# Patient Record
Sex: FEMALE | Race: WHITE | Employment: OTHER | URBAN - METROPOLITAN AREA
[De-identification: names, ages, dates, MRNs, and addresses within clinical notes are randomized per-mention and may not be internally consistent; named-entity substitution may affect disease eponyms.]

---

## 2017-01-26 RX ORDER — GABAPENTIN 300 MG/1
CAPSULE ORAL
Qty: 240 CAP | Refills: 3 | Status: ON HOLD | OUTPATIENT
Start: 2017-01-26 | End: 2018-03-03

## 2018-03-02 ENCOUNTER — APPOINTMENT (OUTPATIENT)
Dept: GENERAL RADIOLOGY | Age: 63
DRG: 218 | End: 2018-03-02
Attending: ORTHOPAEDIC SURGERY
Payer: COMMERCIAL

## 2018-03-02 ENCOUNTER — ANESTHESIA EVENT (OUTPATIENT)
Dept: SURGERY | Age: 63
DRG: 218 | End: 2018-03-02
Payer: COMMERCIAL

## 2018-03-02 ENCOUNTER — APPOINTMENT (OUTPATIENT)
Dept: GENERAL RADIOLOGY | Age: 63
DRG: 218 | End: 2018-03-02
Attending: EMERGENCY MEDICINE
Payer: COMMERCIAL

## 2018-03-02 ENCOUNTER — ANESTHESIA (OUTPATIENT)
Dept: SURGERY | Age: 63
DRG: 218 | End: 2018-03-02
Payer: COMMERCIAL

## 2018-03-02 ENCOUNTER — HOSPITAL ENCOUNTER (INPATIENT)
Age: 63
LOS: 12 days | Discharge: HOME | DRG: 218 | End: 2018-03-14
Attending: EMERGENCY MEDICINE | Admitting: FAMILY MEDICINE
Payer: COMMERCIAL

## 2018-03-02 DIAGNOSIS — S82.891C: Primary | ICD-10-CM

## 2018-03-02 PROBLEM — S82.891A ANKLE FRACTURE, RIGHT: Status: ACTIVE | Noted: 2018-03-02

## 2018-03-02 LAB
ABO + RH BLD: NORMAL
ALBUMIN SERPL-MCNC: 4.1 G/DL (ref 3.5–5)
ALBUMIN/GLOB SERPL: 1.1 {RATIO} (ref 1.1–2.2)
ALP SERPL-CCNC: 81 U/L (ref 45–117)
ALT SERPL-CCNC: 27 U/L (ref 12–78)
ANION GAP SERPL CALC-SCNC: 15 MMOL/L (ref 5–15)
APPEARANCE UR: ABNORMAL
APTT PPP: 23.6 SEC (ref 22.1–32)
AST SERPL-CCNC: 35 U/L (ref 15–37)
BACTERIA URNS QL MICRO: NEGATIVE /HPF
BASOPHILS # BLD: 0.1 K/UL (ref 0–0.1)
BASOPHILS NFR BLD: 1 % (ref 0–1)
BILIRUB SERPL-MCNC: 0.5 MG/DL (ref 0.2–1)
BILIRUB UR QL: NEGATIVE
BLOOD GROUP ANTIBODIES SERPL: NORMAL
BUN SERPL-MCNC: 42 MG/DL (ref 6–20)
BUN/CREAT SERPL: 13 (ref 12–20)
CALCIUM SERPL-MCNC: 9.1 MG/DL (ref 8.5–10.1)
CHLORIDE SERPL-SCNC: 93 MMOL/L (ref 97–108)
CO2 SERPL-SCNC: 26 MMOL/L (ref 21–32)
COLOR UR: ABNORMAL
CREAT SERPL-MCNC: 3.16 MG/DL (ref 0.55–1.02)
DIFFERENTIAL METHOD BLD: NORMAL
EOSINOPHIL # BLD: 0.1 K/UL (ref 0–0.4)
EOSINOPHIL NFR BLD: 1 % (ref 0–7)
EPITH CASTS URNS QL MICRO: ABNORMAL /LPF
ERYTHROCYTE [DISTWIDTH] IN BLOOD BY AUTOMATED COUNT: 13.5 % (ref 11.5–14.5)
GLOBULIN SER CALC-MCNC: 3.9 G/DL (ref 2–4)
GLUCOSE BLD STRIP.AUTO-MCNC: 130 MG/DL (ref 65–100)
GLUCOSE SERPL-MCNC: 119 MG/DL (ref 65–100)
GLUCOSE UR STRIP.AUTO-MCNC: NEGATIVE MG/DL
HCT VFR BLD AUTO: 36.4 % (ref 35–47)
HGB BLD-MCNC: 12.5 G/DL (ref 11.5–16)
HGB UR QL STRIP: NEGATIVE
IMM GRANULOCYTES # BLD: 0 K/UL (ref 0–0.04)
IMM GRANULOCYTES NFR BLD AUTO: 0 % (ref 0–0.5)
INR PPP: 1.1 (ref 0.9–1.1)
KETONES UR QL STRIP.AUTO: NEGATIVE MG/DL
LEUKOCYTE ESTERASE UR QL STRIP.AUTO: ABNORMAL
LYMPHOCYTES # BLD: 2.7 K/UL (ref 0.8–3.5)
LYMPHOCYTES NFR BLD: 25 % (ref 12–49)
MCH RBC QN AUTO: 31.7 PG (ref 26–34)
MCHC RBC AUTO-ENTMCNC: 34.3 G/DL (ref 30–36.5)
MCV RBC AUTO: 92.4 FL (ref 80–99)
MONOCYTES # BLD: 0.9 K/UL (ref 0–1)
MONOCYTES NFR BLD: 9 % (ref 5–13)
NEUTS SEG # BLD: 6.9 K/UL (ref 1.8–8)
NEUTS SEG NFR BLD: 65 % (ref 32–75)
NITRITE UR QL STRIP.AUTO: NEGATIVE
NRBC # BLD: 0 K/UL (ref 0–0.01)
NRBC BLD-RTO: 0 PER 100 WBC
PH UR STRIP: 6 [PH] (ref 5–8)
PLATELET # BLD AUTO: 155 K/UL (ref 150–400)
PMV BLD AUTO: 10.4 FL (ref 8.9–12.9)
POTASSIUM SERPL-SCNC: 3.1 MMOL/L (ref 3.5–5.1)
PROT SERPL-MCNC: 8 G/DL (ref 6.4–8.2)
PROT UR STRIP-MCNC: 30 MG/DL
PROTHROMBIN TIME: 10.9 SEC (ref 9–11.1)
RBC # BLD AUTO: 3.94 M/UL (ref 3.8–5.2)
RBC #/AREA URNS HPF: ABNORMAL /HPF (ref 0–5)
SERVICE CMNT-IMP: ABNORMAL
SODIUM SERPL-SCNC: 134 MMOL/L (ref 136–145)
SP GR UR REFRACTOMETRY: 1.02 (ref 1–1.03)
SPECIMEN EXP DATE BLD: NORMAL
THERAPEUTIC RANGE,PTTT: NORMAL SECS (ref 58–77)
UROBILINOGEN UR QL STRIP.AUTO: 0.2 EU/DL (ref 0.2–1)
WBC # BLD AUTO: 10.7 K/UL (ref 3.6–11)
WBC URNS QL MICRO: ABNORMAL /HPF (ref 0–4)

## 2018-03-02 PROCEDURE — 74011250636 HC RX REV CODE- 250/636

## 2018-03-02 PROCEDURE — 76060000033 HC ANESTHESIA 1 TO 1.5 HR: Performed by: ORTHOPAEDIC SURGERY

## 2018-03-02 PROCEDURE — 96365 THER/PROPH/DIAG IV INF INIT: CPT

## 2018-03-02 PROCEDURE — 77030031139 HC SUT VCRL2 J&J -A: Performed by: ORTHOPAEDIC SURGERY

## 2018-03-02 PROCEDURE — 77030034777 HC ROD EXT FIX CONN ANKL STRY -E: Performed by: ORTHOPAEDIC SURGERY

## 2018-03-02 PROCEDURE — 74011250637 HC RX REV CODE- 250/637: Performed by: ANESTHESIOLOGY

## 2018-03-02 PROCEDURE — 77030003747: Performed by: ORTHOPAEDIC SURGERY

## 2018-03-02 PROCEDURE — 0JDQ0ZZ EXTRACTION OF RIGHT FOOT SUBCUTANEOUS TISSUE AND FASCIA, OPEN APPROACH: ICD-10-PCS | Performed by: ORTHOPAEDIC SURGERY

## 2018-03-02 PROCEDURE — 36415 COLL VENOUS BLD VENIPUNCTURE: CPT | Performed by: EMERGENCY MEDICINE

## 2018-03-02 PROCEDURE — 77030034775 HC CLMP PIN EXT FIX ANG POST STRY -G: Performed by: ORTHOPAEDIC SURGERY

## 2018-03-02 PROCEDURE — 77030002916 HC SUT ETHLN J&J -A: Performed by: ORTHOPAEDIC SURGERY

## 2018-03-02 PROCEDURE — 73590 X-RAY EXAM OF LOWER LEG: CPT

## 2018-03-02 PROCEDURE — 74011000250 HC RX REV CODE- 250: Performed by: ORTHOPAEDIC SURGERY

## 2018-03-02 PROCEDURE — 77030019908 HC STETH ESOPH SIMS -A: Performed by: ANESTHESIOLOGY

## 2018-03-02 PROCEDURE — 77030028224 HC PDNG CST BSNM -A: Performed by: ORTHOPAEDIC SURGERY

## 2018-03-02 PROCEDURE — 82962 GLUCOSE BLOOD TEST: CPT

## 2018-03-02 PROCEDURE — 76010000161 HC OR TIME 1 TO 1.5 HR INTENSV-TIER 1: Performed by: ORTHOPAEDIC SURGERY

## 2018-03-02 PROCEDURE — 86900 BLOOD TYPING SEROLOGIC ABO: CPT | Performed by: EMERGENCY MEDICINE

## 2018-03-02 PROCEDURE — 77030014486 HC PIN EXT FIX STRY -C: Performed by: ORTHOPAEDIC SURGERY

## 2018-03-02 PROCEDURE — 85025 COMPLETE CBC W/AUTO DIFF WBC: CPT | Performed by: EMERGENCY MEDICINE

## 2018-03-02 PROCEDURE — 74011000272 HC RX REV CODE- 272: Performed by: ORTHOPAEDIC SURGERY

## 2018-03-02 PROCEDURE — 96375 TX/PRO/DX INJ NEW DRUG ADDON: CPT

## 2018-03-02 PROCEDURE — 99284 EMERGENCY DEPT VISIT MOD MDM: CPT

## 2018-03-02 PROCEDURE — 74011250636 HC RX REV CODE- 250/636: Performed by: ANESTHESIOLOGY

## 2018-03-02 PROCEDURE — 93005 ELECTROCARDIOGRAM TRACING: CPT

## 2018-03-02 PROCEDURE — 76210000006 HC OR PH I REC 0.5 TO 1 HR: Performed by: ORTHOPAEDIC SURGERY

## 2018-03-02 PROCEDURE — 0QSG35Z REPOSITION RIGHT TIBIA WITH EXTERNAL FIXATION DEVICE, PERCUTANEOUS APPROACH: ICD-10-PCS | Performed by: ORTHOPAEDIC SURGERY

## 2018-03-02 PROCEDURE — 74011250636 HC RX REV CODE- 250/636: Performed by: EMERGENCY MEDICINE

## 2018-03-02 PROCEDURE — 85730 THROMBOPLASTIN TIME PARTIAL: CPT | Performed by: EMERGENCY MEDICINE

## 2018-03-02 PROCEDURE — 77030026438 HC STYL ET INTUB CARD -A: Performed by: ANESTHESIOLOGY

## 2018-03-02 PROCEDURE — 77030035456: Performed by: ORTHOPAEDIC SURGERY

## 2018-03-02 PROCEDURE — 81001 URINALYSIS AUTO W/SCOPE: CPT | Performed by: EMERGENCY MEDICINE

## 2018-03-02 PROCEDURE — 77030035497 HC PIN EXT FIX TRNFX APEX STRY -C: Performed by: ORTHOPAEDIC SURGERY

## 2018-03-02 PROCEDURE — 77030034776 HC ROD EXT FIX SEMI CIRC MRI STRY -G: Performed by: ORTHOPAEDIC SURGERY

## 2018-03-02 PROCEDURE — 77030000032 HC CUF TRNQT ZIMM -B: Performed by: ORTHOPAEDIC SURGERY

## 2018-03-02 PROCEDURE — 77030008684 HC TU ET CUF COVD -B: Performed by: ANESTHESIOLOGY

## 2018-03-02 PROCEDURE — 73610 X-RAY EXAM OF ANKLE: CPT

## 2018-03-02 PROCEDURE — 0QSJ35Z REPOSITION RIGHT FIBULA WITH EXTERNAL FIXATION DEVICE, PERCUTANEOUS APPROACH: ICD-10-PCS | Performed by: ORTHOPAEDIC SURGERY

## 2018-03-02 PROCEDURE — 80053 COMPREHEN METABOLIC PANEL: CPT | Performed by: EMERGENCY MEDICINE

## 2018-03-02 PROCEDURE — 77030020833 HC CAP PROTCT PIN ASPN -A: Performed by: ORTHOPAEDIC SURGERY

## 2018-03-02 PROCEDURE — 74011000258 HC RX REV CODE- 258: Performed by: EMERGENCY MEDICINE

## 2018-03-02 PROCEDURE — 74011000250 HC RX REV CODE- 250

## 2018-03-02 PROCEDURE — 71045 X-RAY EXAM CHEST 1 VIEW: CPT

## 2018-03-02 PROCEDURE — 85610 PROTHROMBIN TIME: CPT | Performed by: EMERGENCY MEDICINE

## 2018-03-02 PROCEDURE — 77030011640 HC PAD GRND REM COVD -A: Performed by: ORTHOPAEDIC SURGERY

## 2018-03-02 PROCEDURE — 65270000029 HC RM PRIVATE

## 2018-03-02 PROCEDURE — 87086 URINE CULTURE/COLONY COUNT: CPT | Performed by: EMERGENCY MEDICINE

## 2018-03-02 PROCEDURE — 76001 XR FLUOROSCOPY OVER 60 MINUTES: CPT

## 2018-03-02 DEVICE — TRANSFIXING PIN
Type: IMPLANTABLE DEVICE | Site: ANKLE | Status: FUNCTIONAL
Brand: APEX

## 2018-03-02 DEVICE — SEMI-CIRCULAR CURVED ROD
Type: IMPLANTABLE DEVICE | Site: ANKLE | Status: FUNCTIONAL
Brand: HOFFMANN

## 2018-03-02 DEVICE — CONNECTING ROD
Type: IMPLANTABLE DEVICE | Site: ANKLE | Status: FUNCTIONAL
Brand: HOFFMANN

## 2018-03-02 RX ORDER — LETROZOLE 2.5 MG/1
2.5 TABLET, FILM COATED ORAL DAILY
COMMUNITY

## 2018-03-02 RX ORDER — MORPHINE SULFATE 4 MG/ML
4 INJECTION INTRAVENOUS ONCE
Status: COMPLETED | OUTPATIENT
Start: 2018-03-02 | End: 2018-03-02

## 2018-03-02 RX ORDER — SODIUM CHLORIDE 0.9 % (FLUSH) 0.9 %
5-10 SYRINGE (ML) INJECTION AS NEEDED
Status: DISCONTINUED | OUTPATIENT
Start: 2018-03-02 | End: 2018-03-02 | Stop reason: HOSPADM

## 2018-03-02 RX ORDER — SODIUM CHLORIDE 0.9 % (FLUSH) 0.9 %
5-10 SYRINGE (ML) INJECTION EVERY 8 HOURS
Status: DISCONTINUED | OUTPATIENT
Start: 2018-03-02 | End: 2018-03-02

## 2018-03-02 RX ORDER — HYDROCHLOROTHIAZIDE 25 MG/1
25 TABLET ORAL DAILY
Status: DISCONTINUED | OUTPATIENT
Start: 2018-03-03 | End: 2018-03-03

## 2018-03-02 RX ORDER — PROPOFOL 10 MG/ML
INJECTION, EMULSION INTRAVENOUS AS NEEDED
Status: DISCONTINUED | OUTPATIENT
Start: 2018-03-02 | End: 2018-03-02 | Stop reason: HOSPADM

## 2018-03-02 RX ORDER — FOLIC ACID 1 MG/1
1 TABLET ORAL DAILY
Status: DISCONTINUED | OUTPATIENT
Start: 2018-03-03 | End: 2018-03-14 | Stop reason: HOSPADM

## 2018-03-02 RX ORDER — LEVOTHYROXINE SODIUM 50 UG/1
50 TABLET ORAL
Status: DISCONTINUED | OUTPATIENT
Start: 2018-03-03 | End: 2018-03-14 | Stop reason: HOSPADM

## 2018-03-02 RX ORDER — CARVEDILOL 12.5 MG/1
12.5 TABLET ORAL 2 TIMES DAILY WITH MEALS
Status: DISCONTINUED | OUTPATIENT
Start: 2018-03-03 | End: 2018-03-14 | Stop reason: HOSPADM

## 2018-03-02 RX ORDER — OXYCODONE AND ACETAMINOPHEN 7.5; 325 MG/1; MG/1
1 TABLET ORAL
Status: DISCONTINUED | OUTPATIENT
Start: 2018-03-02 | End: 2018-03-12

## 2018-03-02 RX ORDER — HYDROMORPHONE HYDROCHLORIDE 1 MG/ML
.25-1 INJECTION, SOLUTION INTRAMUSCULAR; INTRAVENOUS; SUBCUTANEOUS
Status: DISCONTINUED | OUTPATIENT
Start: 2018-03-02 | End: 2018-03-02 | Stop reason: HOSPADM

## 2018-03-02 RX ORDER — LOSARTAN POTASSIUM 50 MG/1
50 TABLET ORAL DAILY
Status: DISCONTINUED | OUTPATIENT
Start: 2018-03-03 | End: 2018-03-03

## 2018-03-02 RX ORDER — LIDOCAINE HYDROCHLORIDE 20 MG/ML
INJECTION, SOLUTION EPIDURAL; INFILTRATION; INTRACAUDAL; PERINEURAL AS NEEDED
Status: DISCONTINUED | OUTPATIENT
Start: 2018-03-02 | End: 2018-03-02 | Stop reason: HOSPADM

## 2018-03-02 RX ORDER — PHENYLEPHRINE HCL IN 0.9% NACL 0.4MG/10ML
SYRINGE (ML) INTRAVENOUS
Status: DISCONTINUED | OUTPATIENT
Start: 2018-03-02 | End: 2018-03-02 | Stop reason: HOSPADM

## 2018-03-02 RX ORDER — PROCHLORPERAZINE EDISYLATE 5 MG/ML
5 INJECTION INTRAMUSCULAR; INTRAVENOUS
Status: DISCONTINUED | OUTPATIENT
Start: 2018-03-02 | End: 2018-03-14 | Stop reason: HOSPADM

## 2018-03-02 RX ORDER — CARVEDILOL 12.5 MG/1
12.5 TABLET ORAL 2 TIMES DAILY WITH MEALS
COMMUNITY

## 2018-03-02 RX ORDER — SODIUM CHLORIDE 0.9 % (FLUSH) 0.9 %
5-10 SYRINGE (ML) INJECTION EVERY 8 HOURS
Status: DISCONTINUED | OUTPATIENT
Start: 2018-03-02 | End: 2018-03-02 | Stop reason: HOSPADM

## 2018-03-02 RX ORDER — SODIUM CHLORIDE, SODIUM LACTATE, POTASSIUM CHLORIDE, CALCIUM CHLORIDE 600; 310; 30; 20 MG/100ML; MG/100ML; MG/100ML; MG/100ML
100 INJECTION, SOLUTION INTRAVENOUS CONTINUOUS
Status: DISCONTINUED | OUTPATIENT
Start: 2018-03-02 | End: 2018-03-02 | Stop reason: HOSPADM

## 2018-03-02 RX ORDER — AMLODIPINE BESYLATE 10 MG/1
10 TABLET ORAL DAILY
Status: ON HOLD | COMMUNITY
End: 2018-03-02

## 2018-03-02 RX ORDER — SODIUM CHLORIDE 0.9 % (FLUSH) 0.9 %
5-10 SYRINGE (ML) INJECTION EVERY 8 HOURS
Status: DISCONTINUED | OUTPATIENT
Start: 2018-03-03 | End: 2018-03-14 | Stop reason: HOSPADM

## 2018-03-02 RX ORDER — HEPARIN SODIUM 5000 [USP'U]/ML
5000 INJECTION, SOLUTION INTRAVENOUS; SUBCUTANEOUS EVERY 8 HOURS
Status: DISPENSED | OUTPATIENT
Start: 2018-03-03 | End: 2018-03-11

## 2018-03-02 RX ORDER — ACETAMINOPHEN 325 MG/1
650 TABLET ORAL
Status: DISCONTINUED | OUTPATIENT
Start: 2018-03-02 | End: 2018-03-14 | Stop reason: HOSPADM

## 2018-03-02 RX ORDER — SODIUM CHLORIDE 0.9 % (FLUSH) 0.9 %
5-10 SYRINGE (ML) INJECTION AS NEEDED
Status: DISCONTINUED | OUTPATIENT
Start: 2018-03-02 | End: 2018-03-02

## 2018-03-02 RX ORDER — SODIUM CHLORIDE 0.9 % (FLUSH) 0.9 %
5-10 SYRINGE (ML) INJECTION AS NEEDED
Status: DISCONTINUED | OUTPATIENT
Start: 2018-03-02 | End: 2018-03-14 | Stop reason: HOSPADM

## 2018-03-02 RX ORDER — SCOLOPAMINE TRANSDERMAL SYSTEM 1 MG/1
1 PATCH, EXTENDED RELEASE TRANSDERMAL
Status: COMPLETED | OUTPATIENT
Start: 2018-03-02 | End: 2018-03-05

## 2018-03-02 RX ORDER — SUCCINYLCHOLINE CHLORIDE 20 MG/ML
INJECTION INTRAMUSCULAR; INTRAVENOUS AS NEEDED
Status: DISCONTINUED | OUTPATIENT
Start: 2018-03-02 | End: 2018-03-02 | Stop reason: HOSPADM

## 2018-03-02 RX ORDER — FENTANYL CITRATE 50 UG/ML
INJECTION, SOLUTION INTRAMUSCULAR; INTRAVENOUS AS NEEDED
Status: DISCONTINUED | OUTPATIENT
Start: 2018-03-02 | End: 2018-03-02 | Stop reason: HOSPADM

## 2018-03-02 RX ORDER — CEFAZOLIN SODIUM/WATER 2 G/20 ML
2 SYRINGE (ML) INTRAVENOUS EVERY 8 HOURS
Status: COMPLETED | OUTPATIENT
Start: 2018-03-03 | End: 2018-03-05

## 2018-03-02 RX ORDER — FUROSEMIDE 20 MG/1
10 TABLET ORAL DAILY
Status: ON HOLD | COMMUNITY
End: 2018-03-02

## 2018-03-02 RX ORDER — LORAZEPAM 2 MG/ML
1 INJECTION INTRAMUSCULAR
Status: DISCONTINUED | OUTPATIENT
Start: 2018-03-02 | End: 2018-03-14 | Stop reason: HOSPADM

## 2018-03-02 RX ORDER — ONDANSETRON 2 MG/ML
INJECTION INTRAMUSCULAR; INTRAVENOUS AS NEEDED
Status: DISCONTINUED | OUTPATIENT
Start: 2018-03-02 | End: 2018-03-02 | Stop reason: HOSPADM

## 2018-03-02 RX ORDER — NALOXONE HYDROCHLORIDE 0.4 MG/ML
0.4 INJECTION, SOLUTION INTRAMUSCULAR; INTRAVENOUS; SUBCUTANEOUS AS NEEDED
Status: DISCONTINUED | OUTPATIENT
Start: 2018-03-02 | End: 2018-03-14 | Stop reason: HOSPADM

## 2018-03-02 RX ORDER — HYDROMORPHONE HYDROCHLORIDE 1 MG/ML
1 INJECTION, SOLUTION INTRAMUSCULAR; INTRAVENOUS; SUBCUTANEOUS
Status: DISCONTINUED | OUTPATIENT
Start: 2018-03-02 | End: 2018-03-12

## 2018-03-02 RX ORDER — ASPIRIN 325 MG/1
100 TABLET, FILM COATED ORAL DAILY
Status: DISCONTINUED | OUTPATIENT
Start: 2018-03-03 | End: 2018-03-14 | Stop reason: HOSPADM

## 2018-03-02 RX ORDER — LANOLIN ALCOHOL/MO/W.PET/CERES
100 CREAM (GRAM) TOPICAL DAILY
Status: ON HOLD | COMMUNITY
End: 2018-03-02

## 2018-03-02 RX ORDER — CEFAZOLIN SODIUM 1 G/3ML
INJECTION, POWDER, FOR SOLUTION INTRAMUSCULAR; INTRAVENOUS AS NEEDED
Status: DISCONTINUED | OUTPATIENT
Start: 2018-03-02 | End: 2018-03-02 | Stop reason: HOSPADM

## 2018-03-02 RX ORDER — ROCURONIUM BROMIDE 10 MG/ML
INJECTION, SOLUTION INTRAVENOUS AS NEEDED
Status: DISCONTINUED | OUTPATIENT
Start: 2018-03-02 | End: 2018-03-02 | Stop reason: HOSPADM

## 2018-03-02 RX ORDER — SIMVASTATIN 20 MG/1
20 TABLET, FILM COATED ORAL DAILY
Status: DISCONTINUED | OUTPATIENT
Start: 2018-03-03 | End: 2018-03-14 | Stop reason: HOSPADM

## 2018-03-02 RX ORDER — EPHEDRINE SULFATE 50 MG/ML
INJECTION, SOLUTION INTRAVENOUS AS NEEDED
Status: DISCONTINUED | OUTPATIENT
Start: 2018-03-02 | End: 2018-03-02 | Stop reason: HOSPADM

## 2018-03-02 RX ORDER — MIDAZOLAM HYDROCHLORIDE 1 MG/ML
INJECTION, SOLUTION INTRAMUSCULAR; INTRAVENOUS AS NEEDED
Status: DISCONTINUED | OUTPATIENT
Start: 2018-03-02 | End: 2018-03-02 | Stop reason: HOSPADM

## 2018-03-02 RX ORDER — LIDOCAINE HYDROCHLORIDE 10 MG/ML
0.1 INJECTION, SOLUTION EPIDURAL; INFILTRATION; INTRACAUDAL; PERINEURAL AS NEEDED
Status: DISCONTINUED | OUTPATIENT
Start: 2018-03-02 | End: 2018-03-02 | Stop reason: HOSPADM

## 2018-03-02 RX ORDER — HYDROCHLOROTHIAZIDE 25 MG/1
25 TABLET ORAL DAILY
COMMUNITY

## 2018-03-02 RX ORDER — DULOXETIN HYDROCHLORIDE 20 MG/1
20 CAPSULE, DELAYED RELEASE ORAL DAILY
Status: DISCONTINUED | OUTPATIENT
Start: 2018-03-03 | End: 2018-03-14 | Stop reason: HOSPADM

## 2018-03-02 RX ADMIN — SODIUM CHLORIDE, POTASSIUM CHLORIDE, SODIUM LACTATE AND CALCIUM CHLORIDE: 600; 310; 30; 20 INJECTION, SOLUTION INTRAVENOUS at 20:30

## 2018-03-02 RX ADMIN — SUCCINYLCHOLINE CHLORIDE 100 MG: 20 INJECTION INTRAMUSCULAR; INTRAVENOUS at 20:37

## 2018-03-02 RX ADMIN — ONDANSETRON 4 MG: 2 INJECTION INTRAMUSCULAR; INTRAVENOUS at 21:43

## 2018-03-02 RX ADMIN — Medication 40 MCG/MIN: at 21:03

## 2018-03-02 RX ADMIN — SODIUM CHLORIDE 1000 ML: 900 INJECTION, SOLUTION INTRAVENOUS at 19:02

## 2018-03-02 RX ADMIN — PIPERACILLIN SODIUM AND TAZOBACTAM SODIUM 3.38 G: 3; .375 INJECTION, POWDER, LYOPHILIZED, FOR SOLUTION INTRAVENOUS at 19:04

## 2018-03-02 RX ADMIN — LIDOCAINE HYDROCHLORIDE 80 MG: 20 INJECTION, SOLUTION EPIDURAL; INFILTRATION; INTRACAUDAL; PERINEURAL at 20:37

## 2018-03-02 RX ADMIN — CEFAZOLIN SODIUM 2 G: 1 INJECTION, POWDER, FOR SOLUTION INTRAMUSCULAR; INTRAVENOUS at 20:57

## 2018-03-02 RX ADMIN — EPHEDRINE SULFATE 10 MG: 50 INJECTION, SOLUTION INTRAVENOUS at 21:04

## 2018-03-02 RX ADMIN — FENTANYL CITRATE 150 MCG: 50 INJECTION, SOLUTION INTRAMUSCULAR; INTRAVENOUS at 20:37

## 2018-03-02 RX ADMIN — SODIUM CHLORIDE, POTASSIUM CHLORIDE, SODIUM LACTATE AND CALCIUM CHLORIDE: 600; 310; 30; 20 INJECTION, SOLUTION INTRAVENOUS at 21:20

## 2018-03-02 RX ADMIN — MORPHINE SULFATE 4 MG: 4 INJECTION INTRAVENOUS at 19:04

## 2018-03-02 RX ADMIN — MIDAZOLAM HYDROCHLORIDE 2 MG: 1 INJECTION, SOLUTION INTRAMUSCULAR; INTRAVENOUS at 20:29

## 2018-03-02 RX ADMIN — ROCURONIUM BROMIDE 5 MG: 10 INJECTION, SOLUTION INTRAVENOUS at 20:37

## 2018-03-02 RX ADMIN — PROPOFOL 170 MG: 10 INJECTION, EMULSION INTRAVENOUS at 20:37

## 2018-03-02 NOTE — ED PROVIDER NOTES
HPI Comments: 61 y.o. female with past medical history significant for HTN, vertigo, and memory loss, who presents via EMS with chief complaint of right ankle laceration. Pt reports she fell while walking in the dark in her house 3 days ago and injured her right ankle. She reports that since that time, she has had an open wound on the medial side of her right ankle that has been persistently bleeding. She reports difficulty weight bearing. She states the ankle is mildy painful. She denies any other injuries and makes no other complaints at this time. There are no other acute medical concerns at this time. PCP: Rene Delatorre MD    Note written by Warren Rasmussen, as dictated by Renee Millan MD 6:01 PM    The history is provided by the patient. No  was used. Past Medical History:   Diagnosis Date    Alcohol abuse     Anxiety     Depression     Fatigue     Hypertension     Memory loss     Vertigo     Visual disturbance        Past Surgical History:   Procedure Laterality Date    HX APPENDECTOMY      HX ORTHOPAEDIC      right knee repair    HX OTHER SURGICAL  04/2016    double mastectomy     HX OTHER SURGICAL  06/2016    reconstructive surgery on her breasts         Family History:   Problem Relation Age of Onset    Cancer Mother     Other Father        Social History     Social History    Marital status:      Spouse name: N/A    Number of children: N/A    Years of education: N/A     Occupational History    Not on file. Social History Main Topics    Smoking status: Never Smoker    Smokeless tobacco: Not on file    Alcohol use No      Comment: last drink was this am.  prior to that, last drink was friday.  Drug use: Not on file    Sexual activity: Not on file     Other Topics Concern    Not on file     Social History Narrative         ALLERGIES: Review of patient's allergies indicates no known allergies.     Review of Systems Constitutional: Negative. Negative for appetite change, fever and unexpected weight change. HENT: Negative. Negative for ear pain, hearing loss, nosebleeds, rhinorrhea, sore throat and trouble swallowing. Respiratory: Negative. Negative for cough, chest tightness and shortness of breath. Cardiovascular: Negative. Negative for chest pain and palpitations. Gastrointestinal: Negative. Negative for abdominal distention, abdominal pain, blood in stool and vomiting. Endocrine: Negative. Genitourinary: Negative for dysuria and hematuria. Musculoskeletal: Negative. Negative for back pain and myalgias. Skin: Positive for wound. Negative for rash. Allergic/Immunologic: Negative. Neurological: Negative. Negative for dizziness, syncope, weakness and numbness. Hematological: Negative. Psychiatric/Behavioral: Negative. All other systems reviewed and are negative. There were no vitals filed for this visit. Physical Exam   Constitutional: She is oriented to person, place, and time. She appears well-developed and well-nourished. No distress. HENT:   Head: Normocephalic and atraumatic. Right Ear: External ear normal.   Left Ear: External ear normal.   Nose: Nose normal.   Mouth/Throat: Oropharynx is clear and moist.   Eyes: Conjunctivae and EOM are normal. Pupils are equal, round, and reactive to light. Neck: Normal range of motion. Neck supple. No JVD present. No thyromegaly present. Cardiovascular: Normal rate, regular rhythm, normal heart sounds and intact distal pulses. No murmur heard. Pulmonary/Chest: Effort normal and breath sounds normal. No respiratory distress. She has no wheezes. She has no rales. Abdominal: Soft. Bowel sounds are normal. She exhibits no distension. There is no tenderness. Musculoskeletal: Normal range of motion. She exhibits no edema.    Open deformity of the medial aspect of her ankle with bone protrusion; NVID; minimal bleeding at this time; no foreign body or signs of infection. Neurological: She is alert and oriented to person, place, and time. No cranial nerve deficit. Skin: Skin is warm and dry. No rash noted. Psychiatric: She has a normal mood and affect. Her behavior is normal. Thought content normal.      Note written by Karren Brunner, as dictated by Mehran Tomlin MD 6:01 PM      MDM  Number of Diagnoses or Management Options        ED Course       Procedures    CONSULT NOTE:  6:28 PM Vinicius Ruiz MD spoke with Deidra Lopez NP, working in collaboration with Rishi Rangel MD, Consult for Orthopedics. Discussed available diagnostic tests and clinical findings. Will see pt.    7:35 PM  Pt will go to OR with ortho for washout and fixation.

## 2018-03-02 NOTE — IP AVS SNAPSHOT
[address] [facility] 
 
 
 [address] [facility] 
[phone] Patient: Ward Maxwell MRN: [id number] CAJ:[id number] About your hospitalization You were admitted on:  March 2, 2018 You last received care in the:  [facility] 4M POST SURG ORT 2 You were discharged on:  March 14, 2018 Why you were hospitalized Your primary diagnosis was: Ankle Fracture, Right Your diagnoses also included:  Gerry (Acute Kidney Injury) (Hcc), Hypokalemia Due To Loss Of Potassium, Htn (Hypertension), Benign, Depression With Anxiety, Dyslipidemia Follow-up Information Follow up With Details Comments Contact Info Dhruv Damon, [address] 
[phone] [address] [place] [address] [address] 
1st Floor [facility] [address] 
[phone] [facility] [address] [facility] [address] 
[phone] Discharge Orders None A check will indicates which time of day the medication should be taken. My Medications START taking these medications Instructions Each Dose to Equal  
 Morning Noon Evening Bedtime  
 amLODIPine 10 mg tablet Commonly known as:  Kalia Lords Your last dose was:  3/14/18 8:07 a.m. Your next dose is:  3/15/18 Morning. Take 1 Tab by mouth daily. 10 mg  
    
   
   
   
  
 folic acid 1 mg tablet Commonly known as:  [employer] Your last dose was:  3/14/18 8:08 a.m. Your next dose is:  3/15/18 Morning. Take 1 Tab by mouth daily. 1 mg  
    
   
   
   
  
 oxyCODONE-acetaminophen 5-325 mg per tablet Commonly known as:  PERCOCET Your last dose was:  3/14/18 9:00 a.m. Your next dose is:  Can take again at 1:00p.m. If needed Take 1 Tab by mouth every four (4) hours as needed. Max Daily Amount: 6 Tabs. 1 Tab  
    
   
   
   
  
 polyethylene glycol 17 gram packet Commonly known as:  Maverick Puenteo Your last dose was:  3/14/18 8:08 a.m. Your next dose is:  3/15/18 Morning. Take 1 Packet by mouth daily. 17 g  
    
   
   
   
  
 rivaroxaban 10 mg tablet Commonly known as:  Radha Casey Your last dose was: This medication has not been taken in the hospital.   
Your next dose is:  Restart per home schedule. Take 1 Tab by mouth daily (with dinner). 10 mg CONTINUE taking these medications Instructions Each Dose to Equal  
 Morning Noon Evening Bedtime  
 carvedilol 12.5 mg tablet Commonly known as:  Clerance Barley Your last dose was:  3/14/18 8:08 a.m. Your next dose is:  3/14/18 Evening dose Take 12.5 mg by mouth two (2) times daily (with meals). 12.5 mg DULoxetine 20 mg capsule Commonly known as:  CYMBALTA Your last dose was:  3/14/18 8:07 a.m. Your next dose is:  3/15/18 Morning. Take 20 mg by mouth daily. 20 mg  
    
   
   
   
  
 gabapentin 300 mg capsule Commonly known as:  NEURONTIN Your last dose was:  3/14/18 8:07 a.m. Your next dose is:  3/14/18 Two doses remaining for today. Take one in the afternoon and one in the evening. Take 600 mg by mouth three (3) times daily. 600 mg  
    
   
   
   
  
 hydroCHLOROthiazide 25 mg tablet Commonly known as:  HYDRODIURIL Your last dose was:  3/14/18 8:08 a.m. Take 25 mg by mouth daily. 25 mg  
    
   
   
   
  
 letrozole 2.5 mg tablet Commonly known as:  [facility] Your last dose was:  3/14/18 8:08 a.m. Your next dose is:  3/15/18 Morning. Take 2.5 mg by mouth daily. 2.5 mg  
    
   
   
   
  
 levothyroxine 50 mcg tablet Commonly known as:  SYNTHROID Your last dose was:  3/14/18 4:51 a.m. Your next dose is:  3/15/18 Before breakfast 
  
   
 Take 50 mcg by mouth Daily (before breakfast). 50 mcg  
    
   
   
   
  
 losartan 50 mg tablet Commonly known as:  COZAAR Your last dose was:  3/14/18 8:07 a.m. Your next dose is:  3/15/18 Morning. Take 50 mg by mouth daily. 50 mg  
    
   
   
   
  
 multivitamin, tx-iron-ca-min 9 mg iron-400 mcg Tab tablet Commonly known as:  THERA-M w/ IRON Your last dose was: This medication has not been taken in the hospital.   
Your next dose is: Take per home schedule. Take 1 Tab by mouth daily. 1 Tab  
    
   
   
   
  
 simvastatin 20 mg tablet Commonly known as:  ZOCOR Your last dose was:  3/13/18 5:46 p.m. Your next dose is:  3/14/18 Evening dose. Take 20 mg by mouth nightly. 20 mg  
    
   
   
   
  
 traZODone 50 mg tablet Commonly known as:  Tyler Leavitt Your last dose was:  3/13/18 10:17 Your next dose is:  3/14/18 Night, if needed for sleep. Take  mg by mouth nightly as needed for Sleep.  
  mg  
    
   
   
   
  
  
STOP taking these medications   
 aspirin delayed-release 325 mg tablet Where to Get Your Medications These medications were sent to [facility]/pharmacy #[id number], [address] ([address] [place])  [address] [place] [address], [place] [address] Phone:  [phone]  
  amLODIPine 10 mg tablet  
 rivaroxaban 10 mg tablet Information on where to get these meds will be given to you by the nurse or doctor. ! Ask your nurse or doctor about these medications  
  folic acid 1 mg tablet  
 oxyCODONE-acetaminophen 5-325 mg per tablet  
 polyethylene glycol 17 gram packet Discharge Instructions Patient Discharge Instructions Donovan Sheets / [id number] : 1955 Admitted 3/2/2018 Discharged: 3/14/2018 7:26 AM  
 
ACUTE DIAGNOSES: 
RIGHT ANKLE FRACTURE Ankle fracture, right ANKLE FRACTURE 
 
CHRONIC MEDICAL DIAGNOSES: 
Problem List as of 3/14/2018  Date Reviewed: 2016 Codes Class Noted - Resolved MARYJO (acute kidney injury) ([facility] [address] ICD-10-CM: N17.9 ICD-9-CM: 584.9  3/3/2018 - Present Hypokalemia due to loss of potassium ICD-10-CM: E87.6 ICD-9-CM: 276.8  3/3/2018 - Present HTN (hypertension), benign ICD-10-CM: I10 
ICD-9-CM: 401.1  3/3/2018 - Present Depression with anxiety ICD-10-CM: F41.8 ICD-9-CM: 300.4  3/3/2018 - Present Dyslipidemia ICD-10-CM: E78.5 ICD-9-CM: 272.4  3/3/2018 - Present * (Principal)Ankle fracture, right ICD-10-CM: N28.651Y ICD-9-CM: 824.8  3/2/2018 - Present Numbness and tingling of both legs below knees ICD-10-CM: R20.0, R20.2 ICD-9-CM: 782.0  11/30/2015 - Present Numbness in feet ICD-10-CM: R20.0 ICD-9-CM: 782.0  10/23/2015 - Present Burning sensation of feet ICD-10-CM: R20.8 ICD-9-CM: 782.0  10/23/2015 - Present SAH (subarachnoid hemorrhage) (HCC) ICD-10-CM: I60.9 ICD-9-CM: 430  10/23/2015 - Present DISCHARGE MEDICATIONS:  
 
 
 
· It is important that you take the medication exactly as they are prescribed. · Keep your medication in the bottles provided by the pharmacist and keep a list of the medication names, dosages, and times to be taken in your wallet. · Do not take other medications without consulting your doctor. DIET:  Regular Diet ACTIVITY: Activity as tolerated ADDITIONAL INFORMATION: If you experience any of the following symptoms then please call your primary care physician or return to the emergency room if you cannot get hold of your doctor: Fever, chills, nausea, vomiting, diarrhea, change in mentation, falling, bleeding, shortness of breath. FOLLOW UP CARE: 
Dr. Tal Booth MD  you are to call and set up an appointment to see them with in 1 week. Follow-up with specialists at directed by them Information obtained by : 
I understand that if any problems occur once I am at home I am to contact my physician. I understand and acknowledge receipt of the instructions indicated above. Physician's or R.N.'s Signature                                                                  Date/Time Patient or Representative Signature                                                          Date/Time Florentin Holcomb MD 
Foot and Ankle Surgery General Orthopaedics POST-OP Instructions BLOOD CLOTS: To prevent blood clot formation, you have been prescribed aspirin 81mg tablets to be taken by mouth once a day for 6 week. PAIN CONTROL: For pain control, you have been prescribed narcotic 
 
medication (Oxycodone or Norco). Take as prescribed and wean as able. VERY IMPORTANT  PLEASE READ. If you were given a nerve block by the anesthesia team to numb your leg, you must start taking pain medication BEFORE the block wears off EVEN IF YOU ARE NOT HAVING PAIN. If you do not start taking pain medication before the block wears off, you will be behind on pain control. The block will usually wear off in 12-24 hours after surgery, so you must have pain medication in your system before the block wears off. Take pain medication with food if possible to minimize stomach irritation. You may 
 
take tylenol (acetaminophen) with Oxycodone but not with medications that 
 
already have acetaminophen in them such as Norco. While taking narcotics, you 
 
may have constipation. A stool softener is recommended-- you may use an over- 
 
the-counter stool softener or use the one that has been prescribed for you. We 
 
recommend no ibuprofen (Advil, Motrin, etc) since there are some studies that show it may interfere with bone healing. Do not drink alcohol or drive while using 
 
narcotic pain medication. VERY IMPORTANT - PLEASE READ Please monitor the supply of your pain medicine closely and if it looks like you're going to run out of pain medicine over the weekend please call the office on [address] prior to the weekend to request a refill. The on call physician for nights and weekends CANNOT refill pain medicine. You will either have to wait until Monday morning when the office re-opens or you will have to go to an urgent care/ emergency room if you are in need of pain medicine. NAUSEA/VOMITING: Sometimes after surgery, patients have nausea or 
 
vomiting. A medicine has been prescribed for this. If you do not have nausea or 
 
vomiting, you do not need to have this prescription filled. HOME MEDICATIONS: Resume medications you were taking prior to surgery 
 
unless you have been counseled to discontinue them. POST-OPERATIVE INSTRUCTIONS: 
 
ACTIVITY: Please elevate your operative extremity above the level of your heart 
 
for the first 4 days as much as possible after surgery (a good way to remember 
 
this is (toes above nose). Moving around and walking (with crutches) helps 
 
decrease the risk of blood clots. While you are sleeping and when you are not 
 
being active, continue to keep your leg elevated as much as possible. It is 
 
common to have swelling in your feet after surgery for even a year afterwards, so 
 
the more you keep the leg elevated after surgery, the less swelling you will have 
 
later on. WEIGHTBEARING: You are to be NON- weight-bearing to your operative leg. GENERAL INSTRUCTIONS: 
 
1. Be alert for signs of infection including redness, streaking, odor, 
 
fever or chills. Be alert for excessive pain or bleeding and notify 
 
your surgeon immediately.  
 
2. Notify Provider of nausea, vomiting, or chills, numbness or weakness, 
 
 bleeding, redness, swelling, pain, or drainage from surgical incision(s), bowel or 
 
bladder dysfunction, severe pain not relieved by pain medications, temperature 
 
greater than 101.0 F (38.3 C) degrees 3. If you smoke (or have smoked within the last year), we strongly recommend 
 
that you do not smoke. DIET AND COMFORT: Return to your regular diet as tolerated. Begin with light 
 
or bland foods. Drink plenty of fluids. DRESSING CARE: 1. Leave your dressing/cast/splint in place until your post operative visit. Keep it 
 
clean and dry. Mild to moderate blood or drainage after surgery is expected. 2. Keep your operative extremity elevated. Avoid pressure under the heel by 
 
placing pillows under your calf, not your foot. 3. Use bone stimulator 3 hours per day. FOLLOWUP INSTRUCTIONS: 
 
1. Follow up in clinic with your surgeon within 2-3 weeks. If your appointment has 
 
not already been made at the time of discharge, you will need to call to make the 
 
appointment. 2. The general scheduling number is [phone] 3. Contact your physicians office during office hours. For medical emergencies 
 
call [phone]. [employer] Announcement We are excited to announce that we are making your provider's discharge notes available to you in Inn[employer]. You will see these notes when they are completed and signed by the physician that discharged you from your recent hospital stay. If you have any questions or concerns about any information you see in Inn[employer]ub, please call the Health Information Department where you were seen or reach out to your Primary Care Provider for more information about your plan of care. Introducing [facility]! Betito Davis introduces [employer] patient portal. Now you can access parts of your medical record, email your doctor's office, and request medication refills online.    
 
1. In your internet browser, go to [email or web address] 2. Click on the First Time User? Click Here link in the Sign In box. You will see the New Member Sign Up page. 3. Enter your [employer] Access Code exactly as it appears below. You will not need to use this code after youve completed the sign-up process. If you do not sign up before the expiration date, you must request a new code. · [employer] Access Code: [email or web address] Expires: 6/3/2018  8:03 PM 
 
4. Enter the last four digits of your Social Security Number (xxxx) and Date of Birth (mm/dd/yyyy) as indicated and click Submit. You will be taken to the next sign-up page. 5. Create a [employer] ID. This will be your [employer] login ID and cannot be changed, so think of one that is secure and easy to remember. 6. Create a [employer] password. You can change your password at any time. 7. Enter your Password Reset Question and Answer. This can be used at a later time if you forget your password. 8. Enter your e-mail address. You will receive e-mail notification when new information is available in [address] 9. Click Sign Up. You can now view and download portions of your medical record. 10. Click the Download Summary menu link to download a portable copy of your medical information. If you have questions, please visit the Frequently Asked Questions section of the [employer] website. Remember, [employer] is NOT to be used for urgent needs. For medical emergencies, dial 911. Now available from your iPhone and Android! Providers Seen During Your Hospitalization Provider Specialty Primary office phone Pau Huerta MD Emergency Medicine [phone] Florentin Bazan MD [facility] [phone] Your Primary Care Physician (PCP) Primary Care Physician Office Phone Office Fax Mary Beth Curiel [place] [phone] You are allergic to the following Allergen Reactions Sulfa (Sulfonamide Antibiotics) Swelling Other (comments) Flushing and hot feeling Facial swelling Recent Documentation Height Weight Breastfeeding? BMI OB Status Smoking Status 1.702 m 69.9 kg No 24.12 kg/m2 Postmenopausal Never Smoker Emergency Contacts Name Discharge Info Relation Home Work Mobile David Clement DISCHARGE CAREGIVER [3] Spouse [3] [phone] Patient Belongings The following personal items are in your possession at time of discharge: 
  Dental Appliances: None  Visual Aid: Glasses, At bedside      Home Medications: None   Jewelry: None  Clothing: None    Other Valuables: None Please provide this summary of care documentation to your next provider. Signatures-by signing, you are acknowledging that this After Visit Summary has been reviewed with you and you have received a copy. Patient Signature:  ____________________________________________________________ Date:  ____________________________________________________________  
  
[facility] [facility] Provider Signature:  ____________________________________________________________ Date:  ____________________________________________________________

## 2018-03-03 ENCOUNTER — APPOINTMENT (OUTPATIENT)
Dept: CT IMAGING | Age: 63
DRG: 218 | End: 2018-03-03
Attending: NURSE PRACTITIONER
Payer: COMMERCIAL

## 2018-03-03 PROBLEM — F41.8 DEPRESSION WITH ANXIETY: Status: ACTIVE | Noted: 2018-03-03

## 2018-03-03 PROBLEM — E78.5 DYSLIPIDEMIA: Status: ACTIVE | Noted: 2018-03-03

## 2018-03-03 PROBLEM — I10 HTN (HYPERTENSION), BENIGN: Status: ACTIVE | Noted: 2018-03-03

## 2018-03-03 PROBLEM — N17.9 AKI (ACUTE KIDNEY INJURY) (HCC): Status: ACTIVE | Noted: 2018-03-03

## 2018-03-03 PROBLEM — E87.6 HYPOKALEMIA DUE TO LOSS OF POTASSIUM: Status: ACTIVE | Noted: 2018-03-03

## 2018-03-03 LAB
BACTERIA SPEC CULT: NORMAL
BACTERIA SPEC CULT: NORMAL
SERVICE CMNT-IMP: NORMAL

## 2018-03-03 PROCEDURE — 73700 CT LOWER EXTREMITY W/O DYE: CPT

## 2018-03-03 PROCEDURE — 74011250637 HC RX REV CODE- 250/637: Performed by: ORTHOPAEDIC SURGERY

## 2018-03-03 PROCEDURE — 74011250636 HC RX REV CODE- 250/636: Performed by: INTERNAL MEDICINE

## 2018-03-03 PROCEDURE — 74011250636 HC RX REV CODE- 250/636: Performed by: ORTHOPAEDIC SURGERY

## 2018-03-03 PROCEDURE — 74011250637 HC RX REV CODE- 250/637: Performed by: FAMILY MEDICINE

## 2018-03-03 PROCEDURE — 97530 THERAPEUTIC ACTIVITIES: CPT

## 2018-03-03 PROCEDURE — 97162 PT EVAL MOD COMPLEX 30 MIN: CPT

## 2018-03-03 PROCEDURE — 65270000029 HC RM PRIVATE

## 2018-03-03 PROCEDURE — 74011250636 HC RX REV CODE- 250/636: Performed by: FAMILY MEDICINE

## 2018-03-03 RX ORDER — SIMVASTATIN 20 MG/1
20 TABLET, FILM COATED ORAL
COMMUNITY

## 2018-03-03 RX ORDER — LORAZEPAM 2 MG/ML
2 INJECTION INTRAMUSCULAR
Status: COMPLETED | OUTPATIENT
Start: 2018-03-03 | End: 2018-03-03

## 2018-03-03 RX ORDER — GABAPENTIN 300 MG/1
600 CAPSULE ORAL 3 TIMES DAILY
COMMUNITY

## 2018-03-03 RX ORDER — TRAZODONE HYDROCHLORIDE 50 MG/1
50-100 TABLET ORAL
COMMUNITY

## 2018-03-03 RX ORDER — PROCHLORPERAZINE MALEATE 5 MG
10 TABLET ORAL
Status: DISCONTINUED | OUTPATIENT
Start: 2018-03-03 | End: 2018-03-14 | Stop reason: HOSPADM

## 2018-03-03 RX ORDER — THERA TABS 400 MCG
1 TAB ORAL DAILY
Status: DISCONTINUED | OUTPATIENT
Start: 2018-03-03 | End: 2018-03-14 | Stop reason: HOSPADM

## 2018-03-03 RX ORDER — POTASSIUM CHLORIDE AND SODIUM CHLORIDE 900; 300 MG/100ML; MG/100ML
INJECTION, SOLUTION INTRAVENOUS CONTINUOUS
Status: DISCONTINUED | OUTPATIENT
Start: 2018-03-03 | End: 2018-03-05

## 2018-03-03 RX ORDER — ASPIRIN 325 MG
325 TABLET, DELAYED RELEASE (ENTERIC COATED) ORAL
COMMUNITY
End: 2018-03-14

## 2018-03-03 RX ORDER — DULOXETIN HYDROCHLORIDE 20 MG/1
20 CAPSULE, DELAYED RELEASE ORAL DAILY
COMMUNITY

## 2018-03-03 RX ADMIN — Medication 10 ML: at 22:31

## 2018-03-03 RX ADMIN — Medication 100 MG: at 09:25

## 2018-03-03 RX ADMIN — FOLIC ACID 1 MG: 1 TABLET ORAL at 09:25

## 2018-03-03 RX ADMIN — SIMVASTATIN 20 MG: 20 TABLET, FILM COATED ORAL at 17:42

## 2018-03-03 RX ADMIN — OXYCODONE HYDROCHLORIDE AND ACETAMINOPHEN 1 TABLET: 7.5; 325 TABLET ORAL at 18:39

## 2018-03-03 RX ADMIN — LORAZEPAM 1 MG: 2 INJECTION INTRAMUSCULAR; INTRAVENOUS at 17:33

## 2018-03-03 RX ADMIN — HEPARIN SODIUM 5000 UNITS: 5000 INJECTION, SOLUTION INTRAVENOUS; SUBCUTANEOUS at 22:29

## 2018-03-03 RX ADMIN — LORAZEPAM 2 MG: 2 INJECTION INTRAMUSCULAR; INTRAVENOUS at 19:54

## 2018-03-03 RX ADMIN — DULOXETINE HYDROCHLORIDE 20 MG: 20 CAPSULE, DELAYED RELEASE ORAL at 11:11

## 2018-03-03 RX ADMIN — HEPARIN SODIUM 5000 UNITS: 5000 INJECTION, SOLUTION INTRAVENOUS; SUBCUTANEOUS at 15:51

## 2018-03-03 RX ADMIN — LEVOTHYROXINE SODIUM 50 MCG: 50 TABLET ORAL at 06:47

## 2018-03-03 RX ADMIN — Medication 10 ML: at 14:07

## 2018-03-03 RX ADMIN — THERA TABS 1 TABLET: TAB at 20:35

## 2018-03-03 RX ADMIN — SODIUM CHLORIDE AND POTASSIUM CHLORIDE: 9; 2.98 INJECTION, SOLUTION INTRAVENOUS at 03:05

## 2018-03-03 RX ADMIN — Medication 2 G: at 03:06

## 2018-03-03 RX ADMIN — LORAZEPAM 2 MG: 2 INJECTION INTRAMUSCULAR; INTRAVENOUS at 20:36

## 2018-03-03 RX ADMIN — Medication 10 ML: at 00:14

## 2018-03-03 RX ADMIN — LORAZEPAM 2 MG: 2 INJECTION INTRAMUSCULAR; INTRAVENOUS at 22:30

## 2018-03-03 RX ADMIN — LORAZEPAM 1 MG: 2 INJECTION INTRAMUSCULAR; INTRAVENOUS at 11:12

## 2018-03-03 RX ADMIN — OXYCODONE HYDROCHLORIDE AND ACETAMINOPHEN 1 TABLET: 7.5; 325 TABLET ORAL at 09:31

## 2018-03-03 RX ADMIN — PROCHLORPERAZINE EDISYLATE 5 MG: 5 INJECTION INTRAMUSCULAR; INTRAVENOUS at 20:36

## 2018-03-03 RX ADMIN — SODIUM CHLORIDE AND POTASSIUM CHLORIDE: 9; 2.98 INJECTION, SOLUTION INTRAVENOUS at 14:12

## 2018-03-03 RX ADMIN — OXYCODONE HYDROCHLORIDE AND ACETAMINOPHEN 1 TABLET: 7.5; 325 TABLET ORAL at 14:06

## 2018-03-03 RX ADMIN — HEPARIN SODIUM 5000 UNITS: 5000 INJECTION, SOLUTION INTRAVENOUS; SUBCUTANEOUS at 09:25

## 2018-03-03 RX ADMIN — Medication 2 G: at 11:11

## 2018-03-03 RX ADMIN — Medication 2 G: at 20:35

## 2018-03-03 RX ADMIN — Medication 10 ML: at 06:47

## 2018-03-03 NOTE — CONSULTS
ORTHOPAEDIC FRACTURE CONSULT NOTE    Subjective:     Date of Consultation:  March 2, 2018      Melanie Cote is a 61 y.o. female who is being seen for right ankle pain. Pt with hx of HTN, alcohol abuse, neuropathy, depression, memory loss. Pt fell this past Tuesday at midnight, felt like she sprained her ankle, continue to walk on it with crutches. This morning, she noted that the inside of her ankle started to bleed and would not stop. Pt brought to ER via EMS. Radiographs reveal right bimalleolar ankle fx dislocation. Pt has an open area on the medial aspect with a bone shard sticking through approx. 3 mm. Pt lives at home with her . No chest pain, no respiratory disease. Pt with hx of breast cancer, s.p bilateral mastectomy. Pt with hx of alcohol abuse, per  was sober x 12 weeks, went on drinking binge x 6 days 2 weeks ago and since has been drinking off and on. Her last drink was this am. Pt has had hx of falls in the past, one resulting in subarachnoid hemorrhage. Patient denies chest pain, tightness, pain radiating down left arm, palpitations, dizziness, lightheadedness, fevers, chills. Patient denies shortness of breath, wheezing, diarrhea, constipation, abdominal pain. Patient denies urinary problems, dysuria, hesitancy, urgency. Denies skin breakdown, rashes, insect bites or open area. Pt last ate at noon (granola bar). Last drank at 2000    Patient Active Problem List    Diagnosis Date Noted    Numbness and tingling of both legs below knees 11/30/2015    Numbness in feet 10/23/2015    Burning sensation of feet 10/23/2015    SAH (subarachnoid hemorrhage) ([facility] [address] 10/23/2015     Family History   Problem Relation Age of Onset    Cancer Mother     Other Father       Social History   Substance Use Topics    Smoking status: Never Smoker    Smokeless tobacco: Not on file    Alcohol use No      Comment: last drink was this am.  prior to that, last drink was friday.       Past Medical History: Diagnosis Date    Alcohol abuse     Anxiety     Depression     Fatigue     Hypertension     Memory loss     Vertigo     Visual disturbance       Past Surgical History:   Procedure Laterality Date    HX APPENDECTOMY      HX ORTHOPAEDIC      right knee repair    HX OTHER SURGICAL  04/2016    double mastectomy     HX OTHER SURGICAL  06/2016    reconstructive surgery on her breasts      Prior to Admission medications    Medication Sig Start Date End Date Taking? Authorizing Provider   DULoxetine (CYMBALTA) 60 mg capsule TAKE ONE CAPSULE BY MOUTH EVERY DAY 1/3/17  Yes Sid Obrien NP   letrozole [facility]) 2.5 mg tablet TAKE 1 TABLET BY MOUTH EVERY DAY 7/18/16  Yes Historical Provider   traZODone (DESYREL) 50 mg tablet TAKE 1 TO 2 TABLETS BY MOUTH AT BEDTIME IF NEEDED 8/1/16  Yes Historical Provider   simvastatin (ZOCOR) 20 mg tablet TAKE 1 TABLET BY MOUTH IN THE EVENING 8/2/16  Yes Historical Provider   losartan (COZAAR) 50 mg tablet  10/11/15  Yes Historical Provider   levothyroxine (SYNTHROID) 50 mcg tablet  9/24/15  Yes Historical Provider   Hydrochlorothiazide (HYDRODIURIL) 12.5 mg tablet Take 12.5 mg by mouth daily. Dose unknown   Yes Carmen Grove MD   multivitamin, tx-iron-ca-min (THERA-M W/ IRON) 9 mg iron-400 mcg tab tablet Take 1 Tab by mouth daily. Yes Carmen Grove MD   gabapentin (NEURONTIN) 300 mg capsule TAKE 3 CAPSULES BY MOUTH EVERY MORNING ,THEN TAKE 2 CAPSULES AT LUNCH, AND TAKE 3 CAPSULES AT BETIME 1/26/17   Sid Obrien NP   pregabalin (LYRICA) 75 mg capsule Take 1 Cap by mouth daily. Max Daily Amount: 75 mg. 9/9/16   Sid Obrien NP   AZITHROMYCIN PO Take 875 mg by mouth two (2) times a day. Historical Provider   pregabalin (LYRICA) 75 mg capsule Take 1 Cap by mouth three (3) times daily. Max Daily Amount: 225 mg. 8/24/16   Sid Obrien NP   disulfiram (ANTABUSE) 250 mg tablet  9/27/15   Historical Provider   nebivolol (BYSTOLIC) 10 mg tablet Take 20 mg by mouth daily.  Dose unknown Carmen Grove MD     Current Facility-Administered Medications   Medication Dose Route Frequency    sodium chloride (NS) flush 5-10 mL  5-10 mL IntraVENous Q8H    sodium chloride (NS) flush 5-10 mL  5-10 mL IntraVENous PRN    piperacillin-tazobactam (ZOSYN) 3.375 g in 0.9% sodium chloride 100 mL IVPB  3.375 g IntraVENous NOW    sodium chloride 0.9 % bolus infusion 1,000 mL  1,000 mL IntraVENous CONTINUOUS     Current Outpatient Prescriptions   Medication Sig    DULoxetine (CYMBALTA) 60 mg capsule TAKE ONE CAPSULE BY MOUTH EVERY DAY    letrozole (FEMARA) 2.5 mg tablet TAKE 1 TABLET BY MOUTH EVERY DAY    traZODone (DESYREL) 50 mg tablet TAKE 1 TO 2 TABLETS BY MOUTH AT BEDTIME IF NEEDED    simvastatin (ZOCOR) 20 mg tablet TAKE 1 TABLET BY MOUTH IN THE EVENING    losartan (COZAAR) 50 mg tablet     levothyroxine (SYNTHROID) 50 mcg tablet     Hydrochlorothiazide (HYDRODIURIL) 12.5 mg tablet Take 12.5 mg by mouth daily. Dose unknown    multivitamin, tx-iron-ca-min (THERA-M W/ IRON) 9 mg iron-400 mcg tab tablet Take 1 Tab by mouth daily.  gabapentin (NEURONTIN) 300 mg capsule TAKE 3 CAPSULES BY MOUTH EVERY MORNING ,THEN TAKE 2 CAPSULES AT LUNCH, AND TAKE 3 CAPSULES AT BETIME    pregabalin (LYRICA) 75 mg capsule Take 1 Cap by mouth daily. Max Daily Amount: 75 mg.    AZITHROMYCIN PO Take 875 mg by mouth two (2) times a day.  pregabalin (LYRICA) 75 mg capsule Take 1 Cap by mouth three (3) times daily. Max Daily Amount: 225 mg.    disulfiram (ANTABUSE) 250 mg tablet     nebivolol (BYSTOLIC) 10 mg tablet Take 20 mg by mouth daily. Dose unknown      Allergies   Allergen Reactions    Sulfa (Sulfonamide Antibiotics) Other (comments)     Flushing and hot feeling         Review of Systems:  A comprehensive review of systems was negative except for that written in the HPI.     Mental Status: hx of memory loss    Objective:     Patient Vitals for the past 8 hrs:   BP Temp Pulse Resp SpO2 Weight   03/02/18 1828 138/76 98.5 °F (36.9 °C) 65 15 97 % 70.3 kg (155 lb)     Temp (24hrs), Av.5 °F (36.9 °C), Min:98.5 °F (36.9 °C), Max:98.5 °F (36.9 °C)      Gen: Well-developed,  in no acute distress   HEENT: Pink conjunctivae, PERRL, hearing intact to voice, moist mucous membranes   Musc: Right ankle with open area of medial malleolus, size of a quarter. 3mm-5mm bone shard penetrating through skin. Skin is ecchymotic/ generalized swelling and displaced laterally. Active bleeding from open area. Pulses audible from doppler. Able to wiggle toes without difficulty. Skin: No skin breakdown noted. Skin warm, pink, dry  Neuro: Cranial nerves are grossly intact, no focal motor weakness, follows commands appropriately   Psych: Good insight, oriented to person, place and time, alert    Imaging Review:  EXAM:  XR TIB/FIB RT, XR ANKLE RT MIN 3 V     INDICATION:  fx.  Right ankle deformity after a fall     COMPARISON: None.     FINDINGS: AP and lateral  views of the right tibia and fibula. 3 views of the  right ankle.      The patient is status post ORIF of the patella. There is mild medial compartment  osteophytosis in the knee. There is chronic deformity of the fibular neck. There  is a displaced fracture of the medial malleolus. The medial malleolus is 1.5 cm  from the distal tibia. There is a comminuted displaced fracture of the lateral  malleolus at the level of the tibiotalar joint.  The talus and distal lateral  malleolus are dislocated laterally by 2.8 cm and slightly retracted.     IMPRESSION  IMPRESSION: Bimalleolar ankle fracture dislocation.       Labs:   Recent Results (from the past 24 hour(s))   GLUCOSE, POC    Collection Time: 18  6:42 PM   Result Value Ref Range    Glucose (POC) 130 (H) 65 - 100 mg/dL    Performed by Deepa Jones          Impression:     Patient Active Problem List    Diagnosis Date Noted    Numbness and tingling of both legs below knees 2015    Numbness in feet 10/23/2015    Burning sensation of feet 10/23/2015    SAH (subarachnoid hemorrhage) ([facility] [address] 10/23/2015     Active Problems:    * No active hospital problems. *      Plan:   60 yo with right open bimalleolar ankle fracture/ dislocation  Due to severe neuropathy, attempted to reduce ankle, but unsuccessful. Covered with betadine soaked guaze. Applied sugartong splint and posterior splint in position of comfort. Pt to go up to OR for I and D/ poss ex fix/ poss ORIF by Dr. Randi Mckeon.   Discussed in detail with patient and . Will need to watch for DT's with hx of alcohol abuse. Pt to remain NPO. Case posted for 2015.      Rod Rand NP

## 2018-03-03 NOTE — OP NOTES
[address] REPORT    Jevon Humphrey  MR#: [id number]  : 1955  ACCOUNT #: [de-identified]   DATE OF SERVICE: 2018    SURGEON:  Monster Rabago MD    PREOPERATIVE DIAGNOSES:  1. Right ankle type 2 open bimalleolar fracture/dislocation. 2.  History of bilateral lower extremity peripheral neuropathy. 3.  History of alcohol dependence with history of DTs  4.  Bilateral lower extremity peripheral neuropathy  5. Acute renal failure. POSTOPERATIVE DIAGNOSES:    1. Right ankle type 2 open bimalleolar fracture dislocation. 2.  History of bilateral lower extremity peripheral neuropathy. 3.  History of alcohol dependence with history of DTs  4.  Bilateral lower extremity peripheral neuropathy  5. Acute renal failure. OPERATION PERFORMED:  1. Right ankle spanning external fixation of open bimalleolar fracture, CPT code 99110.  2.  Right ankle irrigation and debridement of skin and subcutaneous tissue secondary to an open fracture, CPT code 01413. ANESTHESIA:  General.    OPERATIVE INDICATIONS:  The patient is a 60-year-old female with a history of alcohol dependence and bilateral lower extremity peripheral neuropathy who sustained a fall in her home on 2018. She thought she sprained her ankle at that time. She continued to walk on her right lower extremity and over the next several days, and noticed a deformity and eventually a small wound over the medial ankle. She presented to [facility] Emergency Room 2018 for evaluation. Clinical exam was consistent with an open fracture wound over the medial ankle. She had a bimalleolar fracture dislocation on her x-rays. She was brought emergently to the operating room for irrigation, debridement, closed reduction with spanning ankle external fixation. Risks, benefits and alternatives of surgery were discussed in detail with the patient.   Specific risks include, but are not limited to pin site infection, wound infection, bleeding, neurovascular injury, hardware complications, DVT, PE and anesthetic complication. The patient signed the appropriate surgical consent. She understands that this is the first stage of a 2-stage operation. The second stage being a conversion to an open reduction internal fixation once her swelling subsides. DESCRIPTION OF OPERATION:  Her right ankle was marked in the preoperative holding area. She was brought to the operating room, laid supine where general anesthesia was administered. The right lower extremity was then prepped and draped in the usual sterile fashion. A routine preoperative timeout was performed with the entire operating room staff per hospital protocol. She did receive Ancef 2 grams for preoperative antibiotics. A closed reduction was performed and I was able to reduce her ankle nearly anatomically. She had a comminuted medial malleolus and distal fibula fracture. She had a 2 cm wound over the medial malleolus. This was irrigated thoroughly with gravity tubing utilizing 6 liters of bacitracin impregnated normal saline. Debridement of any devitalized skin and subcutaneous tissue was performed with a rongeur and a 15 blade. Once the wound was adequately irrigated and debrided, it was then closed with 3-0 nylon suture using a modified Donati stitch. I then proceeded with spanning right ankle external fixation. Two 5 x 120 mm apex pins were then placed in the proximal third of her tibia shaft under C-arm imaging. A 5 mm transfixing calcaneal pin was then placed from medial to lateral in the central to posterior aspect of her calcaneus, again under lateral C-arm imaging. I used a 5-hole pin clamp along the proximal apex pins in the tibia and connected the lyn-to-lyn couplers and utilized two 11 x 350 mm connecting bars from the couplers along the tibia to the couplers along the calcaneal transfixing pin.   I utilized a semicircular lyn for a kickstand to connect 2 additional lyn-to-lyn couplers. With traction on the calcaneal pin and slight internal rotation, her talus was reduced under the tibia and fracture alignment was stable. Final AP, mortise and lateral images were obtained. The screws were tightened one final time. The wounds were then cleaned and dressed with Xeroform, 4 x 4's, ABD, sterile webril and a sterile Ace bandage. The patient was awoken from anesthesia and transferred to hospital bed, taken to PACU in stable condition. COUNTS:  Sponge and instrument counts correct x 2. COMPLICATIONS:  None. ESTIMATED BLOOD LOSS:  Less than 10 mL. SPECIMENS REMOVED:  None    IMPLANTS:  Lacey Pierson External Fixator Set    POSTOPERATIVE PLAN:  Will be admission for IV antibiotics for 48 hours. She will be started on subcutaneous heparin for DVT prophylaxis on postoperative day #1. She will have Ativan as needed and will be monitored for delirium tremens postoperatively. She will be seen by the hospitalist for history of acute renal failure. A UA and culture were sent intraoperatively.       MD LILO Dorado /   D: 03/02/2018 22:45     T: 03/03/2018 09:50  JOB #: [id number]

## 2018-03-03 NOTE — PERIOP NOTES
TRANSFER - OUT REPORT:    Verbal report given to Naresh Costa on Aurelia Varela  being transferred to [address] [place] [address] for routine progression of care       Report consisted of patients Situation, Background, Assessment and   Recommendations(SBAR). Information from the following report(s) OR Summary and Intake/Output was reviewed with the receiving nurse. Lines:   Peripheral IV 03/02/18 Left Forearm (Active)   Site Assessment Clean, dry, & intact 3/2/2018 10:00 PM   Phlebitis Assessment 0 3/2/2018 10:00 PM   Infiltration Assessment 0 3/2/2018 10:00 PM   Dressing Status Clean, dry, & intact 3/2/2018 10:00 PM   Hub Color/Line Status Green; Infusing 3/2/2018 10:00 PM       Peripheral IV 03/02/18 Right Antecubital (Active)   Site Assessment Clean, dry, & intact 3/2/2018 10:00 PM   Phlebitis Assessment 0 3/2/2018 10:00 PM   Infiltration Assessment 0 3/2/2018 10:00 PM   Dressing Status Occlusive 3/2/2018 10:00 PM   Dressing Type Transparent 3/2/2018 10:00 PM   Hub Color/Line Status Pink;Capped 3/2/2018 10:00 PM        Opportunity for questions and clarification was provided.       Patient transported with:   Registered Nurse

## 2018-03-03 NOTE — PROGRESS NOTES
Orthopaedic Progress Note  Post Op day: 1 Day Post-Op    March 3, 2018 11:10 AM     Patient: Rhys Hernandez MRN: [id number]  SSN: [id number]    YOB: 1955  Age: 61 y.o. Sex: female      Admit date:  3/2/2018  Date of Surgery:  3/2/2018   Procedures:  Procedure(s):  1812  Admitting Physician:  Cheri Navarro MD   Surgeon:  Aldo Fuentes) and Role:     * Cheri Navarro MD - Primary    Consulting Physician(s): Treatment Team: Attending Provider: Cheri Navarro MD; Consulting Provider: Cheri Navarro MD; Physician: Yonathan Angel MD    SUBJECTIVE:     Rhys Hernandez is a 61 y.o. female is 1 Day Post-Op s/p Procedure(s):  ANKLE OPEN REDUCTION INTERNAL FIXATION with an appropriate level of post-operative pain. No complaints of nausea, vomiting, dizziness, lightheadedness, chest pain, or shortness of breath. Pt feeling anxious, uncomfortable with RLE elevated on pillow. OBJECTIVE:       Physical Exam:  General: Alert, cooperative, no distress. PT is very tearful, patient has many questions about plan. Respiratory: Respirations unlabored  Neurological:  Neurovascular exam within normal limits. Motor: + DF/PF. Musculoskeletal: Calves soft, supple, non-tender upon palpation. Dressing/Wound:  RLE with ex fix intact, wrapped with ace bandage, bloody drainage on pillow/ shadowing on ace bandage at heel. Wiggles toes, full sensation. Pulses +2. Skin warm/ intact. Reinforced dressing with additional ace.        Vital Signs:      Patient Vitals for the past 8 hrs:   BP Temp Pulse Resp SpO2   18 0747 107/62 99.3 °F (37.4 °C) 89 16 98 %   18 0420 91/61 98.6 °F (37 °C) 77 18 96 %                                          Temp (24hrs), Av.3 °F (36.8 °C), Min:97.9 °F (36.6 °C), Max:99.3 °F (37.4 °C)      Labs:        Recent Labs      18   1856   HCT  36.4   HGB  12.5   INR  1.1     Lab Results   Component Value Date/Time    Sodium 134 (L) 03/02/2018 06:56 PM    Potassium 3.1 (L) 03/02/2018 06:56 PM    Chloride 93 (L) 03/02/2018 06:56 PM    CO2 26 03/02/2018 06:56 PM    Glucose 119 (H) 03/02/2018 06:56 PM    BUN 42 (H) 03/02/2018 06:56 PM    Creatinine 3.16 (H) 03/02/2018 06:56 PM    Calcium 9.1 03/02/2018 06:56 PM       PT/OT:                Patient mobility                         ASSESSMENT / PLAN:   Principal Problem:    Ankle fracture, right (3/2/2018)    Active Problems:    MARYJO (acute kidney injury) ([facility] [address] (3/3/2018)      Hypokalemia due to loss of potassium (3/3/2018)      HTN (hypertension), benign (3/3/2018)      Depression with anxiety (3/3/2018)      Dyslipidemia (3/3/2018)          Right bimalleolar ankle fracture/ dislocation ; s/p right ankle I and D/ external fixation.    - IV abx x 48 hours.   -Check CT of right ankle.   - PT/ OT; transfers only. NWB. Dr. Antonino Chu to discuss with Dr. Lisa Richardson about timing of ORIF. Possibly need rehab prior to ORIF          Pain Control: Adequate with oral agents and PRN IV narcotics    DVT Prophylaxis: Heparin 5000 SQ Q8 hours   Therapy/ Weight bearing: NWB;  PT for transfers into chair. Wound/ incisional issues: Drainage noted on ace bandage/ reinforce for now. Medical concerns: Alcohol withdraw: CIWA protocol. Requiring dose of Ativan this am.   FAREED: hydration. To recheck labs. Family practice following   Disposition: To be determine. Case management consulted for possible placement.       Signed By:  Parvin Crawford NP    Orthopedic [place]  [facility]

## 2018-03-03 NOTE — ED NOTES
TRANSFER - OUT REPORT:    Verbal report given to Javier Michele (name) on Colt Orozco  being transferred to Pre-op (unit) for routine progression of care       Report consisted of patients Situation, Background, Assessment and   Recommendations(SBAR). Information from the following report(s) SBAR, ED Summary, [facility] and Recent Results was reviewed with the receiving nurse. Lines:       Opportunity for questions and clarification was provided.       Patient transported with:  OR Staff

## 2018-03-03 NOTE — PROGRESS NOTES
Physical Therapy:  Orders received and chart reviewed. Attempted to see patient, patient with increased anxiety with explanation of role of physical therapy. Patient requests to eat her lunch (\"due to waiting a long time in the first place\") and pain medication prior to mobilization. Nursing notified for pain medication. We will re-attempt later.   Thank you  Sophia Garner PT,DPT,NCS

## 2018-03-03 NOTE — H&P
[facility]  ACUTE CARE HISTORY AND PHYSICAL    Jessie KHALIL  MR#: [id number]  : 1955  ACCOUNT #: [de-identified]   DATE OF SERVICE: 2018    ORTHOPEDIC SURGERY HISTORY AND PHYSICAL     ADMITTING PHYSICIAN:  Yesenia Lopez. Raymond Babinski, MD     HISTORY OF PRESENT ILLNESS:  12-year-old female with strong history of alcohol dependence, severe bilateral lower extremity peripheral neuropathy, and hypertension who reported walking in her home roughly three days ago, slipped and fell in her bathroom. She sustained what she thought was a right ankle sprain at the time. She had a significant right ankle swelling and some deformity, but really did not have much pain associated with the injury. She continued to weightbear over the next few days. She noticed today that her deformity had gotten slightly worse and she had some drainage which she thought was related to a scab along the medial ankle. She noticed a bony prominence along the medial side of the ankle as well. She was brought to the [facility] where evaluation revealed an open medial malleolus fracture and a significantly deformed right ankle. X-rays were obtained demonstrating a right ankle bimalleolar fracture dislocation. The orthopedic team was consulted. The ankle was splinted and she was brought to the OR for an emergent irrigation, debridement and external fixation. PAST MEDICAL HISTORY:  1. History of alcohol dependence, had stopped drinking for 3 months from 2017-2018, but restarted last week. Has had reported DTs following history of breast surgery. 2.  Bilateral lower extremity peripheral neuropathy. 3.  Hypertension. 4.  Vertigo. 5.  History of MRSA. 6.  History of breast cancer. PAST SURGICAL HISTORY:   1) Right patella ORIF  2) History of breast surgery    FAMILY HISTORY:  Cancer.     SOCIAL HISTORY:  She is , when she drinks she prefers wine and trys to conceal her drinking from her . Has a strained relationship with  and children secondary to her alcohol history. Has been to numerous alcohol treatment centers and support groups. Has had a sponsor. She is a nonsmoker. HOME MEDICATIONS:  Listed per chart. ALLERGIES:  NONE. PHYSICAL EXAMINATION:  GENERAL:  She is alert and oriented x 3, no acute distress. HEENT:  Normocephalic, atraumatic. NECK:  Supple. No JVD. CARDIOVASCULAR:  Regular rate and pulse. PULMONARY:  Nonlabored bilaterally. ABDOMEN:  Soft, nontender. MUSCULOSKELETAL:  Right ankle demonstrates extensive deformity with swelling and ecchymosis of the ankle predominantly over the medial side. Minimal skin wrinkles. There is an open fracture along the medial malleolus to include a 2.5 cm oblique wound. No cellulitic change. NEUROLOGIC:  She has intact right lower extremity tibialis anterior, extensor hallucis longus, gastrocsoleus function. She has extensive loss to light touch consistent with her neuropathy. Palpable distal pulses. X-rays tibia and fibula and AP and lateral of the right ankle were ordered and were reviewed. These demonstrate a severe right ankle bimalleolar fracture dislocation with a distal fibular fracture at the level of the tibiotalar joint. Comminuted medial malleolus fracture. LABORATORY RESULTS:  White blood cell count 10.7, hemoglobin 12.5, hematocrit 36.4, platelets 521. Sodium 134, potassium 3.1, BUN 32, creatinine 3. 16.    ASSESSMENT:  78-year-old female with:  1. Type 2 open right ankle bimalleolar fracture/dislocation. 2.  History of alcohol dependence with history of DTs.  3.  Bilateral lower extremity peripheral neuropathy. 4.  Acute renal failure. 5.  Hypertension. PLAN:  I discussed these findings with the patient and her .  I explained the severity of this injury and that this can potentially be limb threatening if she develops a future infection such as osteomyelitis, malunion, nonunion, or other hardware complications. My recommendation is for an emergent right ankle irrigation and debridement with spanning external fixation. The risks, benefits and alternatives of surgery were discussed in detail with them. Specific risks include, but are not limited to pin site infection, wound infection, bleeding, neurovascular injury, hardware complications, DVT, PE, and anesthetic complications. She will be kept 48 hours for IV antibiotics. She will need eventual conversion to an open reduction internal fixation once her swelling subsides, likely within the next two to three weeks. In the interim she may need skilled nursing facility placement. The patient voiced understanding of the severity of the injury and all of her questions were answered.       MD LILO Mills / [place]  D: 03/02/2018 20:38     T: 03/02/2018 21:16  JOB #: [id number]

## 2018-03-03 NOTE — PROGRESS NOTES
Problem: Mobility Impaired (Adult and Pediatric)  Goal: *Acute Goals and Plan of Care (Insert Text)  Physical Therapy Goals  Initiated 3/3/2018  1. Patient will move from supine to sit and sit to supine  in bed with supervision within 7 day(s). 2.  Patient will transfer from bed to chair and chair to bed with minimal assistance using the least restrictive device within 7 day(s). 3.  Patient will perform sit to stand with minimal assistance within 7 day(s). 4.  Patient will ambulate with minimal assistance for 25 feet with the least restrictive device within 7 day(s). physical Therapy EVALUATION  Patient: Jayne Quinteros (75 y.o. female)  Date: 3/3/2018  Primary Diagnosis: RIGHT ANKLE FRACTURE  Ankle fracture, right  Procedure(s) (LRB):  ANKLE OPEN REDUCTION INTERNAL FIXATION (Right) 1 Day Post-Op   Precautions:   Fall, NWB (Right LE, transfers only)    ASSESSMENT :  Based on the objective data described below, the patient presents with increased pain, decreased strength, ROM, and balance following admission for Right bimalleolar ankle fracture/ dislocation ; s/p right ankle I and D/ external fixation. .  Patient is now NWB on the Right LE, transfers only. She was educated on NWB and she verbalized understanding. Patient initially sustained the fall and break 1 week prior, attempted mobility at home with RW with continued increased pain and deformity and then she presented to ER yesterday. Patient has the following co-morbidities impacting her mobility: ETOH abuse, anxiety, subarachnoid hemorrhage, and bilateral peripheral neuropathy. Patient very resistant to attempts at standing or RW usage with this session. Instructed patient in sliding board sequence and technique as a modification and she was able to transfer to drop arm bedside commode with MOD A x2. Patient requires constant cuing throughout for sequencing instruction. Patient required assistance for Right LE management due to external fixator. She potientially will need additional surgery and fixator removal and will have a prolonged recovery course. She would benefit from rehab at discharge for improved transfer sequence, safety and performance in addition to carry over of NWB status. .    Patient will benefit from skilled intervention to address the above impairments. Patients rehabilitation potential is considered to be Fair  Factors which may influence rehabilitation potential include:   []         None noted  []         Mental ability/status  []         Medical condition  []         Home/family situation and support systems  [x]         Safety awareness  []         Pain tolerance/management  []         Other:      PLAN :  Recommendations and Planned Interventions:  [x]           Bed Mobility Training             [x]    Neuromuscular Re-Education  [x]           Transfer Training                   []    Orthotic/Prosthetic Training  [x]           Gait Training                         []    Modalities  [x]           Therapeutic Exercises           []    Edema Management/Control  [x]           Therapeutic Activities            [x]    Patient and Family Training/Education  []           Other (comment):    Frequency/Duration: Patient will be followed by physical therapy  5 times a week to address goals. Discharge Recommendations: Rehab  Further Equipment Recommendations for Discharge: TBD at rehab,     SUBJECTIVE:   Patient stated Rosemary Purple can not stand, I will try in a few days.   This leg needs to rest.    OBJECTIVE DATA SUMMARY:   HISTORY:    Past Medical History:   Diagnosis Date    Alcohol abuse     Anxiety     Depression     Fatigue     Hypertension     Memory loss     Vertigo     Visual disturbance      Past Surgical History:   Procedure Laterality Date    HX ANKLE FRACTURE TX Right 12/2016    HX APPENDECTOMY      HX ORTHOPAEDIC      right knee repair    HX OTHER SURGICAL  04/2016    double mastectomy     HX OTHER SURGICAL  06/2016 reconstructive surgery on her breasts    HX SHOULDER REPLACEMENT Right 2016    HX WRIST FRACTURE TX Left 2016     Prior Level of Function/Home Situation: see above  Personal factors and/or comorbidities impacting plan of care:     Home Situation  Home Environment: Apartment (condo)  One/Two Story Residence: One story  Living Alone: No  Support Systems: Spouse/Significant Other/Partner  Patient Expects to be Discharged to[de-identified] Apartment  Current DME Used/Available at Home: None    EXAMINATION/PRESENTATION/DECISION MAKING:   Critical Behavior:  Neurologic State: Appropriate for age, Alert  Orientation Level: Oriented X4  Cognition: Appropriate decision making, Appropriate for age attention/concentration, Appropriate safety awareness, Follows commands     Hearing: Auditory  Auditory Impairment: None  Skin:  All exposed intact  Edema: none noted  Range Of Motion:  AROM: Grossly decreased, non-functional           PROM: Grossly decreased, non-functional           Strength:    Strength: Grossly decreased, non-functional                    Tone & Sensation:   Tone: Normal              Sensation: Intact               Coordination:  Coordination: Grossly decreased, non-functional  Vision:      Functional Mobility:  Bed Mobility:  Rolling: Stand-by assistance  Supine to Sit: Minimum assistance  Sit to Supine: Minimum assistance     Transfers:                       Sliding Board :  Moderate assistance     Balance:   Sitting: Intact  Standing: Impaired  Ambulation/Gait Training:      unable at this time    Therapeutic Exercises:   Supine: quad sets  Instructed in toe flex/ext on the Right    Functional Measure:  Barthel Index:    Bathin  Bladder: 10  Bowels: 10  Groomin  Dressin  Feeding: 10  Mobility: 5  Stairs: 0  Toilet Use: 5  Transfer (Bed to Chair and Back): 5  Total: 55       Barthel and G-code impairment scale:  Percentage of impairment CH  0% CI  1-19% CJ  20-39% CK  40-59% CL  60-79% CM  80-99% CN  100%   Barthel Score 0-100 100 99-80 79-60 59-40 20-39 1-19   0   Barthel Score 0-20 20 17-19 13-16 9-12 5-8 1-4 0      The Barthel ADL Index: Guidelines  1. The index should be used as a record of what a patient does, not as a record of what a patient could do. 2. The main aim is to establish degree of independence from any help, physical or verbal, however minor and for whatever reason. 3. The need for supervision renders the patient not independent. 4. A patient's performance should be established using the best available evidence. Asking the patient, friends/relatives and nurses are the usual sources, but direct observation and common sense are also important. However direct testing is not needed. 5. Usually the patient's performance over the preceding 24-48 hours is important, but occasionally longer periods will be relevant. 6. Middle categories imply that the patient supplies over 50 per cent of the effort. 7. Use of aids to be independent is allowed. Lisa Falk., Barthel, DKarloW. (0827). Functional evaluation: the Barthel Index. [facility] (14)2. TAMI QuezadaF, Hiram Aguirre., Asa Norwood., [place], [address] (1999). Measuring the change indisability after inpatient rehabilitation; comparison of the responsiveness of the Barthel Index and Functional [place] Measure. Journal of Neurology, Neurosurgery, and Psychiatry, 66(4), 642-147. Ramón Sharma, NKarloJ.A, NEYDA Winkler, & Esa Lopez MMARLEN. (2004.) Assessment of post-stroke quality of life in cost-effectiveness studies: The usefulness of the Barthel Index and the EuroQoL-5D. Quality of Life Research, 13, 333-20       G codes: In compliance with CMSs Claims Based Outcome Reporting, the following G-code set was chosen for this patient based on their primary functional limitation being treated:     The outcome measure chosen to determine the severity of the functional limitation was the Barthel Index with a score of 55/100 which was correlated with the impairment scale. ? Mobility - Walking and Moving Around:     - CURRENT STATUS: CK - 40%-59% impaired, limited or restricted    - GOAL STATUS: CJ - 20%-39% impaired, limited or restricted    - D/C STATUS:  ---------------To be determined---------------      Physical Therapy Evaluation Charge Determination   History Examination Presentation Decision-Making   HIGH Complexity :3+ comorbidities / personal factors will impact the outcome/ POC  HIGH Complexity : 4+ Standardized tests and measures addressing body structure, function, activity limitation and / or participation in recreation  MEDIUM Complexity : Evolving with changing characteristics  Other outcome measures Barthel Index  MEDIUM      Based on the above components, the patient evaluation is determined to be of the following complexity level: MEDIUM    Pain:                    Activity Tolerance:   Fair- vitals stable  Please refer to the flowsheet for vital signs taken during this treatment. After treatment:   []         Patient left in no apparent distress sitting up in chair  [x]         Patient left in no apparent distress in bed  [x]         Call bell left within reach  [x]         Nursing notified  []         Caregiver present  [x]         Bed alarm activated    COMMUNICATION/EDUCATION:   The patients plan of care was discussed with: Registered Nurse. [x]         Fall prevention education was provided and the patient/caregiver indicated understanding. [x]         Patient/family have participated as able in goal setting and plan of care. [x]         Patient/family agree to work toward stated goals and plan of care. []         Patient understands intent and goals of therapy, but is neutral about his/her participation. []         Patient is unable to participate in goal setting and plan of care.     Thank you for this referral.  Hailey Jackson, PT, DPT   Time Calculation: 21 mins

## 2018-03-03 NOTE — CONSULTS
IM Consult History and Physical:    NAME:    Jennie Yap   :     1955   MRN:  [id number]     PCP:  Serena Mccurdy MD     Referring Physician: Kayla Payan MD     Date:   3/3/2018      Reason for Consultaion: Post operative medical management    Chief Complaint: R ankle pain and swelling    History of presenting illness:     Ms. Huang Weber is a 61 y.o. female who is admitted to the orthopedic surgery service with a right ankle fracture. Ms. Huang Weber is being seen in medical consultation. She has a hx of HTN which has been relatively well controlled  Although she says she had been having some nausea and vomiting. She has a mild to moderate aching right ankle discomfort after she fell. She is sp debridement and immobilization. She was found to have MARYJO and a medical consult was requested. She has a hx hypothyroidism, depression with anxiety and hypothyroidism and takes her medications. Allergies   Allergen Reactions    Sulfa (Sulfonamide Antibiotics) Other (comments)     Flushing and hot feeling         Prior to Admission medications    Medication Sig Start Date End Date Taking? Authorizing Provider   carvedilol (COREG) 12.5 mg tablet Take  by mouth two (2) times daily (with meals). Yes Historical Provider   hydroCHLOROthiazide (HYDRODIURIL) 25 mg tablet Take 25 mg by mouth daily. Yes Historical Provider   letrozole [facility]) 2.5 mg tablet Take 2.5 mg by mouth daily.    Yes Historical Provider   gabapentin (NEURONTIN) 300 mg capsule TAKE 3 CAPSULES BY MOUTH EVERY MORNING ,THEN TAKE 2 CAPSULES AT LUNCH, AND TAKE 3 CAPSULES AT BETIME 17  Yes Guillermo Flor NP   DULoxetine (CYMBALTA) 60 mg capsule TAKE ONE CAPSULE BY MOUTH EVERY DAY 1/3/17  Yes Guillermo Flor NP   traZODone (DESYREL) 50 mg tablet TAKE 1 TO 2 TABLETS BY MOUTH AT BEDTIME IF NEEDED 16  Yes Historical Provider   simvastatin (ZOCOR) 20 mg tablet TAKE 1 TABLET BY MOUTH IN THE EVENING 8/2/16  Yes Historical Provider   losartan (COZAAR) 50 mg tablet Take 50 mg by mouth daily. 10/11/15  Yes Historical Provider   disulfiram (ANTABUSE) 250 mg tablet  9/27/15  Yes Historical Provider   levothyroxine (SYNTHROID) 50 mcg tablet Take 50 mcg by mouth Daily (before breakfast). 9/24/15  Yes Historical Provider   multivitamin, tx-iron-ca-min (THERA-M W/ IRON) 9 mg iron-400 mcg tab tablet Take 1 Tab by mouth daily. Yes Carmen Grove, MD       Past Medical History:   Diagnosis Date    Alcohol abuse     Anxiety     Depression     Fatigue     Hypertension     Memory loss     Vertigo     Visual disturbance         Past Surgical History:   Procedure Laterality Date    HX ANKLE FRACTURE TX Right 12/2016    HX APPENDECTOMY      HX ORTHOPAEDIC      right knee repair    HX OTHER SURGICAL  04/2016    double mastectomy     HX OTHER SURGICAL  06/2016    reconstructive surgery on her breasts    HX SHOULDER REPLACEMENT Right 11/2016    HX WRIST FRACTURE TX Left 12/2016       Social History   Substance Use Topics    Smoking status: Never Smoker    Smokeless tobacco: Never Used    Alcohol use 21.0 oz/week     35 Glasses of wine per week      Comment: last drink was 2/27        Family History   Problem Relation Age of Onset    Cancer Mother     Other Father         Review of Systems:    Constitutional ROS: no fever, chills, rigors or night sweats  Respiratory ROS: no cough, sputum, hemoptysis, dyspnea or pleuritic pain. Cardiovascular ROS: no chest pain, palpitation, orthopnea, PND or syncope  Endocrine ROS: no polydispsia, polyuria, heat or cold intolerance   Gastrointestinal ROS: no dysphagia, abdominal pain, nausea, vomiting, diarrhea or bleeding. Genito-Urinary ROS: no dysuria, frequency, hematuria, retention, or flank pain  Musculoskeletal ROS: right ankle pain.    Neurological ROS: no headache, confusion, diplopia, focal symptoms, peripheral paresthesia, tremor, or hallucinosis. Psychiatric ROS: ROS: no depression, anxiety, mood swings  Dermatological ROS: no rash, pruritis, or urticaria    On Examination:      Visit Vitals    /57 (BP 1 Location: Left arm, BP Patient Position: Head of bed elevated (Comment degrees))    Pulse 86    Temp 98.2 °F (36.8 °C)    Resp 19    Ht 5' 7\" (1.702 m)    Wt 70.3 kg (155 lb)    SpO2 98%    Breastfeeding No    BMI 24.28 kg/m2     SpO2 Readings from Last 6 Encounters:   03/03/18 98%   08/24/16 94%   02/08/16 99%   12/31/15 97%   12/07/15 97%   10/23/15 98%            O2 Flow Rate (L/min): 2 l/min    Physical Examination:    Gen:  Well but ill looking patient, not in much acute distress  Eyes: pink conjunctivae, PERRLA with no discharge. ENT:  no ottorrhea or rhinorrhea, moist mucous membranes  Neck:  Supple, no masses, thyroid non-tender with a central trachea. Pulm:  no accessory muscle use, clear breath sounds without crackles or wheezes  Card:  no JVD or murmurs, has regular and normal S1, S2 without thrills, bruits or pedal edema  Abd:  Soft, non-tender, non-distended, normoactive bowel sounds are present    Lymph:  No cervical or axillary adenopathy  Musc:  No cyanosis or clubbing. No atrophy or deformities. Splinted right foot  Skin:  No rashes, bruising or ulcers, skin turgor is good  Neuro: Awake and alert, CNs 2-12 intact with a non focal exam. Follows commands appropriately  Psych: Oriented x 3, no hallucinations or delusions. Other diagnostics: all radiological tests have been reviewed. Labs:    Recent Labs      03/02/18   1856   WBC  10.7   HGB  12.5   HCT  36.4   PLT  155     Recent Labs      03/02/18   1856   NA  134*   K  3.1*   CL  93*   CO2  26   GLU  119*   BUN  42*   CREA  3.16*   CA  9.1   ALB  4.1   SGOT  35   ALT  27     No components found for: [place]    Recent Labs      03/02/18   1856   INR  1.1     Xray right tibia, fibula - Bimalleolar ankle fracture dislocation.     Assessment/Recommendations: Ms. Dayna Salguero is a 61 y.o. female being reviewed for:     ANDERSON (acute kidney injury) ([facility] [address] (3/3/2018): likely due to volume depletion. Had vomiting, diuretic use. Hold Hydrodiuril and ARB. Start IVFs and follow renal function    Hypokalemia due to loss of potassium (3/3/2018): from diuretic use. DC hydrodiuril. Replete and follow K+    HTN (hypertension), benign (3/3/2018): Continue Coreg. Hold Losartan due to Anderson    Depression with anxiety (3/3/2018): resume Cymbalta    Dyslipidemia (3/3/2018): resume simvastatin     Ankle fracture, right (3/2/2018): ortho is attending. Post operative care as per primary team including pain control, DVT prophylaxis, disposition etc    Thank you for the privilege to participate in Ms. Clement's care. We will follow along with you.     Total time spent for care of the patient: [address] discussed with: Patient and Nursing Staff    Discussed:  Care Plan    Prophylaxis:  SCD's           ___________________________________________________    Attending Physician: Diomedes Kebede MD

## 2018-03-03 NOTE — ANESTHESIA POSTPROCEDURE EVALUATION
Post-Anesthesia Evaluation and Assessment    Patient: Rhys Hernandez MRN: [id number]  SSN: [id number]    YOB: 1955  Age: 61 y.o. Sex: female       Cardiovascular Function/Vital Signs  Visit Vitals    /74    Pulse 79    Temp 36.7 °C (98 °F)    Resp 11    Ht 5' 7\" (1.702 m)    Wt 70.3 kg (155 lb)    SpO2 100%    BMI 24.28 kg/m2       Patient is status post general anesthesia for Procedure(s):  ANKLE OPEN REDUCTION INTERNAL FIXATION. Nausea/Vomiting: None    Postoperative hydration reviewed and adequate. Pain:  Pain Scale 1: Numeric (0 - 10) (03/02/18 2230)  Pain Intensity 1: 0 (03/02/18 2230)   Managed    Neurological Status:   Neuro (WDL): Exceptions to WDL (03/02/18 2230)  Neuro  LUE Motor Response: Purposeful;Tingling;Numbness (03/02/18 2230)  LLE Motor Response: Purposeful;Numbness (03/02/18 2201)  RUE Motor Response: Purposeful;Tingling;Numbness (03/02/18 2230)  RLE Motor Response: Purposeful;Numbness (03/02/18 2201)   At baseline    Mental Status and Level of Consciousness: Arousable    Pulmonary Status:   O2 Device: Room air (03/02/18 2230)   Adequate oxygenation and airway patent    Complications related to anesthesia: None    Post-anesthesia assessment completed.  No concerns    Signed By: Nilson Clark MD     March 2, 2018

## 2018-03-03 NOTE — BRIEF OP NOTE
BRIEF OPERATIVE NOTE    Date of Procedure: 3/2/2018     Preoperative Diagnosis:  Type 2 open right ankle bimalleolar fracture/dislocation    Postoperative Diagnosis: Type 2 open right ankle bimalleolar fracture/dislocation    Procedure(s):  1) Right medial ankle wound irrigation and debridement  2) Right ankle spanning external fixation    Surgeon(s) and Role:     * Kay Mason MD - Primary         Assistant Staff: None  Surgical Staff:  Circ-1: Elisabet Rivera RN  Scrub Tech-1: Parmer Helm  Surg Asst-1: Tarun Mckee  Event Time In   Incision Start 2057   Incision Close 2150     Anesthesia: General   Estimated Blood Loss: < 10 cc  Specimens: None  ID Type Source Tests Collected by Time Destination   1 : URINE CULTURE AND UA Urine  CULTURE, URINE, ROUTINE URINALYSIS Kay Mason MD 3/2/2018 2111 Microbiology      Findings: Open bimalleolar fracture/dislocation  Complications: None  Implants:   Implant Name Type Inv.  Item Serial No.  Lot No. LRB No. Used Action   LUCIANO CONN [id number] -- MARIZA 3 - SNA  LUCIANO CONN [id number] -- MARIZA 3 NA [employer] [facility] NA Right 2 Implanted   PIN TRANSFIX 05/6MM [id number] -- APEX - SNA  PIN TRANSFIX 05/6MM [id number] -- APEX NA [employer] [facility] NA Right 1 Implanted   LUCIANO SEMI-CIRCULAR [id number] -- MARIZA 3 - SNA  LUCIANO SEMI-CIRCULAR [id number] -- MARIZA 3 NA [employer] [facility] NA Right 1 Implanted   [id number] [employer]PE[employer] PINS Pin  NA [employer]  Right 2 Implanted   LUCIANO TO LUCIANO COUPLING Other  NA [employer]  Right 6 Implanted   5 HOLE PIN CLAMP Clamp   NA [employer]  Right 1 Implanted

## 2018-03-03 NOTE — ANESTHESIA PREPROCEDURE EVALUATION
Anesthetic History     PONV          Review of Systems / Medical History  Patient summary reviewed and pertinent labs reviewed    Pulmonary  Within defined limits                 Neuro/Psych         Psychiatric history    Comments: Anxiety Cardiovascular    Hypertension              Exercise tolerance: >4 METS     GI/Hepatic/Renal         Renal disease: ARF      Comments: ? Patient does not know of any kidney problems in the past, but on her labwork her BUN and Creatinine are both elevated. Will recheck post op and have hospitalist follow up.  Endo/Other  Within defined limits           Other Findings   Comments: ? Memory loss  H/o alcohol abuse           Physical Exam    Airway  Mallampati: II  TM Distance: 4 - 6 cm  Neck ROM: normal range of motion   Mouth opening: Normal     Cardiovascular  Regular rate and rhythm,  S1 and S2 normal,  no murmur, click, rub, or gallop  Rhythm: regular  Rate: normal         Dental  No notable dental hx       Pulmonary  Breath sounds clear to auscultation               Abdominal  GI exam deferred       Other Findings            Anesthetic Plan    ASA: 3, emergent  Anesthesia type: general          Induction: Intravenous  Anesthetic plan and risks discussed with: Patient

## 2018-03-03 NOTE — PROGRESS NOTES
Primary Nurse Maxime Crisostomo RN and Shelli Chaudhari RN performed a dual skin assessment on this patient No impairment noted

## 2018-03-03 NOTE — PROGRESS NOTES
[facility]: MED RECONCILIATION    Comments/Recommendations:    Patient states she only takes gabapentin when she has foot pain/tingling. It was prescribed as 900 mg in the morning, 600 mg at lunch, and 900 mg at bedtime, however, when she takes it sheonly takes 900 mg once daily.  Patient was prescribed duloxetine 60 mg PO daily as a dose increase, but has not started it yet     Medications added:   · aspirin    Medications removed:  · disulfiram    Medications adjusted:  · Gabapentin to 900 mg PO daily  · Duloxetine to 20 mg    Information obtained from: Patient    Allergies: Sulfa (sulfonamide antibiotics)    Prior to Admission Medications:     Prior to Admission Medications   Prescriptions Last Dose Informant Patient Reported? Taking? DULoxetine (CYMBALTA) 20 mg capsule 3/2/2018 at AM Self Yes Yes   Sig: Take 20 mg by mouth daily. aspirin delayed-release 325 mg tablet 2/28/2018 Self Yes Yes   Sig: Take 325 mg by mouth every Monday, Wednesday, Friday. carvedilol (COREG) 12.5 mg tablet 3/2/2018 at AM Self Yes Yes   Sig: Take 12.5 mg by mouth two (2) times daily (with meals). gabapentin (NEURONTIN) 300 mg capsule  Self Yes Yes   Sig: Take 900 mg by mouth daily. hydroCHLOROthiazide (HYDRODIURIL) 25 mg tablet 3/2/2018 at AM Self Yes Yes   Sig: Take 25 mg by mouth daily. letrozole [facility]) 2.5 mg tablet 2/28/2018 Self Yes Yes   Sig: Take 2.5 mg by mouth daily. levothyroxine (SYNTHROID) 50 mcg tablet 3/2/2018 at AM Self Yes Yes   Sig: Take 50 mcg by mouth Daily (before breakfast). losartan (COZAAR) 50 mg tablet 3/2/2018 at AM Self Yes Yes   Sig: Take 50 mg by mouth daily. multivitamin, tx-iron-ca-min (THERA-M W/ IRON) 9 mg iron-400 mcg tab tablet 3/2/2018 at AM Self Yes Yes   Sig: Take 1 Tab by mouth daily. simvastatin (ZOCOR) 20 mg tablet 3/1/2018 Self Yes Yes   Sig: Take 20 mg by mouth nightly.    traZODone (DESYREL) 50 mg tablet 2/28/2018 Self Yes Yes   Sig: Take 50 mg by mouth nightly as needed for Sleep.        Thank you,  Latisha Courtney, PharmD, [facility]

## 2018-03-03 NOTE — PROGRESS NOTES
Physical Therapy:  Patient currently without physical therapy orders, weight bearing orders are the only orders in the chart. Please place an order for \"PT eval and treat\". Communicated with nursing.   Thank you  Kenzie Mensah PT,DPT,NCS

## 2018-03-04 LAB
ALBUMIN SERPL-MCNC: 2.8 G/DL (ref 3.5–5)
ALBUMIN/GLOB SERPL: 1 {RATIO} (ref 1.1–2.2)
ALP SERPL-CCNC: 55 U/L (ref 45–117)
ALT SERPL-CCNC: 13 U/L (ref 12–78)
ANION GAP SERPL CALC-SCNC: 9 MMOL/L (ref 5–15)
AST SERPL-CCNC: 23 U/L (ref 15–37)
BACTERIA SPEC CULT: ABNORMAL
BASOPHILS # BLD: 0 K/UL (ref 0–0.1)
BASOPHILS NFR BLD: 1 % (ref 0–1)
BILIRUB SERPL-MCNC: 0.2 MG/DL (ref 0.2–1)
BUN SERPL-MCNC: 16 MG/DL (ref 6–20)
BUN/CREAT SERPL: 18 (ref 12–20)
CALCIUM SERPL-MCNC: 8 MG/DL (ref 8.5–10.1)
CC UR VC: ABNORMAL
CHLORIDE SERPL-SCNC: 103 MMOL/L (ref 97–108)
CO2 SERPL-SCNC: 31 MMOL/L (ref 21–32)
CREAT SERPL-MCNC: 0.89 MG/DL (ref 0.55–1.02)
DIFFERENTIAL METHOD BLD: ABNORMAL
EOSINOPHIL # BLD: 0.1 K/UL (ref 0–0.4)
EOSINOPHIL NFR BLD: 2 % (ref 0–7)
ERYTHROCYTE [DISTWIDTH] IN BLOOD BY AUTOMATED COUNT: 13.8 % (ref 11.5–14.5)
GLOBULIN SER CALC-MCNC: 2.7 G/DL (ref 2–4)
GLUCOSE SERPL-MCNC: 128 MG/DL (ref 65–100)
HCT VFR BLD AUTO: 27.9 % (ref 35–47)
HGB BLD-MCNC: 9.4 G/DL (ref 11.5–16)
IMM GRANULOCYTES # BLD: 0 K/UL (ref 0–0.04)
IMM GRANULOCYTES NFR BLD AUTO: 0 % (ref 0–0.5)
LYMPHOCYTES # BLD: 1.4 K/UL (ref 0.8–3.5)
LYMPHOCYTES NFR BLD: 29 % (ref 12–49)
MCH RBC QN AUTO: 31.8 PG (ref 26–34)
MCHC RBC AUTO-ENTMCNC: 33.7 G/DL (ref 30–36.5)
MCV RBC AUTO: 94.3 FL (ref 80–99)
MONOCYTES # BLD: 0.4 K/UL (ref 0–1)
MONOCYTES NFR BLD: 9 % (ref 5–13)
NEUTS SEG # BLD: 2.8 K/UL (ref 1.8–8)
NEUTS SEG NFR BLD: 59 % (ref 32–75)
NRBC # BLD: 0 K/UL (ref 0–0.01)
NRBC BLD-RTO: 0 PER 100 WBC
PLATELET # BLD AUTO: 104 K/UL (ref 150–400)
PMV BLD AUTO: 9.9 FL (ref 8.9–12.9)
POTASSIUM SERPL-SCNC: 2.9 MMOL/L (ref 3.5–5.1)
PROT SERPL-MCNC: 5.5 G/DL (ref 6.4–8.2)
RBC # BLD AUTO: 2.96 M/UL (ref 3.8–5.2)
SERVICE CMNT-IMP: ABNORMAL
SODIUM SERPL-SCNC: 143 MMOL/L (ref 136–145)
WBC # BLD AUTO: 4.8 K/UL (ref 3.6–11)

## 2018-03-04 PROCEDURE — 85025 COMPLETE CBC W/AUTO DIFF WBC: CPT | Performed by: FAMILY MEDICINE

## 2018-03-04 PROCEDURE — 74011250636 HC RX REV CODE- 250/636: Performed by: INTERNAL MEDICINE

## 2018-03-04 PROCEDURE — 74011250636 HC RX REV CODE- 250/636: Performed by: ORTHOPAEDIC SURGERY

## 2018-03-04 PROCEDURE — 74011250637 HC RX REV CODE- 250/637: Performed by: ORTHOPAEDIC SURGERY

## 2018-03-04 PROCEDURE — 65270000029 HC RM PRIVATE

## 2018-03-04 PROCEDURE — 80053 COMPREHEN METABOLIC PANEL: CPT | Performed by: FAMILY MEDICINE

## 2018-03-04 PROCEDURE — 74011250637 HC RX REV CODE- 250/637: Performed by: FAMILY MEDICINE

## 2018-03-04 PROCEDURE — 97530 THERAPEUTIC ACTIVITIES: CPT

## 2018-03-04 PROCEDURE — 36415 COLL VENOUS BLD VENIPUNCTURE: CPT | Performed by: FAMILY MEDICINE

## 2018-03-04 RX ORDER — POTASSIUM CHLORIDE 750 MG/1
40 TABLET, FILM COATED, EXTENDED RELEASE ORAL 2 TIMES DAILY
Status: COMPLETED | OUTPATIENT
Start: 2018-03-04 | End: 2018-03-04

## 2018-03-04 RX ADMIN — HEPARIN SODIUM 5000 UNITS: 5000 INJECTION, SOLUTION INTRAVENOUS; SUBCUTANEOUS at 20:54

## 2018-03-04 RX ADMIN — LORAZEPAM 1 MG: 2 INJECTION INTRAMUSCULAR; INTRAVENOUS at 20:54

## 2018-03-04 RX ADMIN — SODIUM CHLORIDE AND POTASSIUM CHLORIDE: 9; 2.98 INJECTION, SOLUTION INTRAVENOUS at 17:11

## 2018-03-04 RX ADMIN — POTASSIUM CHLORIDE 40 MEQ: 750 TABLET, EXTENDED RELEASE ORAL at 17:11

## 2018-03-04 RX ADMIN — OXYCODONE HYDROCHLORIDE AND ACETAMINOPHEN 1 TABLET: 7.5; 325 TABLET ORAL at 22:59

## 2018-03-04 RX ADMIN — OXYCODONE HYDROCHLORIDE AND ACETAMINOPHEN 1 TABLET: 7.5; 325 TABLET ORAL at 12:33

## 2018-03-04 RX ADMIN — CARVEDILOL 12.5 MG: 12.5 TABLET, FILM COATED ORAL at 07:57

## 2018-03-04 RX ADMIN — HEPARIN SODIUM 5000 UNITS: 5000 INJECTION, SOLUTION INTRAVENOUS; SUBCUTANEOUS at 14:53

## 2018-03-04 RX ADMIN — DULOXETINE HYDROCHLORIDE 20 MG: 20 CAPSULE, DELAYED RELEASE ORAL at 08:56

## 2018-03-04 RX ADMIN — Medication 2 G: at 03:26

## 2018-03-04 RX ADMIN — SIMVASTATIN 20 MG: 20 TABLET, FILM COATED ORAL at 17:11

## 2018-03-04 RX ADMIN — PROCHLORPERAZINE EDISYLATE 5 MG: 5 INJECTION INTRAMUSCULAR; INTRAVENOUS at 23:00

## 2018-03-04 RX ADMIN — Medication 10 ML: at 23:00

## 2018-03-04 RX ADMIN — Medication 100 MG: at 08:36

## 2018-03-04 RX ADMIN — Medication 2 G: at 11:20

## 2018-03-04 RX ADMIN — CARVEDILOL 12.5 MG: 12.5 TABLET, FILM COATED ORAL at 17:11

## 2018-03-04 RX ADMIN — Medication 10 ML: at 14:53

## 2018-03-04 RX ADMIN — POTASSIUM CHLORIDE 40 MEQ: 750 TABLET, EXTENDED RELEASE ORAL at 11:20

## 2018-03-04 RX ADMIN — HEPARIN SODIUM 5000 UNITS: 5000 INJECTION, SOLUTION INTRAVENOUS; SUBCUTANEOUS at 06:01

## 2018-03-04 RX ADMIN — FOLIC ACID 1 MG: 1 TABLET ORAL at 08:37

## 2018-03-04 RX ADMIN — Medication 2 G: at 20:02

## 2018-03-04 RX ADMIN — SODIUM CHLORIDE AND POTASSIUM CHLORIDE: 9; 2.98 INJECTION, SOLUTION INTRAVENOUS at 03:42

## 2018-03-04 RX ADMIN — LEVOTHYROXINE SODIUM 50 MCG: 50 TABLET ORAL at 06:01

## 2018-03-04 RX ADMIN — OXYCODONE HYDROCHLORIDE AND ACETAMINOPHEN 1 TABLET: 7.5; 325 TABLET ORAL at 19:03

## 2018-03-04 RX ADMIN — Medication 10 ML: at 06:02

## 2018-03-04 RX ADMIN — LORAZEPAM 1 MG: 2 INJECTION INTRAMUSCULAR; INTRAVENOUS at 14:53

## 2018-03-04 RX ADMIN — Medication 10 ML: at 20:57

## 2018-03-04 NOTE — PROGRESS NOTES
Problem: Mobility Impaired (Adult and Pediatric)  Goal: *Acute Goals and Plan of Care (Insert Text)  Physical Therapy Goals  Initiated 3/3/2018  1. Patient will move from supine to sit and sit to supine  in bed with supervision within 7 day(s). 2.  Patient will transfer from bed to chair and chair to bed with minimal assistance using the least restrictive device within 7 day(s). 3.  Patient will perform sit to stand with minimal assistance within 7 day(s). 4.  Patient will ambulate with minimal assistance for 25 feet with the least restrictive device within 7 day(s). physical Therapy TREATMENT  Patient: Junito Murray (24 y.o. female)  Date: 3/4/2018  Diagnosis: RIGHT ANKLE FRACTURE  Ankle fracture, right Ankle fracture, right  Procedure(s) (LRB):  ANKLE OPEN REDUCTION INTERNAL FIXATION (Right) 2 Days Post-Op  Precautions: NWB (RLE transfers only)  Chart, physical therapy assessment, plan of care and goals were reviewed. ASSESSMENT:  Nursing contacted PT to assist patient back to bed from [facility]. Patient able to sit up in [facility] with LE propped x 1 hour. Per nursing (nurse +tech) attempted [facility] transfer however unsuccessful. PT asking patient if she would like to try again and she became emotional stating \"If I have to do what my doctor says then I will\". WC to commode transfer with slideboard max A x 1 and SBA x 1 for WC stabilize, and nursing assisting the R LE. Patient fearful, anxious, tearful 1/4 away across board and stopped. Returned back to [facility]. Due to emotional liability,fear, and with concerns for safety PT performed squat pivot max A to commode. Squat pivot from commode to bed max A x 1, SBA for R LE to keep un weighted off ground,. Patient placed supine in bed with leg propped on several pillows. Patient very emotional at this point. Length conversation post treatment with spouse regarding patient's long standing ETOH addiction and frequent falls and with failed drug rehab.   Patient will continue to benefit from use of bed pan with nursing due to fear with transfers and perform SB transfers with PT. Patient will need rehab    Progression toward goals:  []    Improving appropriately and progressing toward goals  [x]    Improving slowly and progressing toward goals  []    Not making progress toward goals and plan of care will be adjusted     PLAN:  Patient continues to benefit from skilled intervention to address the above impairments. Continue treatment per established plan of care. Discharge Recommendations:  Rehab  Further Equipment Recommendations for Discharge:  tbd     SUBJECTIVE:   Patient stated This is so humiliating and I am so tired of all this.     OBJECTIVE DATA SUMMARY:   Critical Behavior:  Neurologic State: Alert, Confused, Restless  Orientation Level: Oriented to person  Cognition: Impaired decision making     Functional Mobility Training:  Bed Mobility:     Supine to Sit: Contact guard assistance              Transfers:              Bed to Chair: Maximum assistance;Assist x1        Sliding Board : Assist x1        Level of Assistance: Maximum assistance  Balance:  Sitting: Intact; High guard  Ambulation/Gait Training:unable due to safety                                                       Stairs:did not occur            Pain:                    Activity Tolerance:   See above  Please refer to the flowsheet for vital signs taken during this treatment.   After treatment:   []    Patient left in no apparent distress sitting up in chair  [x]    Patient left in no apparent distress in bed  []    Call bell left within reach  [x]    Nursing notified  []    Caregiver present  [x]    Bed alarm activated    COMMUNICATION/COLLABORATION:   The patients plan of care was discussed with: Registered Nurse    Luis Partida DPT   Time Calculation: 18 mins

## 2018-03-04 NOTE — PROGRESS NOTES
Dr Camelia Portillo notified that patient is getting irritable,.confused and agitated despite administration of Ativan and Percocet. Patient placed on CIWA protocol.

## 2018-03-04 NOTE — PROGRESS NOTES
Family Practice Consult and Daily Progress Note: 3/3/2018  Sharron Merida MD    Assessment/Plan:   ANDERSON (acute kidney injury) [facility]) (3/3/2018) vrs some degree of CKD - pending office review of labs: likely due to volume depletion. Had vomiting, diuretic use. Hold Hydrodiuril and ARB. Start IVFs and follow renal function     Hypokalemia due to loss of potassium (3/3/2018): from diuretic use. DC hydrodiuril. Replete and follow K+     HTN (hypertension), benign (3/3/2018): Continue Coreg. Hold Losartan due to Anderson     Depression with anxiety (3/3/2018): resume Cymbalta     Dyslipidemia (3/3/2018): resume simvastatin      Ankle fracture, right (3/2/2018): ortho is attending.  Per ortho    Hx of ETOH abuse - although says she has been sober - consider CIWA protocol if any sign of ETOH withdrawal.         Problem List:  Problem List as of 3/3/2018  Date Reviewed: 8/24/2016          Codes Class Noted - Resolved    ANDERSON (acute kidney injury) ([facility] [address] ICD-10-CM: N17.9  ICD-9-CM: 584.9  3/3/2018 - Present        Hypokalemia due to loss of potassium ICD-10-CM: E87.6  ICD-9-CM: 276.8  3/3/2018 - Present        HTN (hypertension), benign ICD-10-CM: I10  ICD-9-CM: 401.1  3/3/2018 - Present        Depression with anxiety ICD-10-CM: F41.8  ICD-9-CM: 300.4  3/3/2018 - Present        Dyslipidemia ICD-10-CM: E78.5  ICD-9-CM: 272.4  3/3/2018 - Present        * (Principal)Ankle fracture, right ICD-10-CM: P20.175J  ICD-9-CM: 824.8  3/2/2018 - Present        Numbness and tingling of both legs below knees ICD-10-CM: R20.0, R20.2  ICD-9-CM: 782.0  11/30/2015 - Present        Numbness in feet ICD-10-CM: R20.0  ICD-9-CM: 782.0  10/23/2015 - Present        Burning sensation of feet ICD-10-CM: R20.8  ICD-9-CM: 782.0  10/23/2015 - Present        SAH (subarachnoid hemorrhage) (HCC) ICD-10-CM: I60.9  ICD-9-CM: 073  10/23/2015 - Present              Subjective:    62 yo WF, pt Dr Jessica Marshall in our group, admitted after fall and severe fx of right ankle requiring 2 stage surg repair, first repair done 3/2/18. Pt reports no syncope or dizziness prior to fall and no prodrome events. She did walk on the foot for 2 days post fall and had little pain due to her chronic LE neuropathy. We are asked to see her for her medical problems including elevated creat. She does report marked decrease in intake for 2 days since her fall. She has a hx of HTN which has been relatively well controlled outpt. She also has a hx hypothyroidism, LE neuropathy, depression with anxiety and hypothyroidism and reports she is compliant with her medications. In addition, she reports Alcoholism and reports she has been sober for [place] couple of months. \"  She is going to call her sponsor today as she is feeling additional stress from the fall and surg. Currently she is stable for follow up surg. Past Medical History:   Diagnosis Date    Alcohol abuse     Anxiety     Depression     Fatigue     Hypertension     Memory loss     Vertigo     Visual disturbance      Past Surgical History:   Procedure Laterality Date    HX ANKLE FRACTURE TX Right 12/2016    HX APPENDECTOMY      HX ORTHOPAEDIC      right knee repair    HX OTHER SURGICAL  04/2016    double mastectomy     HX OTHER SURGICAL  06/2016    reconstructive surgery on her breasts    HX SHOULDER REPLACEMENT Right 11/2016    HX WRIST FRACTURE TX Left 12/2016     Social History     Social History    Marital status:      Spouse name: N/A    Number of children: N/A    Years of education: N/A     Occupational History    Not on file.      Social History Main Topics    Smoking status: Never Smoker    Smokeless tobacco: Never Used    Alcohol use 21.0 oz/week     35 Glasses of wine per week      Comment: last drink was 2/27    Drug use: Not on file    Sexual activity: Not on file     Other Topics Concern    Not on file     Social History Narrative     Family History   Problem Relation Age of Onset    Cancer Mother     Other Father      Allergies   Allergen Reactions    Sulfa (Sulfonamide Antibiotics) Swelling and Other (comments)     Flushing and hot feeling   Facial swelling       Review of Systems:     Constitutional ROS: no fever, chills, rigors or night sweats; + for fatigue  Respiratory ROS: no cough, sputum, hemoptysis, dyspnea or pleuritic pain. Cardiovascular ROS: no chest pain, palpitation, orthopnea, PND or syncope  Endocrine ROS: no polydispsia, polyuria, heat or cold intolerance   Gastrointestinal ROS: no dysphagia, abdominal pain, nausea, vomiting, diarrhea or bleeding. Genito-Urinary ROS: no dysuria, frequency, hematuria, retention, or flank pain  Musculoskeletal ROS: right ankle pain. Neurological ROS: no headache, confusion, diplopia, focal symptoms, tremor, or hallucinosis. LE neuropathy unchanged  Psychiatric ROS: ROS: feeling mod depression vrs sadness due to injury/fall she reports, no current anxiety, mood swings  Dermatological ROS: no rash, pruritis, or urticaria      Objective:   Physical Exam:     Visit Vitals    /81 (BP 1 Location: Right arm, BP Patient Position: Head of bed elevated (Comment degrees))    Pulse (!) 104    Temp 99.4 °F (37.4 °C)    Resp 19    Ht 5' 7\" (1.702 m)    Wt 70.3 kg (155 lb)    SpO2 93%    Breastfeeding No    BMI 24.28 kg/m2    O2 Flow Rate (L/min): 2 l/min O2 Device: Room air    Temp (24hrs), Av.5 °F (36.9 °C), Min:97.9 °F (36.6 °C), Max:99.4 °F (37.4 °C)        701 - 1900  In:  [I.V.:0]  Out: 75 [Urine:75]    General:  Alert, cooperative, no distress, appears stated age. Head:  Normocephalic, without obvious abnormality, atraumatic. Eyes:  Conjunctivae/corneas clear. PERRL, EOMs intact. Nose: Nares normal. Septum midline. Mucosa normal. No drainage or sinus tenderness.    Throat: Lips, mucosa, and tongue moist..   Neck: Supple, symmetrical, trachea midline, no adenopathy, thyroid: no enlargement/tenderness/nodules, no carotid bruit and no JVD. Back:   Symmetric, no curvature. ROM normal. No CVA tenderness. Lungs:   Clear to auscultation bilaterally. Chest wall:  No tenderness or deformity. Heart:  Regular rate and rhythm, S1, S2 normal, no murmur, click, rub or gallop. Abdomen:   Soft, non-tender. Bowel sounds normal. No masses,  No organomegaly. Extremities: no cyanosis or edema. No calf tenderness or cords left. Right LE in splint and metal frame/external fixation; moves toes to command. Feet warm     Pulses: 2+ and symmetric all extremities. Skin: Skin color, texture, turgor normal. No rashes or lesions   Neurologic: CNII-XII intact. Alert and oriented X 3. Fine motor of hands and fingers normal.  No tremor.  equal.  No cogwheeling or rigidity. Gait not tested at this time. Sensation grossly normal to touch except LEs. Gross motor of extremities otherwise normal.       Data Review:   EXAM:  XR TIB/FIB RT, XR ANKLE RT MIN 3 V 3/2/18  INDICATION:  fx.  Right ankle deformity after a fall  COMPARISON: None. FINDINGS: AP and lateral  views of the right tibia and fibula. 3 views of the  right ankle. The patient is status post ORIF of the patella. There is mild medial compartment  osteophytosis in the knee. There is chronic deformity of the fibular neck. There  is a displaced fracture of the medial malleolus. The medial malleolus is 1.5 cm  from the distal tibia. There is a comminuted displaced fracture of the lateral  malleolus at the level of the tibiotalar joint. The talus and distal lateral  malleolus are dislocated laterally by 2.8 cm and slightly retracted.   IMPRESSION: Bimalleolar ankle fracture dislocation      Recent Days:  Recent Labs      03/02/18   1856   WBC  10.7   HGB  12.5   HCT  36.4   PLT  155     Recent Labs      03/02/18   1856   NA  134*   K  3.1*   CL  93*   CO2  26   GLU  119*   BUN  42*   CREA  3.16*   CA  9.1   ALB  4.1   TBILI  0.5   SGOT  35 ALT  27   INR  1.1     No results for input(s): PH, PCO2, PO2, HCO3, FIO2 in the last 72 hours. 24 Hour Results:  No results found for this or any previous visit (from the past 24 hour(s)). Medications reviewed  Current Facility-Administered Medications   Medication Dose Route Frequency    0.9% sodium chloride with KCl 40 mEq/L infusion   IntraVENous CONTINUOUS    prochlorperazine (COMPAZINE) tablet 10 mg  10 mg Oral Q6H PRN    therapeutic multivitamin (THERAGRAN) tablet 1 Tab  1 Tab Oral DAILY    scopolamine (TRANSDERM-SCOP) 1 mg over 3 days 1 Patch  1 Patch TransDERmal Q72H    carvedilol (COREG) tablet 12.5 mg  12.5 mg Oral BID WITH MEALS    DULoxetine (CYMBALTA) capsule 20 mg  20 mg Oral DAILY    levothyroxine (SYNTHROID) tablet 50 mcg  50 mcg Oral ACB    simvastatin (ZOCOR) tablet 20 mg  20 mg Oral DAILY    sodium chloride (NS) flush 5-10 mL  5-10 mL IntraVENous Q8H    sodium chloride (NS) flush 5-10 mL  5-10 mL IntraVENous PRN    ceFAZolin (ANCEF) 2 g/20 mL in sterile water IV syringe  2 g IntraVENous Q8H    heparin (porcine) injection 5,000 Units  5,000 Units SubCUTAneous Q8H    acetaminophen (TYLENOL) tablet 650 mg  650 mg Oral Q4H PRN    oxyCODONE-acetaminophen (PERCOCET 7.5) 7.5-325 mg per tablet 1 Tab  1 Tab Oral Q4H PRN    HYDROmorphone (PF) (DILAUDID) injection 1 mg  1 mg IntraVENous Q4H PRN    naloxone (NARCAN) injection 0.4 mg  0.4 mg IntraVENous PRN    folic acid (FOLVITE) tablet 1 mg  1 mg Oral DAILY    Thiamine Mononitrate (B-1) tablet 100 mg  100 mg Oral DAILY    LORazepam (ATIVAN) injection 1 mg  1 mg IntraVENous Q6H PRN    prochlorperazine (COMPAZINE) injection 5 mg  5 mg IntraVENous Q6H PRN       Care Plan discussed with: Patient/Family and Nurse    Total time spent with patient: 30 minutes.     Denver Ralphs, MD

## 2018-03-04 NOTE — PROGRESS NOTES
3/4/2018  4:04 PM  CM assessment not currently appropriate due to pt detoxing. CM will reassess at a more appropriate time.

## 2018-03-04 NOTE — PROGRESS NOTES
Problem: Mobility Impaired (Adult and Pediatric)  Goal: *Acute Goals and Plan of Care (Insert Text)  Physical Therapy Goals  Initiated 3/3/2018  1. Patient will move from supine to sit and sit to supine  in bed with supervision within 7 day(s). 2.  Patient will transfer from bed to chair and chair to bed with minimal assistance using the least restrictive device within 7 day(s). 3.  Patient will perform sit to stand with minimal assistance within 7 day(s). 4.  Patient will ambulate with minimal assistance for 25 feet with the least restrictive device within 7 day(s). physical Therapy TREATMENT  Patient: Diego Pyle (25 y.o. female)  Date: 3/4/2018  Diagnosis: RIGHT ANKLE FRACTURE  Ankle fracture, right Ankle fracture, right  Procedure(s) (LRB):  ANKLE OPEN REDUCTION INTERNAL FIXATION (Right) 2 Days Post-Op  Precautions: NWB (RLE transfers only)  Chart, physical therapy assessment, plan of care and goals were reviewed. ASSESSMENT:  Patient agreeing to PT and transfer to chair. Patient c/o itching at fixator sites. Spouse and sister present. Patient stating she has been using the bathroom. PT clarified and stated she used [facility] yesterday with PT however patient adamant she was up and using the bathroom before that. Spouse also disagreeing and patient becoming mildly upset. PT brought in [facility] to use for transfer. Patient performed supine to sit CGA. R trunk lean to place SB. Patient with mod A x 1; SBA x 1 SB transfer towards L NWB R LE. Patient with good quad and holding R LE up off the floor. Patient positioned in [facility] with R LE elevated. Patient very tearful at this point and stating \"I cant do this at home\". Patient requesting pain medication and nursing aware of patient position, request for pain med and progress with PT.  Nursing to contact PT for going back to bed. Asked patient to sit for 30-60 minutes today.   Patient will most likely need rehab at discharge    Progression toward goals:  [] Improving appropriately and progressing toward goals  [x]    Improving slowly and progressing toward goals  []    Not making progress toward goals and plan of care will be adjusted     PLAN:  Patient continues to benefit from skilled intervention to address the above impairments. Continue treatment per established plan of care. Discharge Recommendations:  Rehab  Further Equipment Recommendations for Discharge:  May need WC, SB     SUBJECTIVE:   Patient stated My foot is itching like crazy    OBJECTIVE DATA SUMMARY:   Critical Behavior:  Neurologic State: Alert, Confused, Restless  Orientation Level: Oriented to person  Cognition: Impaired decision making     Functional Mobility Training:  Bed Mobility:     Supine to Sit: Contact guard assistance              Transfers:                       Sliding Board : Assist x2        Level of Assistance: Moderate assistance  Balance:  Sitting: Intact  Ambulation/Gait Training: Transfers only                                                        Stairs:did not occur transfers only              Pain:                    Activity Tolerance:   See above  Please refer to the flowsheet for vital signs taken during this treatment.   After treatment:   [x]    Patient left in no apparent distress sitting up in wheelchair  []    Patient left in no apparent distress in bed  [x]    Call bell left within reach  []    Nursing notified  []    Caregiver present  [x]    chair alarm activated    COMMUNICATION/COLLABORATION:   The patients plan of care was discussed with: Registered Nurse    Bhakti Anand DPT   Time Calculation: 15 mins

## 2018-03-04 NOTE — PROGRESS NOTES
Patient sleep most of the night, needed to be redirected not get of bed 3x. Patient required 1.5L NC, patient desated to the 80s when sleeping. Patient accepted redirection and remained in. Around 9999 patient pulled IV line, will attempt new line.

## 2018-03-04 NOTE — PROGRESS NOTES
Orthopaedic Progress Note  Post Op Day: 2 Day Post-Op    2018 11:10 AM     Patient: Jayne Quinteros MRN: [id number]  SSN: [id number]    YOB: 1955  Age: 61 y.o. Sex: female      Admit date:  3/2/2018  Date of Surgery:  3/2/2018   Procedures:  Procedure(s):  Right ankle wound I&D with spanning external fixation  Admitting Physician:  Arnoldo Puckett MD   Surgeon:  Poonam Lemos) and Role:     * Arnoldo Puckett MD - Primary    Consulting Physician(s): Treatment Team: Attending Provider: Arnoldo Puckett MD; Consulting Provider: Arnoldo Puckett MD; Physician: Jus Turcios MD; Utilization Review: Vladislav Million    SUBJECTIVE:     Jayne Quinteros is a 61 y.o. female is 2 Day Post-Op s/p Procedure(s):    Pain controlled. No new issues overnight. No evidence of DTs.  at bedside. R ankle CT reviewed. Acute renal failure resolved. OBJECTIVE:       Physical Exam:  General: Alert, cooperative, no distress. Respiratory: Respirations unlabored  Neurological:  Neurovascular exam within normal limits. Motor: + DF/PF. Musculoskeletal: Calves soft, supple, non-tender upon palpation. Dressing/Wound:  RLE with ex fix intact, Normal ankle alignment; Intact TA, EHL, GS; full sensation. Pulses 2+ DP/PT Dressing changed. Pin sites and wound look good. Expected ecchymosis and mild swelling. No cellulitis. Pin sites painted with betadine. New xeroform placed around pin sites.     Vital Signs:        Patient Vitals for the past 8 hrs:   BP Temp Pulse Resp SpO2   18 0812 149/90 98.5 °F (36.9 °C) (!) 103 16 95 %   18 0353 145/83 98.6 °F (37 °C) (!) 109 18 96 %                                          Temp (24hrs), Av.8 °F (37.1 °C), Min:98.5 °F (36.9 °C), Max:99.4 °F (37.4 °C)      Labs:        Recent Labs      18   0334  18   1856   HCT  27.9*  36.4   HGB  9.4*  12.5   INR   --   1.1     Lab Results   Component Value Date/Time    Sodium 143 03/04/2018 03:34 AM    Potassium 2.9 (L) 03/04/2018 03:34 AM    Chloride 103 03/04/2018 03:34 AM    CO2 31 03/04/2018 03:34 AM    Glucose 128 (H) 03/04/2018 03:34 AM    BUN 16 03/04/2018 03:34 AM    Creatinine 0.89 03/04/2018 03:34 AM    Calcium 8.0 (L) 03/04/2018 03:34 AM       PT/OT:                Patient mobility  Bed Mobility Training  Rolling: Stand-by assistance  Supine to Sit: Minimum assistance  Sit to Supine: Minimum assistance  Transfer Training  Sliding Board : Moderate assistance                   ASSESSMENT / PLAN:   Principal Problem:    Ankle fracture, right (3/2/2018)    Active Problems:    MARYJO (acute kidney injury) ([address] (3/3/2018)      Hypokalemia due to loss of potassium (3/3/2018)      HTN (hypertension), benign (3/3/2018)      Depression with anxiety (3/3/2018)      Dyslipidemia (3/3/2018)         Open right bimalleolar ankle fracture/ dislocation; POD #2 s/p right ankle I&D/spanning external fixation.    - IV abx x 48 hours to be completed today   - PT/OT; transfers only. NWB RLE.              - Will need SNF placement at [facility]              - F/U with Dr. Hoang Siegel in next 5-7 days for definitive surgical treatment/ORIF        Pain Control: Adequate with oral agents and PRN IV narcotics    DVT Prophylaxis: Heparin 5000 SQ Q8 hours   Therapy/ Weight bearing: NWB RLE;  PT for transfers into chair. Continue elevation above heart level. Dressing Dressing changed. Pin site and medial ankle wound look good. Will need q48 hour xeroform dressing change at SNF. Medical concerns: Alcohol withdraw: CIWA protocol. Disposition: SNF placement [facility], hopefully tomorrow. Will need transport to Dr. Anabell Brennan office within next 5-7 days for skin/wound check. Can call [phone] for appt details. Definitive ORIF within next 10-14 days with Dr. Eleuterio Naylor.      Signed By:  MD Mainor RodriguezWaltonia

## 2018-03-04 NOTE — PROGRESS NOTES
Family Practice Consult and Daily Progress Note: 3/4/2018  Anastasiya Xavier MD    Assessment/Plan:   MARYJO (acute kidney injury) [facility]) (3/3/2018) vrs some degree of CKD -  likely due to volume depletion. Had vomiting, diuretic use. Holding Hydrodiuril and ARB. Start IVFs and follow renal function     Hypokalemia due to loss of potassium (3/3/2018): from diuretic use. DC hydrodiuril. Replete and follow K+     HTN (hypertension), benign (3/3/2018): Continue Coreg. Hold Losartan due to MARYJO     Depression with anxiety (3/3/2018): resume Cymbalta     Dyslipidemia (3/3/2018): resume simvastatin      Ankle fracture, right (3/2/2018): ortho is attending.  Per ortho    Hx of ETOH abuse - Had to start CIWA protocol yesterday evening- she reports her last drink was Monday        Problem List:  Problem List as of 3/4/2018  Date Reviewed: 8/24/2016          Codes Class Noted - Resolved    MARYJO (acute kidney injury) ([facility] [address] ICD-10-CM: N17.9  ICD-9-CM: 584.9  3/3/2018 - Present        Hypokalemia due to loss of potassium ICD-10-CM: E87.6  ICD-9-CM: 276.8  3/3/2018 - Present        HTN (hypertension), benign ICD-10-CM: I10  ICD-9-CM: 401.1  3/3/2018 - Present        Depression with anxiety ICD-10-CM: F41.8  ICD-9-CM: 300.4  3/3/2018 - Present        Dyslipidemia ICD-10-CM: E78.5  ICD-9-CM: 272.4  3/3/2018 - Present        * (Principal)Ankle fracture, right ICD-10-CM: N90.423O  ICD-9-CM: 824.8  3/2/2018 - Present        Numbness and tingling of both legs below knees ICD-10-CM: R20.0, R20.2  ICD-9-CM: 782.0  11/30/2015 - Present        Numbness in feet ICD-10-CM: R20.0  ICD-9-CM: 782.0  10/23/2015 - Present        Burning sensation of feet ICD-10-CM: R20.8  ICD-9-CM: 782.0  10/23/2015 - Present        SAH (subarachnoid hemorrhage) (HCC) ICD-10-CM: I60.9  ICD-9-CM: 171  10/23/2015 - Present              Subjective:    62 yo WF, pt Dr Penny Emerson in our group, admitted after fall and severe fx of right ankle requiring 2 stage surg repair, first repair done 3/2/18. Pt reports no syncope or dizziness prior to fall and no prodrome events. She did walk on the foot for 2 days post fall and had little pain due to her chronic LE neuropathy. We are asked to see her for her medical problems including elevated creat. She does report marked decrease in intake for 2 days since her fall. She has a hx of HTN which has been relatively well controlled outpt. She also has a hx hypothyroidism, LE neuropathy, depression with anxiety and hypothyroidism and reports she is compliant with her medications. In addition, she reports Alcoholism and reports she has been sober for Deidra Bridges couple of months. \"  She is going to call her sponsor today as she is feeling additional stress from the fall and surg. Currently she is stable for follow up surg. 3/4: Had more agitation and confusion last night and had to start CIWA protocol. She is better this am but still having problems with word retrieval. She reports to me that her last drink was Monday but told Dr Wandy Terry it was months ago. Potassium still low at 2.9. Will replete with po. Past Medical History:   Diagnosis Date    Alcohol abuse     Anxiety     Depression     Fatigue     Hypertension     Memory loss     Vertigo     Visual disturbance      Past Surgical History:   Procedure Laterality Date    HX ANKLE FRACTURE TX Right 12/2016    HX APPENDECTOMY      HX ORTHOPAEDIC      right knee repair    HX OTHER SURGICAL  04/2016    double mastectomy     HX OTHER SURGICAL  06/2016    reconstructive surgery on her breasts    HX SHOULDER REPLACEMENT Right 11/2016    HX WRIST FRACTURE TX Left 12/2016     Social History     Social History    Marital status:      Spouse name: N/A    Number of children: N/A    Years of education: N/A     Occupational History    Not on file.      Social History Main Topics    Smoking status: Never Smoker    Smokeless tobacco: Never Used    Alcohol use 21.0 oz/week     35 Glasses of wine per week      Comment: last drink was     Drug use: Not on file    Sexual activity: Not on file     Other Topics Concern    Not on file     Social History Narrative     Family History   Problem Relation Age of Onset    Cancer Mother     Other Father      Allergies   Allergen Reactions    Sulfa (Sulfonamide Antibiotics) Swelling and Other (comments)     Flushing and hot feeling   Facial swelling       Review of Systems:     Constitutional ROS: no fever, chills, rigors or night sweats; + for fatigue  Respiratory ROS: no cough, sputum, hemoptysis, dyspnea or pleuritic pain. Cardiovascular ROS: no chest pain, palpitation, orthopnea, PND or syncope  Endocrine ROS: no polydispsia, polyuria, heat or cold intolerance   Gastrointestinal ROS: no dysphagia, abdominal pain, nausea, vomiting, diarrhea or bleeding. Genito-Urinary ROS: no dysuria, frequency, hematuria, retention, or flank pain  Musculoskeletal ROS: right ankle pain. Neurological ROS: no headache, confusion, diplopia, focal symptoms, tremor, or hallucinosis. LE neuropathy unchanged  Psychiatric ROS: ROS: feeling mod depression vrs sadness due to injury/fall she reports, no current anxiety, mood swings  Dermatological ROS: no rash, pruritis, or urticaria      Objective:   Physical Exam:     Visit Vitals    /90 (BP 1 Location: Right arm, BP Patient Position: At rest)    Pulse (!) 103    Temp 98.5 °F (36.9 °C)    Resp 16    Ht 5' 7\" (1.702 m)    Wt 155 lb (70.3 kg)    SpO2 95%    Breastfeeding No    BMI 24.28 kg/m2    O2 Flow Rate (L/min): 1.5 l/min O2 Device: Nasal cannula    Temp (24hrs), Av.8 °F (37.1 °C), Min:98.5 °F (36.9 °C), Max:99.4 °F (37.4 °C)         1901 -  0700  In: 4741.7 [I.V.:4741.7]  Out: 75 [Urine:75]    General:  Alert, cooperative, no distress, appears stated age. Head:  Normocephalic, without obvious abnormality, atraumatic. Eyes:  Conjunctivae/corneas clear. PERRL, EOMs intact. Nose: Nares normal. Septum midline. Mucosa normal. No drainage or sinus tenderness. Throat: Lips, mucosa, and tongue moist..   Neck: Supple, symmetrical, trachea midline, no adenopathy, thyroid: no enlargement/tenderness/nodules, no carotid bruit and no JVD. Back:   Symmetric, no curvature. ROM normal. No CVA tenderness. Lungs:   Clear to auscultation bilaterally. Chest wall:  No tenderness or deformity. Heart:  Regular rate and rhythm, S1, S2 normal, no murmur, click, rub or gallop. Abdomen:   Soft, non-tender. Bowel sounds normal. No masses,  No organomegaly. Extremities: no cyanosis or edema. No calf tenderness or cords left. Right LE in splint and metal frame/external fixation; moves toes to command. Feet warm     Pulses: 2+ and symmetric all extremities. Skin: Skin color, texture, turgor normal. No rashes or lesions   Neurologic: CNII-XII intact. Alert and oriented X 3. Fine motor of hands and fingers normal.  No tremor.  equal.  No cogwheeling or rigidity. Gait not tested at this time. Sensation grossly normal to touch except LEs. Gross motor of extremities otherwise normal.       Data Review:   EXAM:  XR TIB/FIB RT, XR ANKLE RT MIN 3 V 3/2/18  INDICATION:  fx.  Right ankle deformity after a fall  COMPARISON: None. FINDINGS: AP and lateral  views of the right tibia and fibula. 3 views of the  right ankle. The patient is status post ORIF of the patella. There is mild medial compartment  osteophytosis in the knee. There is chronic deformity of the fibular neck. There  is a displaced fracture of the medial malleolus. The medial malleolus is 1.5 cm  from the distal tibia. There is a comminuted displaced fracture of the lateral  malleolus at the level of the tibiotalar joint. The talus and distal lateral  malleolus are dislocated laterally by 2.8 cm and slightly retracted.   IMPRESSION: Bimalleolar ankle fracture dislocation      Recent Days:  Recent Labs 03/04/18 0334 03/02/18   1856   WBC  4.8  10.7   HGB  9.4*  12.5   HCT  27.9*  36.4   PLT  104*  155     Recent Labs      03/04/18 0334 03/02/18 1856   NA  143  134*   K  2.9*  3.1*   CL  103  93*   CO2  31  26   GLU  128*  119*   BUN  16  42*   CREA  0.89  3.16*   CA  8.0*  9.1   ALB  2.8*  4.1   TBILI  0.2  0.5   SGOT  23  35   ALT  13  27   INR   --   1.1     No results for input(s): PH, PCO2, PO2, HCO3, FIO2 in the last 72 hours. 24 Hour Results:  Recent Results (from the past 24 hour(s))   CBC WITH AUTOMATED DIFF    Collection Time: 03/04/18  3:34 AM   Result Value Ref Range    WBC 4.8 3.6 - 11.0 K/uL    RBC 2.96 (L) 3.80 - 5.20 M/uL    HGB 9.4 (L) 11.5 - 16.0 g/dL    HCT 27.9 (L) 35.0 - 47.0 %    MCV 94.3 80.0 - 99.0 FL    MCH 31.8 26.0 - 34.0 PG    MCHC 33.7 30.0 - 36.5 g/dL    RDW 13.8 11.5 - 14.5 %    PLATELET 239 (L) 311 - 400 K/uL    MPV 9.9 8.9 - 12.9 FL    NRBC 0.0 0  WBC    ABSOLUTE NRBC 0.00 0.00 - 0.01 K/uL    NEUTROPHILS 59 32 - 75 %    LYMPHOCYTES 29 12 - 49 %    MONOCYTES 9 5 - 13 %    EOSINOPHILS 2 0 - 7 %    BASOPHILS 1 0 - 1 %    IMMATURE GRANULOCYTES 0 0.0 - 0.5 %    ABS. NEUTROPHILS 2.8 1.8 - 8.0 K/UL    ABS. LYMPHOCYTES 1.4 0.8 - 3.5 K/UL    ABS. MONOCYTES 0.4 0.0 - 1.0 K/UL    ABS. EOSINOPHILS 0.1 0.0 - 0.4 K/UL    ABS. BASOPHILS 0.0 0.0 - 0.1 K/UL    ABS. IMM.  GRANS. 0.0 0.00 - 0.04 K/UL    DF AUTOMATED     METABOLIC PANEL, COMPREHENSIVE    Collection Time: 03/04/18  3:34 AM   Result Value Ref Range    Sodium 143 136 - 145 mmol/L    Potassium 2.9 (L) 3.5 - 5.1 mmol/L    Chloride 103 97 - 108 mmol/L    CO2 31 21 - 32 mmol/L    Anion gap 9 5 - 15 mmol/L    Glucose 128 (H) 65 - 100 mg/dL    BUN 16 6 - 20 MG/DL    Creatinine 0.89 0.55 - 1.02 MG/DL    BUN/Creatinine ratio 18 12 - 20      GFR est AA >60 >60 ml/min/1.73m2    GFR est non-AA >60 >60 ml/min/1.73m2    Calcium 8.0 (L) 8.5 - 10.1 MG/DL    Bilirubin, total 0.2 0.2 - 1.0 MG/DL    ALT (SGPT) 13 12 - 78 U/L    AST (SGOT) 23 15 - 37 U/L    Alk. phosphatase 55 45 - 117 U/L    Protein, total 5.5 (L) 6.4 - 8.2 g/dL    Albumin 2.8 (L) 3.5 - 5.0 g/dL    Globulin 2.7 2.0 - 4.0 g/dL    A-G Ratio 1.0 (L) 1.1 - 2.2         Medications reviewed  Current Facility-Administered Medications   Medication Dose Route Frequency    0.9% sodium chloride with KCl 40 mEq/L infusion   IntraVENous CONTINUOUS    prochlorperazine (COMPAZINE) tablet 10 mg  10 mg Oral Q6H PRN    therapeutic multivitamin (THERAGRAN) tablet 1 Tab  1 Tab Oral DAILY    scopolamine (TRANSDERM-SCOP) 1 mg over 3 days 1 Patch  1 Patch TransDERmal Q72H    carvedilol (COREG) tablet 12.5 mg  12.5 mg Oral BID WITH MEALS    DULoxetine (CYMBALTA) capsule 20 mg  20 mg Oral DAILY    levothyroxine (SYNTHROID) tablet 50 mcg  50 mcg Oral ACB    simvastatin (ZOCOR) tablet 20 mg  20 mg Oral DAILY    sodium chloride (NS) flush 5-10 mL  5-10 mL IntraVENous Q8H    sodium chloride (NS) flush 5-10 mL  5-10 mL IntraVENous PRN    ceFAZolin (ANCEF) 2 g/20 mL in sterile water IV syringe  2 g IntraVENous Q8H    heparin (porcine) injection 5,000 Units  5,000 Units SubCUTAneous Q8H    acetaminophen (TYLENOL) tablet 650 mg  650 mg Oral Q4H PRN    oxyCODONE-acetaminophen (PERCOCET 7.5) 7.5-325 mg per tablet 1 Tab  1 Tab Oral Q4H PRN    HYDROmorphone (PF) (DILAUDID) injection 1 mg  1 mg IntraVENous Q4H PRN    naloxone (NARCAN) injection 0.4 mg  0.4 mg IntraVENous PRN    folic acid (FOLVITE) tablet 1 mg  1 mg Oral DAILY    Thiamine Mononitrate (B-1) tablet 100 mg  100 mg Oral DAILY    LORazepam (ATIVAN) injection 1 mg  1 mg IntraVENous Q6H PRN    prochlorperazine (COMPAZINE) injection 5 mg  5 mg IntraVENous Q6H PRN       Care Plan discussed with: Patient/Family and Nurse    Total time spent with patient: 30 minutes.     Varghese Jaimes MD

## 2018-03-05 LAB
ALBUMIN SERPL-MCNC: 3.1 G/DL (ref 3.5–5)
ALBUMIN/GLOB SERPL: 0.9 {RATIO} (ref 1.1–2.2)
ALP SERPL-CCNC: 60 U/L (ref 45–117)
ALT SERPL-CCNC: 11 U/L (ref 12–78)
ANION GAP SERPL CALC-SCNC: 6 MMOL/L (ref 5–15)
AST SERPL-CCNC: 24 U/L (ref 15–37)
ATRIAL RATE: 81 BPM
BASOPHILS # BLD: 0 K/UL (ref 0–0.1)
BASOPHILS NFR BLD: 1 % (ref 0–1)
BILIRUB SERPL-MCNC: 0.4 MG/DL (ref 0.2–1)
BUN SERPL-MCNC: 9 MG/DL (ref 6–20)
BUN/CREAT SERPL: 13 (ref 12–20)
CALCIUM SERPL-MCNC: 8.9 MG/DL (ref 8.5–10.1)
CALCULATED P AXIS, ECG09: 43 DEGREES
CALCULATED R AXIS, ECG10: 6 DEGREES
CALCULATED T AXIS, ECG11: 28 DEGREES
CHLORIDE SERPL-SCNC: 101 MMOL/L (ref 97–108)
CO2 SERPL-SCNC: 32 MMOL/L (ref 21–32)
CREAT SERPL-MCNC: 0.68 MG/DL (ref 0.55–1.02)
DIAGNOSIS, 93000: NORMAL
DIFFERENTIAL METHOD BLD: ABNORMAL
EOSINOPHIL # BLD: 0.1 K/UL (ref 0–0.4)
EOSINOPHIL NFR BLD: 2 % (ref 0–7)
ERYTHROCYTE [DISTWIDTH] IN BLOOD BY AUTOMATED COUNT: 13.8 % (ref 11.5–14.5)
GLOBULIN SER CALC-MCNC: 3.5 G/DL (ref 2–4)
GLUCOSE SERPL-MCNC: 105 MG/DL (ref 65–100)
HCT VFR BLD AUTO: 32.1 % (ref 35–47)
HGB BLD-MCNC: 10.7 G/DL (ref 11.5–16)
IMM GRANULOCYTES # BLD: 0 K/UL (ref 0–0.04)
IMM GRANULOCYTES NFR BLD AUTO: 0 % (ref 0–0.5)
LYMPHOCYTES # BLD: 2.3 K/UL (ref 0.8–3.5)
LYMPHOCYTES NFR BLD: 37 % (ref 12–49)
MCH RBC QN AUTO: 31.2 PG (ref 26–34)
MCHC RBC AUTO-ENTMCNC: 33.3 G/DL (ref 30–36.5)
MCV RBC AUTO: 93.6 FL (ref 80–99)
MONOCYTES # BLD: 0.6 K/UL (ref 0–1)
MONOCYTES NFR BLD: 10 % (ref 5–13)
NEUTS SEG # BLD: 3.1 K/UL (ref 1.8–8)
NEUTS SEG NFR BLD: 50 % (ref 32–75)
NRBC # BLD: 0 K/UL (ref 0–0.01)
NRBC BLD-RTO: 0 PER 100 WBC
P-R INTERVAL, ECG05: 178 MS
PLATELET # BLD AUTO: 157 K/UL (ref 150–400)
PMV BLD AUTO: 10.1 FL (ref 8.9–12.9)
POTASSIUM SERPL-SCNC: 4.4 MMOL/L (ref 3.5–5.1)
PROT SERPL-MCNC: 6.6 G/DL (ref 6.4–8.2)
Q-T INTERVAL, ECG07: 392 MS
QRS DURATION, ECG06: 94 MS
QTC CALCULATION (BEZET), ECG08: 455 MS
RBC # BLD AUTO: 3.43 M/UL (ref 3.8–5.2)
SODIUM SERPL-SCNC: 139 MMOL/L (ref 136–145)
VENTRICULAR RATE, ECG03: 81 BPM
WBC # BLD AUTO: 6.2 K/UL (ref 3.6–11)

## 2018-03-05 PROCEDURE — 80053 COMPREHEN METABOLIC PANEL: CPT | Performed by: FAMILY MEDICINE

## 2018-03-05 PROCEDURE — 74011250636 HC RX REV CODE- 250/636: Performed by: ORTHOPAEDIC SURGERY

## 2018-03-05 PROCEDURE — 74011250636 HC RX REV CODE- 250/636: Performed by: FAMILY MEDICINE

## 2018-03-05 PROCEDURE — 74011250637 HC RX REV CODE- 250/637: Performed by: FAMILY MEDICINE

## 2018-03-05 PROCEDURE — 74011250637 HC RX REV CODE- 250/637: Performed by: ORTHOPAEDIC SURGERY

## 2018-03-05 PROCEDURE — 77030038269 HC DRN EXT URIN PURWCK BARD -A

## 2018-03-05 PROCEDURE — 74011250637 HC RX REV CODE- 250/637: Performed by: PHYSICIAN ASSISTANT

## 2018-03-05 PROCEDURE — 36415 COLL VENOUS BLD VENIPUNCTURE: CPT | Performed by: FAMILY MEDICINE

## 2018-03-05 PROCEDURE — 65270000029 HC RM PRIVATE

## 2018-03-05 PROCEDURE — 74011250636 HC RX REV CODE- 250/636: Performed by: INTERNAL MEDICINE

## 2018-03-05 PROCEDURE — 74011250636 HC RX REV CODE- 250/636: Performed by: PHYSICIAN ASSISTANT

## 2018-03-05 PROCEDURE — 85025 COMPLETE CBC W/AUTO DIFF WBC: CPT | Performed by: FAMILY MEDICINE

## 2018-03-05 RX ORDER — CLONIDINE HYDROCHLORIDE 0.1 MG/1
0.2 TABLET ORAL
Status: COMPLETED | OUTPATIENT
Start: 2018-03-05 | End: 2018-03-05

## 2018-03-05 RX ORDER — TRAZODONE HYDROCHLORIDE 50 MG/1
50 TABLET ORAL
Status: DISCONTINUED | OUTPATIENT
Start: 2018-03-05 | End: 2018-03-14 | Stop reason: HOSPADM

## 2018-03-05 RX ORDER — HYDROCHLOROTHIAZIDE 25 MG/1
25 TABLET ORAL DAILY
Status: DISCONTINUED | OUTPATIENT
Start: 2018-03-05 | End: 2018-03-14 | Stop reason: HOSPADM

## 2018-03-05 RX ORDER — DIPHENHYDRAMINE HYDROCHLORIDE 50 MG/ML
25 INJECTION, SOLUTION INTRAMUSCULAR; INTRAVENOUS
Status: DISCONTINUED | OUTPATIENT
Start: 2018-03-05 | End: 2018-03-14 | Stop reason: HOSPADM

## 2018-03-05 RX ORDER — CEPHALEXIN 250 MG/1
500 CAPSULE ORAL EVERY 8 HOURS
Status: DISCONTINUED | OUTPATIENT
Start: 2018-03-06 | End: 2018-03-13

## 2018-03-05 RX ORDER — AMLODIPINE BESYLATE 5 MG/1
10 TABLET ORAL DAILY
Status: DISCONTINUED | OUTPATIENT
Start: 2018-03-05 | End: 2018-03-14 | Stop reason: HOSPADM

## 2018-03-05 RX ORDER — LOSARTAN POTASSIUM 50 MG/1
50 TABLET ORAL DAILY
Status: DISCONTINUED | OUTPATIENT
Start: 2018-03-05 | End: 2018-03-14 | Stop reason: HOSPADM

## 2018-03-05 RX ORDER — DIPHENHYDRAMINE HCL 25 MG
25 CAPSULE ORAL
Status: DISCONTINUED | OUTPATIENT
Start: 2018-03-05 | End: 2018-03-14 | Stop reason: HOSPADM

## 2018-03-05 RX ORDER — GABAPENTIN 300 MG/1
600 CAPSULE ORAL 3 TIMES DAILY
Status: DISCONTINUED | OUTPATIENT
Start: 2018-03-05 | End: 2018-03-14 | Stop reason: HOSPADM

## 2018-03-05 RX ORDER — HYDRALAZINE HYDROCHLORIDE 20 MG/ML
20 INJECTION INTRAMUSCULAR; INTRAVENOUS
Status: DISCONTINUED | OUTPATIENT
Start: 2018-03-05 | End: 2018-03-14 | Stop reason: HOSPADM

## 2018-03-05 RX ADMIN — HEPARIN SODIUM 5000 UNITS: 5000 INJECTION, SOLUTION INTRAVENOUS; SUBCUTANEOUS at 21:43

## 2018-03-05 RX ADMIN — LORAZEPAM 1 MG: 2 INJECTION INTRAMUSCULAR; INTRAVENOUS at 03:18

## 2018-03-05 RX ADMIN — OXYCODONE HYDROCHLORIDE AND ACETAMINOPHEN 1 TABLET: 7.5; 325 TABLET ORAL at 22:06

## 2018-03-05 RX ADMIN — DIPHENHYDRAMINE HYDROCHLORIDE 25 MG: 50 INJECTION, SOLUTION INTRAMUSCULAR; INTRAVENOUS at 11:41

## 2018-03-05 RX ADMIN — LORAZEPAM 1 MG: 2 INJECTION INTRAMUSCULAR; INTRAVENOUS at 17:45

## 2018-03-05 RX ADMIN — HYDROCHLOROTHIAZIDE 25 MG: 25 TABLET ORAL at 12:04

## 2018-03-05 RX ADMIN — Medication 10 ML: at 05:34

## 2018-03-05 RX ADMIN — Medication 100 MG: at 09:25

## 2018-03-05 RX ADMIN — Medication 2 G: at 11:42

## 2018-03-05 RX ADMIN — DULOXETINE HYDROCHLORIDE 20 MG: 20 CAPSULE, DELAYED RELEASE ORAL at 11:40

## 2018-03-05 RX ADMIN — GABAPENTIN 600 MG: 300 CAPSULE ORAL at 15:56

## 2018-03-05 RX ADMIN — AMLODIPINE BESYLATE 10 MG: 5 TABLET ORAL at 15:57

## 2018-03-05 RX ADMIN — HEPARIN SODIUM 5000 UNITS: 5000 INJECTION, SOLUTION INTRAVENOUS; SUBCUTANEOUS at 15:58

## 2018-03-05 RX ADMIN — TRAZODONE HYDROCHLORIDE 50 MG: 50 TABLET ORAL at 22:06

## 2018-03-05 RX ADMIN — SIMVASTATIN 20 MG: 20 TABLET, FILM COATED ORAL at 17:58

## 2018-03-05 RX ADMIN — HEPARIN SODIUM 5000 UNITS: 5000 INJECTION, SOLUTION INTRAVENOUS; SUBCUTANEOUS at 05:34

## 2018-03-05 RX ADMIN — LORAZEPAM 1 MG: 2 INJECTION INTRAMUSCULAR; INTRAVENOUS at 23:12

## 2018-03-05 RX ADMIN — SODIUM CHLORIDE AND POTASSIUM CHLORIDE: 9; 2.98 INJECTION, SOLUTION INTRAVENOUS at 06:47

## 2018-03-05 RX ADMIN — GABAPENTIN 600 MG: 300 CAPSULE ORAL at 21:43

## 2018-03-05 RX ADMIN — CARVEDILOL 12.5 MG: 12.5 TABLET, FILM COATED ORAL at 09:25

## 2018-03-05 RX ADMIN — Medication 2 G: at 22:07

## 2018-03-05 RX ADMIN — LORAZEPAM 1 MG: 2 INJECTION INTRAMUSCULAR; INTRAVENOUS at 09:47

## 2018-03-05 RX ADMIN — THERA TABS 1 TABLET: TAB at 09:25

## 2018-03-05 RX ADMIN — CARVEDILOL 12.5 MG: 12.5 TABLET, FILM COATED ORAL at 17:58

## 2018-03-05 RX ADMIN — CLONIDINE HYDROCHLORIDE 0.2 MG: 0.1 TABLET ORAL at 15:56

## 2018-03-05 RX ADMIN — FOLIC ACID 1 MG: 1 TABLET ORAL at 09:26

## 2018-03-05 RX ADMIN — Medication 10 ML: at 18:00

## 2018-03-05 RX ADMIN — Medication 2 G: at 03:18

## 2018-03-05 RX ADMIN — OXYCODONE HYDROCHLORIDE AND ACETAMINOPHEN 1 TABLET: 7.5; 325 TABLET ORAL at 05:34

## 2018-03-05 RX ADMIN — LOSARTAN POTASSIUM 50 MG: 50 TABLET ORAL at 12:04

## 2018-03-05 RX ADMIN — LEVOTHYROXINE SODIUM 50 MCG: 50 TABLET ORAL at 05:34

## 2018-03-05 RX ADMIN — Medication 10 ML: at 21:43

## 2018-03-05 RX ADMIN — OXYCODONE HYDROCHLORIDE AND ACETAMINOPHEN 1 TABLET: 7.5; 325 TABLET ORAL at 17:58

## 2018-03-05 NOTE — PROGRESS NOTES
Met with pt and her . Obtained additional SNF choices. Referrals sent to [address] [place] [address] and [facility]. Pt has been accepted by [facility].   Bao Lara LCSW

## 2018-03-05 NOTE — PROGRESS NOTES
Family Practice Consult and Daily Progress Note: 3/5/2018  Kyara Torres MD    Assessment/Plan:   MARYJO (acute kidney injury) [facility]) (3/3/2018) vrs some degree of CKD -  likely due to volume depletion. Had vomiting, diuretic use. Holding Hydrodiuril and ARB. Start IVFs and follow renal function     Hypokalemia due to loss of potassium (3/3/2018): from diuretic use. DC hydrodiuril. Replete and follow K+     HTN (hypertension), benign (3/3/2018): Continue Coreg. Hold Losartan due to MARYJO     Depression with anxiety (3/3/2018): resume Cymbalta     Dyslipidemia (3/3/2018): resume simvastatin      Ankle fracture, right (3/2/2018): ortho is attending.  Per ortho    Hx of ETOH abuse - Had to start CIWA protocol yesterday evening- she reports her last drink was Monday        Problem List:  Problem List as of 3/5/2018  Date Reviewed: 8/24/2016          Codes Class Noted - Resolved    MARYJO (acute kidney injury) ([facility] [address] ICD-10-CM: N17.9  ICD-9-CM: 584.9  3/3/2018 - Present        Hypokalemia due to loss of potassium ICD-10-CM: E87.6  ICD-9-CM: 276.8  3/3/2018 - Present        HTN (hypertension), benign ICD-10-CM: I10  ICD-9-CM: 401.1  3/3/2018 - Present        Depression with anxiety ICD-10-CM: F41.8  ICD-9-CM: 300.4  3/3/2018 - Present        Dyslipidemia ICD-10-CM: E78.5  ICD-9-CM: 272.4  3/3/2018 - Present        * (Principal)Ankle fracture, right ICD-10-CM: L89.812Z  ICD-9-CM: 824.8  3/2/2018 - Present        Numbness and tingling of both legs below knees ICD-10-CM: R20.0, R20.2  ICD-9-CM: 782.0  11/30/2015 - Present        Numbness in feet ICD-10-CM: R20.0  ICD-9-CM: 782.0  10/23/2015 - Present        Burning sensation of feet ICD-10-CM: R20.8  ICD-9-CM: 782.0  10/23/2015 - Present        SAH (subarachnoid hemorrhage) (HCC) ICD-10-CM: I60.9  ICD-9-CM: 811  10/23/2015 - Present              Subjective:    60 yo WF, pt Dr Ravi Thomas in our group, admitted after fall and severe fx of right ankle requiring 2 stage surg repair, first repair done 3/2/18. Pt reports no syncope or dizziness prior to fall and no prodrome events. She did walk on the foot for 2 days post fall and had little pain due to her chronic LE neuropathy. We are asked to see her for her medical problems including elevated creat. She does report marked decrease in intake for 2 days since her fall. She has a hx of HTN which has been relatively well controlled outpt. She also has a hx hypothyroidism, LE neuropathy, depression with anxiety and hypothyroidism and reports she is compliant with her medications. In addition, she reports Alcoholism and reports she has been sober for Raguel Dieter couple of months. \"  She is going to call her sponsor today as she is feeling additional stress from the fall and surg. Currently she is stable for follow up surg. 3/4: Had more agitation and confusion last night and had to start CIWA protocol. She is better this am but still having problems with word retrieval. She reports to me that her last drink was Monday but told Dr Evert Gagnon it was months ago. Potassium still low at 2.9. Will replete with po.     3/5:  Agitation and confusion have resolved. K+ back up after repletion. Discussed with ortho. Past Medical History:   Diagnosis Date    Alcohol abuse     Anxiety     Depression     Fatigue     Hypertension     Memory loss     Vertigo     Visual disturbance      Past Surgical History:   Procedure Laterality Date    HX ANKLE FRACTURE TX Right 12/2016    HX APPENDECTOMY      HX ORTHOPAEDIC      right knee repair    HX OTHER SURGICAL  04/2016    double mastectomy     HX OTHER SURGICAL  06/2016    reconstructive surgery on her breasts    HX SHOULDER REPLACEMENT Right 11/2016    HX WRIST FRACTURE TX Left 12/2016     Social History     Social History    Marital status:      Spouse name: N/A    Number of children: N/A    Years of education: N/A     Occupational History    Not on file. Social History Main Topics    Smoking status: Never Smoker    Smokeless tobacco: Never Used    Alcohol use 21.0 oz/week     35 Glasses of wine per week      Comment: last drink was     Drug use: Not on file    Sexual activity: Not on file     Other Topics Concern    Not on file     Social History Narrative     Family History   Problem Relation Age of Onset    Cancer Mother     Other Father      Allergies   Allergen Reactions    Sulfa (Sulfonamide Antibiotics) Swelling and Other (comments)     Flushing and hot feeling   Facial swelling       Review of Systems:     Constitutional ROS: no fever, chills, rigors or night sweats; + for fatigue  Respiratory ROS: no cough, sputum, hemoptysis, dyspnea or pleuritic pain. Cardiovascular ROS: no chest pain, palpitation, orthopnea, PND or syncope  Endocrine ROS: no polydispsia, polyuria, heat or cold intolerance   Gastrointestinal ROS: no dysphagia, abdominal pain, nausea, vomiting, diarrhea or bleeding. Genito-Urinary ROS: no dysuria, frequency, hematuria, retention, or flank pain  Musculoskeletal ROS: right ankle pain. Neurological ROS: no headache, confusion, diplopia, focal symptoms, tremor, or hallucinosis. LE neuropathy unchanged  Psychiatric ROS: ROS: feeling mod depression vrs sadness due to injury/fall she reports, no current anxiety, mood swings  Dermatological ROS: no rash, pruritis, or urticaria    Objective:   Physical Exam:     Visit Vitals    /82 (BP 1 Location: Right arm, BP Patient Position: Head of bed elevated (Comment degrees); At rest)    Pulse 86    Temp 98.1 °F (36.7 °C)    Resp 17    Ht 5' 7\" (1.702 m)    Wt 70.3 kg (155 lb)    SpO2 93%    Breastfeeding No    BMI 24.28 kg/m2    O2 Flow Rate (L/min): 1.5 l/min O2 Device: Room air    Temp (24hrs), Av.4 °F (36.9 °C), Min:98.1 °F (36.7 °C), Max:99.2 °F (37.3 °C)    1901 -  0700  In: 3263 [I.V.:1390]  Out: 800 [Urine:800]    07 - 1900  In: 2841.7 [I.V.:2841.7]  Out: 550 [Urine:550]    General:  Alert, cooperative, no distress, appears stated age. Head:  Normocephalic, without obvious abnormality, atraumatic. Eyes:  Conjunctivae/corneas clear. PERRL, EOMs intact. Nose: Nares normal. Septum midline. Mucosa normal. No drainage or sinus tenderness. Throat: Lips, mucosa, and tongue moist..   Neck: Supple, symmetrical, trachea midline, no adenopathy, thyroid: no enlargement/tenderness/nodules, no carotid bruit and no JVD. Back:   Symmetric, no curvature. ROM normal. No CVA tenderness. Lungs:   Clear to auscultation bilaterally. Chest wall:  No tenderness or deformity. Heart:  Regular rate and rhythm, S1, S2 normal, no murmur, click, rub or gallop. Abdomen:   Soft, non-tender. Bowel sounds normal. No masses,  No organomegaly. Extremities: no cyanosis or edema. No calf tenderness or cords left. Right LE in splint and metal frame/external fixation; moves toes to command. Feet warm     Pulses: 2+ and symmetric all extremities. Skin: Skin color, texture, turgor normal. No rashes or lesions   Neurologic: CNII-XII intact. Alert and oriented X 3. Fine motor of hands and fingers normal.  No tremor.  equal.  No cogwheeling or rigidity. Gait not tested at this time. Sensation grossly normal to touch except LEs. Gross motor of extremities otherwise normal.       Data Review:   EXAM:  XR TIB/FIB RT, XR ANKLE RT MIN 3 V 3/2/18  INDICATION:  fx.  Right ankle deformity after a fall  COMPARISON: None. FINDINGS: AP and lateral  views of the right tibia and fibula. 3 views of the  right ankle. The patient is status post ORIF of the patella. There is mild medial compartment  osteophytosis in the knee. There is chronic deformity of the fibular neck. There  is a displaced fracture of the medial malleolus. The medial malleolus is 1.5 cm  from the distal tibia.  There is a comminuted displaced fracture of the lateral  malleolus at the level of the tibiotalar joint. The talus and distal lateral  malleolus are dislocated laterally by 2.8 cm and slightly retracted. IMPRESSION: Bimalleolar ankle fracture dislocation      Recent Days:  Recent Labs      03/05/18 0028 03/04/18 0334 03/02/18 1856   WBC  6.2  4.8  10.7   HGB  10.7*  9.4*  12.5   HCT  32.1*  27.9*  36.4   PLT  157  104*  155     Recent Labs      03/05/18 0028 03/04/18 0334 03/02/18   1856   NA  139  143  134*   K  4.4  2.9*  3.1*   CL  101  103  93*   CO2  32  31  26   GLU  105*  128*  119*   BUN  9  16  42*   CREA  0.68  0.89  3.16*   CA  8.9  8.0*  9.1   ALB  3.1*  2.8*  4.1   TBILI  0.4  0.2  0.5   SGOT  24  23  35   ALT  11*  13  27   INR   --    --   1.1     No results for input(s): PH, PCO2, PO2, HCO3, FIO2 in the last 72 hours. 24 Hour Results:  Recent Results (from the past 24 hour(s))   CBC WITH AUTOMATED DIFF    Collection Time: 03/05/18 12:28 AM   Result Value Ref Range    WBC 6.2 3.6 - 11.0 K/uL    RBC 3.43 (L) 3.80 - 5.20 M/uL    HGB 10.7 (L) 11.5 - 16.0 g/dL    HCT 32.1 (L) 35.0 - 47.0 %    MCV 93.6 80.0 - 99.0 FL    MCH 31.2 26.0 - 34.0 PG    MCHC 33.3 30.0 - 36.5 g/dL    RDW 13.8 11.5 - 14.5 %    PLATELET 022 973 - 573 K/uL    MPV 10.1 8.9 - 12.9 FL    NRBC 0.0 0  WBC    ABSOLUTE NRBC 0.00 0.00 - 0.01 K/uL    NEUTROPHILS 50 32 - 75 %    LYMPHOCYTES 37 12 - 49 %    MONOCYTES 10 5 - 13 %    EOSINOPHILS 2 0 - 7 %    BASOPHILS 1 0 - 1 %    IMMATURE GRANULOCYTES 0 0.0 - 0.5 %    ABS. NEUTROPHILS 3.1 1.8 - 8.0 K/UL    ABS. LYMPHOCYTES 2.3 0.8 - 3.5 K/UL    ABS. MONOCYTES 0.6 0.0 - 1.0 K/UL    ABS. EOSINOPHILS 0.1 0.0 - 0.4 K/UL    ABS. BASOPHILS 0.0 0.0 - 0.1 K/UL    ABS. IMM.  GRANS. 0.0 0.00 - 0.04 K/UL    DF AUTOMATED     METABOLIC PANEL, COMPREHENSIVE    Collection Time: 03/05/18 12:28 AM   Result Value Ref Range    Sodium 139 136 - 145 mmol/L    Potassium 4.4 3.5 - 5.1 mmol/L    Chloride 101 97 - 108 mmol/L    CO2 32 21 - 32 mmol/L    Anion gap 6 5 - 15 mmol/L Glucose 105 (H) 65 - 100 mg/dL    BUN 9 6 - 20 MG/DL    Creatinine 0.68 0.55 - 1.02 MG/DL    BUN/Creatinine ratio 13 12 - 20      GFR est AA >60 >60 ml/min/1.73m2    GFR est non-AA >60 >60 ml/min/1.73m2    Calcium 8.9 8.5 - 10.1 MG/DL    Bilirubin, total 0.4 0.2 - 1.0 MG/DL    ALT (SGPT) 11 (L) 12 - 78 U/L    AST (SGOT) 24 15 - 37 U/L    Alk.  phosphatase 60 45 - 117 U/L    Protein, total 6.6 6.4 - 8.2 g/dL    Albumin 3.1 (L) 3.5 - 5.0 g/dL    Globulin 3.5 2.0 - 4.0 g/dL    A-G Ratio 0.9 (L) 1.1 - 2.2         Medications reviewed  Current Facility-Administered Medications   Medication Dose Route Frequency    0.9% sodium chloride with KCl 40 mEq/L infusion   IntraVENous CONTINUOUS    prochlorperazine (COMPAZINE) tablet 10 mg  10 mg Oral Q6H PRN    therapeutic multivitamin (THERAGRAN) tablet 1 Tab  1 Tab Oral DAILY    scopolamine (TRANSDERM-SCOP) 1 mg over 3 days 1 Patch  1 Patch TransDERmal Q72H    carvedilol (COREG) tablet 12.5 mg  12.5 mg Oral BID WITH MEALS    DULoxetine (CYMBALTA) capsule 20 mg  20 mg Oral DAILY    levothyroxine (SYNTHROID) tablet 50 mcg  50 mcg Oral ACB    simvastatin (ZOCOR) tablet 20 mg  20 mg Oral DAILY    sodium chloride (NS) flush 5-10 mL  5-10 mL IntraVENous Q8H    sodium chloride (NS) flush 5-10 mL  5-10 mL IntraVENous PRN    ceFAZolin (ANCEF) 2 g/20 mL in sterile water IV syringe  2 g IntraVENous Q8H    heparin (porcine) injection 5,000 Units  5,000 Units SubCUTAneous Q8H    acetaminophen (TYLENOL) tablet 650 mg  650 mg Oral Q4H PRN    oxyCODONE-acetaminophen (PERCOCET 7.5) 7.5-325 mg per tablet 1 Tab  1 Tab Oral Q4H PRN    HYDROmorphone (PF) (DILAUDID) injection 1 mg  1 mg IntraVENous Q4H PRN    naloxone (NARCAN) injection 0.4 mg  0.4 mg IntraVENous PRN    folic acid (FOLVITE) tablet 1 mg  1 mg Oral DAILY    Thiamine Mononitrate (B-1) tablet 100 mg  100 mg Oral DAILY    LORazepam (ATIVAN) injection 1 mg  1 mg IntraVENous Q6H PRN    prochlorperazine (COMPAZINE) injection 5 mg  5 mg IntraVENous Q6H PRN       Care Plan discussed with: Patient/Family and Nurse    Total time spent with patient: 30 minutes.     Clarissa Morris MD

## 2018-03-05 NOTE — PROGRESS NOTES
Attempted to see patient but currently with increased blood pressure: 193/103, then 200/108. Notified nursing. Patient upset and stating, \"I just need to be on my regular medication. \"  Patient talking to  on phone demanding that he bring her home medication. Nursing aware. Will defer for now and check on her later.

## 2018-03-05 NOTE — PROGRESS NOTES
Bedside and Verbal shift change report given to Mitesh Dickinson (oncoming nurse) by Ramakrishna Gonzalez (offgoing nurse). Report included the following information SBAR, Kardex and Recent Results.

## 2018-03-05 NOTE — CDMP QUERY
=> Alcohol withdrawal as evidenced by confusion & agitation treated with IV Ativan 2mg and placed on CIWA protocol   => Other explanation of clinical findings  => Unable to determine (no explanation for clinical findings)    The medical record reflects the following clinical findings, treatment, and risk factors. Risk Factors:  60 yo F admitted with open rt bimalleolar fx   Clinical Indicators:  h/o ETOH dependence,  3/3 NN states \" patient is getting irritable,.confused and agitated despite administration of Ativan and Percocet. Patient placed on CIWA protocol\"  CIWA score 9  Treatment: IV Ativan 2mg and placed on CIWA protocol     Please clarify and document your clinical opinion in the progress notes and discharge summary including the definitive and/or presumptive diagnosis, (suspected or probable), related to the above clinical findings. Please include clinical findings supporting your diagnosis.       Thank you for your time   [facility] RN/BSN, [address]  Desk:   [phone]   Other:  [phone]

## 2018-03-05 NOTE — PROGRESS NOTES
3/5/2018  12:54 PM  CM received notification from [id number] [facility], unable to accept pt for rehab as they are out of network.   Audrey Avery

## 2018-03-05 NOTE — PROGRESS NOTES
Called MD to notify no improvement post administration of PTA BP meds. Received orders for NOW clonidine and daily norvasc see MAR.

## 2018-03-05 NOTE — PROGRESS NOTES
Spiritual Care Partner Volunteer visited patient in post surg on 3/5/2018. Documented by:  Rev. Rufino Bonner.  Risa Morin MA, [facility]    Lead  Profession Development & Advancement

## 2018-03-05 NOTE — PROGRESS NOTES
Subjective:    Lance Grajeda is a 61 y.o. female who is POD3 s/p I&D and ex fix RIGHT open ankle fx dislocation    Major Events:. Creatinine and K+ improved. Pain controlled, denies cp/sob    Objective:    Vital signs in last 24 hours:    Temp:  [97.9 °F (36.6 °C)-99.4 °F (37.4 °C)]   Pulse (Heart Rate):  []   BP: ()/(57-97)   Resp Rate:  [11-19]   O2 Sat (%):  [86 %-100 %]   Weight:  [70.3 kg (155 lb)]     Temp (24hrs), Av.4 °F (36.9 °C), Min:98.1 °F (36.7 °C), Max:99.2 °F (37.3 °C)      Labs:    Lab Results   Component Value Date/Time    WBC 6.2 2018 12:28 AM    HGB 10.7 (L) 2018 12:28 AM    HCT 32.1 (L) 2018 12:28 AM    PLATELET 485  12:28 AM       Lab Results   Component Value Date/Time    Sodium 139 2018 12:28 AM    Potassium 4.4 2018 12:28 AM    Chloride 101 2018 12:28 AM    CO2 32 2018 12:28 AM    BUN 9 2018 12:28 AM       Physical Exam:    General: alert, cooperative, in NAD  Nonlabored resp    RIGHT Lower extremity    Dressing: changed, medial ankle wound healing well, no purulence, mod ankle swelling      Motor: + toe df/pf    Sensory: abnormal sensation, not protective    Vascular: Brisk capillary refill in toes    Assessment/Plan:    · Pain management: as written    · DVT Prophylaxis: cont as written    · PT/OT: NWB RLE    · Dispo: to SNF today if accepted  Plan for definitive fixation of ankle with removal of ex-fix and primary fusion on Thursday 3/8/2017. Pt to see me in clinic on Wednesday 3/7 at [facility]      Genevieve Carter.  Daily Patel MD

## 2018-03-05 NOTE — PROGRESS NOTES
3/5/2018  12:26 PM  EMR reviewed CM met w/ pt for d/c planning, charted demographics verified, lives w/  Peder Boas (C) [phone] in 1 story home w/ 1 entry step. PTA independent w/ ADL's and drives. PCP is Husam Duong MD; Rx fill at [facility]. PT has not had HH; current DME: RW, crutches. Care Management Interventions  PCP Verified by CM: Yes Husam Duong MD; last visit 2 mo ago)  Palliative Care Criteria Met (RRAT>21 & CHF Dx)?: No  Mode of Transport at Discharge:  (TBD)  Transition of Care Consult (CM Consult): SNF  Partner SNF: No  Reason Why Partner SNF Not Chosen: Friend/family recommendation  Physical Therapy Consult: Yes  Speech Therapy Consult: No  Current Support Network: Lives with Spouse (Pt lives w/  Peder Boas (C) [phone])  Confirm Follow Up Transport: Family  Discharge Location  Discharge Placement: Skilled nursing facility  Current plan is d/c to SNF rehab, CM discussed rehab choices w/ Pt., recommended 3 choices, gave list to review. Pt would like referral to [facility], will review additional choices w/  today. Referral to [facility] sent in [facility].   CM will follow and assist.  Kayode Crandall

## 2018-03-05 NOTE — PROGRESS NOTES
brought bottles - I updated med rec    [facility]: MED RECONCILIATION    Comments/Recommendations: Spoke to PA regarding meds that were updated    Medications added:     · None    Medications removed:    · None    Medications adjusted:    · Gabapentin changed to 600 mg po TID  · Trazodone changed to  mg po qhs prn sleep    Information obtained from: [address], , patient    Significant PMH/Disease States:   Past Medical History:   Diagnosis Date    Alcohol abuse     Anxiety     Depression     Fatigue     Hypertension     Memory loss     Vertigo     Visual disturbance      Chief Complaint for this Admission:   Chief Complaint   Patient presents with    Fall    Ankle Injury     Allergies: Sulfa (sulfonamide antibiotics)    Prior to Admission Medications:     Medication Documentation Review Audit       Reviewed by Sarina Metzger (Pharmacist) on 03/05/18 at 1119         Medication Sig Documenting Provider Last Dose Status Taking?      aspirin delayed-release 325 mg tablet Take 325 mg by mouth every Monday, Wednesday, Friday. Historical Provider 2/28/2018 Active Yes    carvedilol (COREG) 12.5 mg tablet Take 12.5 mg by mouth two (2) times daily (with meals). Historical Provider 3/2/2018 AM Active Yes    DULoxetine (CYMBALTA) 20 mg capsule Take 20 mg by mouth daily. Historical Provider 3/2/2018 AM Active Yes    gabapentin (NEURONTIN) 300 mg capsule Take 600 mg by mouth three (3) times daily. Historical Provider  Active Yes    hydroCHLOROthiazide (HYDRODIURIL) 25 mg tablet Take 25 mg by mouth daily. Historical Provider 3/2/2018 AM Active Yes    letrozole (FEMARA) 2.5 mg tablet Take 2.5 mg by mouth daily. Historical Provider 2/28/2018 Active Yes    levothyroxine (SYNTHROID) 50 mcg tablet Take 50 mcg by mouth Daily (before breakfast).  Historical Provider 3/2/2018 AM Active Yes             Med Note Alanis Floyd   Fri Oct 23, 2015  3:40 PM): Received from: External Pharmacy      losartan (COZAAR) 50 mg tablet Take 50 mg by mouth daily. Historical Provider 3/2/2018 AM Active Yes             Med Nuha Lassiterlen Fabry   Fri Oct 23, 2015  3:40 PM): Received from: External Pharmacy      multivitamin, tx-iron-ca-min (THERA-M W/ IRON) 9 mg iron-400 mcg tab tablet Take 1 Tab by mouth daily. Historical Provider 3/2/2018 AM Active Yes    simvastatin (ZOCOR) 20 mg tablet Take 20 mg by mouth nightly. Historical Provider 3/1/2018 Active Yes    traZODone (DESYREL) 50 mg tablet Take  mg by mouth nightly as needed for Sleep.  Historical Provider 2/28/2018 Active Yes                  [address] [place] [address]: [phone]

## 2018-03-05 NOTE — PROGRESS NOTES
High Blood Pressure:    Patient with high blood pressure systolic >026 see vitals. Ativan administered since patient with severe anxiety. Vitals rechecked after administration and BP still elevated. MD notified. Orders received to resume PTA losartan and HCTZ. See orders.

## 2018-03-06 LAB
ALBUMIN SERPL-MCNC: 3.2 G/DL (ref 3.5–5)
ALBUMIN/GLOB SERPL: 0.9 {RATIO} (ref 1.1–2.2)
ALP SERPL-CCNC: 62 U/L (ref 45–117)
ALT SERPL-CCNC: 10 U/L (ref 12–78)
ANION GAP SERPL CALC-SCNC: 6 MMOL/L (ref 5–15)
AST SERPL-CCNC: 25 U/L (ref 15–37)
BASOPHILS # BLD: 0 K/UL (ref 0–0.1)
BASOPHILS NFR BLD: 1 % (ref 0–1)
BILIRUB SERPL-MCNC: 0.5 MG/DL (ref 0.2–1)
BUN SERPL-MCNC: 12 MG/DL (ref 6–20)
BUN/CREAT SERPL: 15 (ref 12–20)
CALCIUM SERPL-MCNC: 9.2 MG/DL (ref 8.5–10.1)
CHLORIDE SERPL-SCNC: 97 MMOL/L (ref 97–108)
CO2 SERPL-SCNC: 32 MMOL/L (ref 21–32)
CREAT SERPL-MCNC: 0.81 MG/DL (ref 0.55–1.02)
DIFFERENTIAL METHOD BLD: ABNORMAL
EOSINOPHIL # BLD: 0.2 K/UL (ref 0–0.4)
EOSINOPHIL NFR BLD: 3 % (ref 0–7)
ERYTHROCYTE [DISTWIDTH] IN BLOOD BY AUTOMATED COUNT: 14 % (ref 11.5–14.5)
GLOBULIN SER CALC-MCNC: 3.7 G/DL (ref 2–4)
GLUCOSE SERPL-MCNC: 106 MG/DL (ref 65–100)
HCT VFR BLD AUTO: 33.7 % (ref 35–47)
HGB BLD-MCNC: 11.4 G/DL (ref 11.5–16)
IMM GRANULOCYTES # BLD: 0 K/UL (ref 0–0.04)
IMM GRANULOCYTES NFR BLD AUTO: 0 % (ref 0–0.5)
LYMPHOCYTES # BLD: 3 K/UL (ref 0.8–3.5)
LYMPHOCYTES NFR BLD: 46 % (ref 12–49)
MCH RBC QN AUTO: 31.8 PG (ref 26–34)
MCHC RBC AUTO-ENTMCNC: 33.8 G/DL (ref 30–36.5)
MCV RBC AUTO: 94.1 FL (ref 80–99)
MONOCYTES # BLD: 0.6 K/UL (ref 0–1)
MONOCYTES NFR BLD: 10 % (ref 5–13)
NEUTS SEG # BLD: 2.7 K/UL (ref 1.8–8)
NEUTS SEG NFR BLD: 40 % (ref 32–75)
NRBC # BLD: 0 K/UL (ref 0–0.01)
NRBC BLD-RTO: 0 PER 100 WBC
PLATELET # BLD AUTO: 158 K/UL (ref 150–400)
PMV BLD AUTO: 10.1 FL (ref 8.9–12.9)
POTASSIUM SERPL-SCNC: 4.1 MMOL/L (ref 3.5–5.1)
PROT SERPL-MCNC: 6.9 G/DL (ref 6.4–8.2)
RBC # BLD AUTO: 3.58 M/UL (ref 3.8–5.2)
SODIUM SERPL-SCNC: 135 MMOL/L (ref 136–145)
WBC # BLD AUTO: 6.6 K/UL (ref 3.6–11)

## 2018-03-06 PROCEDURE — 36415 COLL VENOUS BLD VENIPUNCTURE: CPT | Performed by: FAMILY MEDICINE

## 2018-03-06 PROCEDURE — 80053 COMPREHEN METABOLIC PANEL: CPT | Performed by: FAMILY MEDICINE

## 2018-03-06 PROCEDURE — 85025 COMPLETE CBC W/AUTO DIFF WBC: CPT | Performed by: FAMILY MEDICINE

## 2018-03-06 PROCEDURE — 74011250637 HC RX REV CODE- 250/637: Performed by: PHYSICIAN ASSISTANT

## 2018-03-06 PROCEDURE — 74011250637 HC RX REV CODE- 250/637: Performed by: FAMILY MEDICINE

## 2018-03-06 PROCEDURE — 74011250636 HC RX REV CODE- 250/636: Performed by: ORTHOPAEDIC SURGERY

## 2018-03-06 PROCEDURE — 74011250636 HC RX REV CODE- 250/636: Performed by: FAMILY MEDICINE

## 2018-03-06 PROCEDURE — 65270000029 HC RM PRIVATE

## 2018-03-06 PROCEDURE — 77030038269 HC DRN EXT URIN PURWCK BARD -A

## 2018-03-06 PROCEDURE — 97530 THERAPEUTIC ACTIVITIES: CPT

## 2018-03-06 PROCEDURE — 74011250637 HC RX REV CODE- 250/637: Performed by: ORTHOPAEDIC SURGERY

## 2018-03-06 RX ORDER — LETROZOLE 2.5 MG/1
2.5 TABLET, FILM COATED ORAL DAILY
Status: DISCONTINUED | OUTPATIENT
Start: 2018-03-06 | End: 2018-03-14 | Stop reason: HOSPADM

## 2018-03-06 RX ADMIN — OXYCODONE HYDROCHLORIDE AND ACETAMINOPHEN 1 TABLET: 7.5; 325 TABLET ORAL at 16:31

## 2018-03-06 RX ADMIN — HEPARIN SODIUM 5000 UNITS: 5000 INJECTION, SOLUTION INTRAVENOUS; SUBCUTANEOUS at 21:04

## 2018-03-06 RX ADMIN — CEPHALEXIN 500 MG: 250 CAPSULE ORAL at 21:04

## 2018-03-06 RX ADMIN — CEPHALEXIN 500 MG: 250 CAPSULE ORAL at 05:28

## 2018-03-06 RX ADMIN — HYDROCHLOROTHIAZIDE 25 MG: 25 TABLET ORAL at 08:55

## 2018-03-06 RX ADMIN — DULOXETINE HYDROCHLORIDE 20 MG: 20 CAPSULE, DELAYED RELEASE ORAL at 09:14

## 2018-03-06 RX ADMIN — THERA TABS 1 TABLET: TAB at 08:54

## 2018-03-06 RX ADMIN — TRAZODONE HYDROCHLORIDE 50 MG: 50 TABLET ORAL at 21:04

## 2018-03-06 RX ADMIN — OXYCODONE HYDROCHLORIDE AND ACETAMINOPHEN 1 TABLET: 7.5; 325 TABLET ORAL at 21:04

## 2018-03-06 RX ADMIN — GABAPENTIN 600 MG: 300 CAPSULE ORAL at 08:53

## 2018-03-06 RX ADMIN — GABAPENTIN 600 MG: 300 CAPSULE ORAL at 16:30

## 2018-03-06 RX ADMIN — SIMVASTATIN 20 MG: 20 TABLET, FILM COATED ORAL at 17:29

## 2018-03-06 RX ADMIN — HEPARIN SODIUM 5000 UNITS: 5000 INJECTION, SOLUTION INTRAVENOUS; SUBCUTANEOUS at 16:31

## 2018-03-06 RX ADMIN — Medication 100 MG: at 08:53

## 2018-03-06 RX ADMIN — LETROZOLE 2.5 MG: 2.5 TABLET, FILM COATED ORAL at 12:23

## 2018-03-06 RX ADMIN — CARVEDILOL 12.5 MG: 12.5 TABLET, FILM COATED ORAL at 08:55

## 2018-03-06 RX ADMIN — CEPHALEXIN 500 MG: 250 CAPSULE ORAL at 16:30

## 2018-03-06 RX ADMIN — LOSARTAN POTASSIUM 50 MG: 50 TABLET ORAL at 08:55

## 2018-03-06 RX ADMIN — GABAPENTIN 600 MG: 300 CAPSULE ORAL at 21:04

## 2018-03-06 RX ADMIN — LORAZEPAM 1 MG: 2 INJECTION INTRAMUSCULAR; INTRAVENOUS at 12:23

## 2018-03-06 RX ADMIN — CARVEDILOL 12.5 MG: 12.5 TABLET, FILM COATED ORAL at 16:30

## 2018-03-06 RX ADMIN — OXYCODONE HYDROCHLORIDE AND ACETAMINOPHEN 1 TABLET: 7.5; 325 TABLET ORAL at 03:42

## 2018-03-06 RX ADMIN — HEPARIN SODIUM 5000 UNITS: 5000 INJECTION, SOLUTION INTRAVENOUS; SUBCUTANEOUS at 05:28

## 2018-03-06 RX ADMIN — Medication 10 ML: at 05:29

## 2018-03-06 RX ADMIN — OXYCODONE HYDROCHLORIDE AND ACETAMINOPHEN 1 TABLET: 7.5; 325 TABLET ORAL at 08:52

## 2018-03-06 RX ADMIN — Medication 10 ML: at 16:32

## 2018-03-06 RX ADMIN — LORAZEPAM 1 MG: 2 INJECTION INTRAMUSCULAR; INTRAVENOUS at 19:24

## 2018-03-06 RX ADMIN — AMLODIPINE BESYLATE 10 MG: 5 TABLET ORAL at 08:54

## 2018-03-06 RX ADMIN — LORAZEPAM 1 MG: 2 INJECTION INTRAMUSCULAR; INTRAVENOUS at 05:28

## 2018-03-06 RX ADMIN — LEVOTHYROXINE SODIUM 50 MCG: 50 TABLET ORAL at 05:28

## 2018-03-06 RX ADMIN — Medication 10 ML: at 21:04

## 2018-03-06 RX ADMIN — FOLIC ACID 1 MG: 1 TABLET ORAL at 08:55

## 2018-03-06 NOTE — PROGRESS NOTES
Orthopaedic Progress Note  Post Op day: 4 Days Post-Op    2018 2:40 PM     Patient: Best Garcia MRN: [id number]  SSN: [id number]    YOB: 1955  Age: 61 y.o. Sex: female      Admit date:  3/2/2018  Date of Surgery:  3/2/2018   Procedures:  Procedure(s):  ANKLE OPEN REDUCTION INTERNAL FIXATION  Admitting Physician:  Chriss Sahni MD   Surgeon:  Jessica Stanley) and Role:     * Chriss Sahni MD - Primary    Consulting Physician(s): Treatment Team: Attending Provider: Chriss Sahni MD; Consulting Provider: Chriss Sahni MD; Physician: Florentin Bazan MD; Utilization Review: Reynold Carver; Surgeon: Nathalie Broderick MD; Care Manager: Mavis Dillon    SUBJECTIVE:     Best Garcia is a 61 y.o. female is 4 Days Post-Op s/p Procedure(s):  ANKLE OPEN REDUCTION, EXTERNAL FIXATION with an appropriate level of post-operative pain. No complaints of nausea, vomiting, dizziness, lightheadedness, chest pain, or shortness of breath. Pt worked with PT, did well with WB through LLE, maintaining NWB through RLE. Pt was very reluctant to try to get OOB, long discussion with patient regarding attempts. Pt complains of increase in anxiety. OBJECTIVE:       Physical Exam:  General: Alert, cooperative, no distress. Respiratory: Respirations unlabored  Neurological:  Neurovascular exam within normal limits. Motor: + DF/PF. Musculoskeletal: Calves soft, supple, non-tender upon palpation. Dressing/Wound:  External fixation to RLE, minor serosanguinous drainage noted. PP +2. Swelling mild.        Vital Signs:      Patient Vitals for the past 8 hrs:   BP Temp Pulse Resp SpO2   18 1218 103/56 98.4 °F (36.9 °C) (!) 103 16 97 %   18 1210 126/68 - (!) 108 - 98 %   18 1149 128/74 - (!) 115 - 98 %   18 1120 (!) 158/92 - 90 - 95 %   18 0808 179/90 97.4 °F (36.3 °C) 85 18 96 %                                          Temp (24hrs), Av °F (36.7 °C), Min:97.4 °F (36.3 °C), Max:98.5 °F (36.9 °C)      Labs:        Recent Labs      03/06/18   0432   HCT  33.7*   HGB  11.4*     Lab Results   Component Value Date/Time    Sodium 135 (L) 03/06/2018 04:32 AM    Potassium 4.1 03/06/2018 04:32 AM    Chloride 97 03/06/2018 04:32 AM    CO2 32 03/06/2018 04:32 AM    Glucose 106 (H) 03/06/2018 04:32 AM    BUN 12 03/06/2018 04:32 AM    Creatinine 0.81 03/06/2018 04:32 AM    Calcium 9.2 03/06/2018 04:32 AM       PT/OT:        Gait  Base of Support: Shift to left  Speed/Carri: Pace decreased (<100 feet/min), Slow  Step Length: Left shortened  Gait Abnormalities: Antalgic, Decreased step clearance (tends to manuever RW too far ahead)  Ambulation - Level of Assistance: Minimal assistance, Additional time, Assist x1  Distance (ft): 4 Feet (ft)  Assistive Device: Brace/Splint, Gait belt, Walker, rolling (external fixator to right ankle)  Stairs - Level of Assistance:  (NT)       Patient mobility  Bed Mobility Training  Rolling:  (NT)  Supine to Sit: Stand-by assistance  Sit to Supine:  (NT to chair)  Scooting: Supervision  Transfer Training  Sit to Stand: Minimum assistance, Additional time, Assist x1 (SBA of second person)  Stand to Sit: Minimum assistance, Additional time, Assist x1 (SBA of second person)  Bed to Chair: Maximum assistance, Assist x1  Sliding Board : Assist x1  Level of Assistance: Maximum assistance      Gait Training  Assistive Device: Brace/Splint, Gait belt, Walker, rolling (external fixator to right ankle)  Ambulation - Level of Assistance: Minimal assistance, Additional time, Assist x1  Distance (ft): 4 Feet (ft)  Stairs - Level of Assistance:  (NT)   Weight Bearing Status  Right Side Weight Bearing: Non-weight bearing  Left Side Weight Bearing: As tolerated        ASSESSMENT / PLAN:   Principal Problem:    Ankle fracture, right (3/2/2018)    Active Problems:    MARYJO (acute kidney injury) ([facility] [address] (3/3/2018)      Hypokalemia due to loss of potassium (3/3/2018)      HTN (hypertension), benign (3/3/2018)      Depression with anxiety (3/3/2018)      Dyslipidemia (3/3/2018)                    Pain Control: Adequate with oral agents and PRN IV narcotics   DVT Prophylaxis: Heparin 5000 units Q8 hours , SCDs, KYLIE Hose    Therapy/ Weight bearing: Strict NWB RLE   Wound/ incisional issues: Minor drainage noted on heel dressing/ pin site   Medical concerns: HTN, alcohol withdrawal (symptoms improving). Disposition: Pt's insurance does not cover SNF or rehab. Awaiting further options from case management: possible [facility]? Concern about safety if patient goes home prior to surgery that Dr. Geovanny Laura is planning for next Wednesday. Pt will need to see Dr. Geovanny Laura in the office on Monday of next week (3/12/18)  Awaiting options from case management.       Signed By:  Caleb Ceron NP    [facility] [place]  [facility]

## 2018-03-06 NOTE — PROGRESS NOTES
Family Practice Consult and Daily Progress Note: 3/6/2018  Jose De Jesus Merida MD    Assessment/Plan:   MARYJO (acute kidney injury) [facility]) (3/3/2018) vrs some degree of CKD -  likely due to volume depletion. Had vomiting, diuretic use. Holding Hydrodiuril and ARB. Start IVFs and follow renal function     Hypokalemia due to loss of potassium (3/3/2018): from diuretic use. DC hydrodiuril. Replete and follow K+     HTN (hypertension), benign (3/3/2018): Continue Coreg. Resumed Losarten and HCTZ. Added Amlodipine.      Depression with anxiety (3/3/2018): resume Cymbalta     Dyslipidemia (3/3/2018): resume simvastatin      Ankle fracture, right (3/2/2018): ortho is attending. Per ortho    Hx of ETOH abuse - Had to start CIWA protocol yesterday evening- she reports her last drink was Monday.     Alcohol withdrawal as evidenced by confusion & agitation treated with IV Ativan 2mg and placed on CIWA protocol - resolved        Problem List:  Problem List as of 3/6/2018  Date Reviewed: 8/24/2016          Codes Class Noted - Resolved    MARYJO (acute kidney injury) ([facility] [address] ICD-10-CM: N17.9  ICD-9-CM: 584.9  3/3/2018 - Present        Hypokalemia due to loss of potassium ICD-10-CM: E87.6  ICD-9-CM: 276.8  3/3/2018 - Present        HTN (hypertension), benign ICD-10-CM: I10  ICD-9-CM: 401.1  3/3/2018 - Present        Depression with anxiety ICD-10-CM: F41.8  ICD-9-CM: 300.4  3/3/2018 - Present        Dyslipidemia ICD-10-CM: E78.5  ICD-9-CM: 272.4  3/3/2018 - Present        * (Principal)Ankle fracture, right ICD-10-CM: J15.997W  ICD-9-CM: 824.8  3/2/2018 - Present        Numbness and tingling of both legs below knees ICD-10-CM: R20.0, R20.2  ICD-9-CM: 782.0  11/30/2015 - Present        Numbness in feet ICD-10-CM: R20.0  ICD-9-CM: 782.0  10/23/2015 - Present        Burning sensation of feet ICD-10-CM: R20.8  ICD-9-CM: 782.0  10/23/2015 - Present        SAH (subarachnoid hemorrhage) (HCC) ICD-10-CM: I60.9  ICD-9-CM: 743 10/23/2015 - Present              Subjective:    60 yo WF, pt Dr Joceline Huynh in our group, admitted after fall and severe fx of right ankle requiring 2 stage surg repair, first repair done 3/2/18. Pt reports no syncope or dizziness prior to fall and no prodrome events. She did walk on the foot for 2 days post fall and had little pain due to her chronic LE neuropathy. We are asked to see her for her medical problems including elevated creat. She does report marked decrease in intake for 2 days since her fall. She has a hx of HTN which has been relatively well controlled outpt. She also has a hx hypothyroidism, LE neuropathy, depression with anxiety and hypothyroidism and reports she is compliant with her medications. In addition, she reports Alcoholism and reports she has been sober for Cornelio Ouch couple of months. \"  She is going to call her sponsor today as she is feeling additional stress from the fall and surg. Currently she is stable for follow up surg. 3/4: Had more agitation and confusion last night and had to start CIWA protocol. She is better this am but still having problems with word retrieval. She reports to me that her last drink was Monday but told Dr Aaron Edgar it was months ago. Potassium still low at 2.9. Will replete with po.     3/5:  Agitation and confusion have resolved. K+ back up after repletion. Discussed with ortho. 3/6:  Feeling better this AM.  BP spiked last night and Amlodipine added and resumed her usual BP meds - BP now better.   No further signs of ETOH withdrawal.        Past Medical History:   Diagnosis Date    Alcohol abuse     Anxiety     Depression     Fatigue     Hypertension     Memory loss     Vertigo     Visual disturbance      Past Surgical History:   Procedure Laterality Date    HX ANKLE FRACTURE TX Right 12/2016    HX APPENDECTOMY      HX ORTHOPAEDIC      right knee repair    HX OTHER SURGICAL  04/2016    double mastectomy     HX OTHER SURGICAL  06/2016 reconstructive surgery on her breasts    HX SHOULDER REPLACEMENT Right 11/2016    HX WRIST FRACTURE TX Left 12/2016     Social History     Social History    Marital status:      Spouse name: N/A    Number of children: N/A    Years of education: N/A     Occupational History    Not on file. Social History Main Topics    Smoking status: Never Smoker    Smokeless tobacco: Never Used    Alcohol use 21.0 oz/week     35 Glasses of wine per week      Comment: last drink was 2/27    Drug use: Not on file    Sexual activity: Not on file     Other Topics Concern    Not on file     Social History Narrative     Family History   Problem Relation Age of Onset    Cancer Mother     Other Father      Allergies   Allergen Reactions    Sulfa (Sulfonamide Antibiotics) Swelling and Other (comments)     Flushing and hot feeling   Facial swelling       Review of Systems:     Constitutional ROS: no fever, chills, rigors or night sweats; + for fatigue  Respiratory ROS: no cough, sputum, hemoptysis, dyspnea or pleuritic pain. Cardiovascular ROS: no chest pain, palpitation, orthopnea, PND or syncope  Endocrine ROS: no polydispsia, polyuria, heat or cold intolerance   Gastrointestinal ROS: no dysphagia, abdominal pain, nausea, vomiting, diarrhea or bleeding. Genito-Urinary ROS: no dysuria, frequency, hematuria, retention, or flank pain  Musculoskeletal ROS: right ankle pain. Neurological ROS: no headache, confusion, diplopia, focal symptoms, tremor, or hallucinosis.  LE neuropathy unchanged  Psychiatric ROS: ROS: feeling mod depression vrs sadness due to injury/fall she reports, no current anxiety, mood swings  Dermatological ROS: no rash, pruritis, or urticaria    Objective:   Physical Exam:     Visit Vitals    /84    Pulse 77    Temp 97.5 °F (36.4 °C)    Resp 19    Ht 5' 7\" (1.702 m)    Wt 70.3 kg (155 lb)    SpO2 96%    Breastfeeding No    BMI 24.28 kg/m2    O2 Flow Rate (L/min): 1.5 l/min O2 Device: Room air    Temp (24hrs), Av.1 °F (36.7 °C), Min:97.5 °F (36.4 °C), Max:98.5 °F (36.9 °C)    1901 -  0700  In: -   Out: 750 [Urine:750]   701 - 1900  In: 1177 [I.V.:1390]  Out: 1900 [Urine:1900]    General:  Alert, cooperative, no distress, appears stated age. Head:  Normocephalic, without obvious abnormality, atraumatic. Eyes:  Conjunctivae/corneas clear. PERRL, EOMs intact. Nose: Nares normal. Septum midline. Mucosa normal. No drainage or sinus tenderness. Throat: Lips, mucosa, and tongue moist..   Neck: Supple, symmetrical, trachea midline, no adenopathy, thyroid: no enlargement/tenderness/nodules, no carotid bruit and no JVD. Back:   Symmetric, no curvature. ROM normal. No CVA tenderness. Lungs:   Clear to auscultation bilaterally. Chest wall:  No tenderness or deformity. Heart:  Regular rate and rhythm, S1, S2 normal, no murmur, click, rub or gallop. Abdomen:   Soft, non-tender. Bowel sounds normal. No masses,  No organomegaly. Extremities: no cyanosis or edema. No calf tenderness or cords left. Right LE in splint and metal frame/external fixation; moves toes to command. Feet warm     Pulses: 2+ and symmetric all extremities. Skin: Skin color, texture, turgor normal. No rashes or lesions   Neurologic: CNII-XII intact. Alert and oriented X 3. Fine motor of hands and fingers normal.  No tremor.  equal.  No cogwheeling or rigidity. Gait not tested at this time. Sensation grossly normal to touch except LEs. Gross motor of extremities otherwise normal.       Data Review:   EXAM:  XR TIB/FIB RT, XR ANKLE RT MIN 3 V 3/2/18  INDICATION:  fx.  Right ankle deformity after a fall  COMPARISON: None. FINDINGS: AP and lateral  views of the right tibia and fibula. 3 views of the  right ankle. The patient is status post ORIF of the patella. There is mild medial compartment  osteophytosis in the knee. There is chronic deformity of the fibular neck. There  is a displaced fracture of the medial malleolus. The medial malleolus is 1.5 cm  from the distal tibia. There is a comminuted displaced fracture of the lateral  malleolus at the level of the tibiotalar joint. The talus and distal lateral  malleolus are dislocated laterally by 2.8 cm and slightly retracted. IMPRESSION: Bimalleolar ankle fracture dislocation      Recent Days:  Recent Labs      03/06/18   0432 03/05/18 0028  03/04/18   0334   WBC  6.6  6.2  4.8   HGB  11.4*  10.7*  9.4*   HCT  33.7*  32.1*  27.9*   PLT  158  157  104*     Recent Labs      03/06/18 0432 03/05/18 0028  03/04/18   0334   NA  135*  139  143   K  4.1  4.4  2.9*   CL  97  101  103   CO2  32  32  31   GLU  106*  105*  128*   BUN  12  9  16   CREA  0.81  0.68  0.89   CA  9.2  8.9  8.0*   ALB  3.2*  3.1*  2.8*   TBILI  0.5  0.4  0.2   SGOT  25  24  23   ALT  10*  11*  13     No results for input(s): PH, PCO2, PO2, HCO3, FIO2 in the last 72 hours. 24 Hour Results:  Recent Results (from the past 24 hour(s))   CBC WITH AUTOMATED DIFF    Collection Time: 03/06/18  4:32 AM   Result Value Ref Range    WBC 6.6 3.6 - 11.0 K/uL    RBC 3.58 (L) 3.80 - 5.20 M/uL    HGB 11.4 (L) 11.5 - 16.0 g/dL    HCT 33.7 (L) 35.0 - 47.0 %    MCV 94.1 80.0 - 99.0 FL    MCH 31.8 26.0 - 34.0 PG    MCHC 33.8 30.0 - 36.5 g/dL    RDW 14.0 11.5 - 14.5 %    PLATELET 485 775 - 485 K/uL    MPV 10.1 8.9 - 12.9 FL    NRBC 0.0 0  WBC    ABSOLUTE NRBC 0.00 0.00 - 0.01 K/uL    NEUTROPHILS 40 32 - 75 %    LYMPHOCYTES 46 12 - 49 %    MONOCYTES 10 5 - 13 %    EOSINOPHILS 3 0 - 7 %    BASOPHILS 1 0 - 1 %    IMMATURE GRANULOCYTES 0 0.0 - 0.5 %    ABS. NEUTROPHILS 2.7 1.8 - 8.0 K/UL    ABS. LYMPHOCYTES 3.0 0.8 - 3.5 K/UL    ABS. MONOCYTES 0.6 0.0 - 1.0 K/UL    ABS. EOSINOPHILS 0.2 0.0 - 0.4 K/UL    ABS. BASOPHILS 0.0 0.0 - 0.1 K/UL    ABS. IMM.  GRANS. 0.0 0.00 - 0.04 K/UL    DF AUTOMATED     METABOLIC PANEL, COMPREHENSIVE    Collection Time: 03/06/18  4:32 AM Result Value Ref Range    Sodium 135 (L) 136 - 145 mmol/L    Potassium 4.1 3.5 - 5.1 mmol/L    Chloride 97 97 - 108 mmol/L    CO2 32 21 - 32 mmol/L    Anion gap 6 5 - 15 mmol/L    Glucose 106 (H) 65 - 100 mg/dL    BUN 12 6 - 20 MG/DL    Creatinine 0.81 0.55 - 1.02 MG/DL    BUN/Creatinine ratio 15 12 - 20      GFR est AA >60 >60 ml/min/1.73m2    GFR est non-AA >60 >60 ml/min/1.73m2    Calcium 9.2 8.5 - 10.1 MG/DL    Bilirubin, total 0.5 0.2 - 1.0 MG/DL    ALT (SGPT) 10 (L) 12 - 78 U/L    AST (SGOT) 25 15 - 37 U/L    Alk.  phosphatase 62 45 - 117 U/L    Protein, total 6.9 6.4 - 8.2 g/dL    Albumin 3.2 (L) 3.5 - 5.0 g/dL    Globulin 3.7 2.0 - 4.0 g/dL    A-G Ratio 0.9 (L) 1.1 - 2.2         Medications reviewed  Current Facility-Administered Medications   Medication Dose Route Frequency    cephALEXin (KEFLEX) capsule 500 mg  500 mg Oral Q8H    gabapentin (NEURONTIN) capsule 600 mg  600 mg Oral TID    diphenhydrAMINE (BENADRYL) capsule 25 mg  25 mg Oral Q6H PRN    diphenhydrAMINE (BENADRYL) injection 25 mg  25 mg IntraVENous Q6H PRN    traZODone (DESYREL) tablet 50 mg  50 mg Oral QHS PRN    traZODone (DESYREL) tablet 50 mg  50 mg Oral QHS PRN    hydroCHLOROthiazide (HYDRODIURIL) tablet 25 mg  25 mg Oral DAILY    losartan (COZAAR) tablet 50 mg  50 mg Oral DAILY    amLODIPine (NORVASC) tablet 10 mg  10 mg Oral DAILY    hydrALAZINE (APRESOLINE) 20 mg/mL injection 20 mg  20 mg IntraVENous Q4H PRN    prochlorperazine (COMPAZINE) tablet 10 mg  10 mg Oral Q6H PRN    therapeutic multivitamin (THERAGRAN) tablet 1 Tab  1 Tab Oral DAILY    carvedilol (COREG) tablet 12.5 mg  12.5 mg Oral BID WITH MEALS    DULoxetine (CYMBALTA) capsule 20 mg  20 mg Oral DAILY    levothyroxine (SYNTHROID) tablet 50 mcg  50 mcg Oral ACB    simvastatin (ZOCOR) tablet 20 mg  20 mg Oral DAILY    sodium chloride (NS) flush 5-10 mL  5-10 mL IntraVENous Q8H    sodium chloride (NS) flush 5-10 mL  5-10 mL IntraVENous PRN    heparin (porcine) injection 5,000 Units  5,000 Units SubCUTAneous Q8H    acetaminophen (TYLENOL) tablet 650 mg  650 mg Oral Q4H PRN    oxyCODONE-acetaminophen (PERCOCET 7.5) 7.5-325 mg per tablet 1 Tab  1 Tab Oral Q4H PRN    HYDROmorphone (PF) (DILAUDID) injection 1 mg  1 mg IntraVENous Q4H PRN    naloxone (NARCAN) injection 0.4 mg  0.4 mg IntraVENous PRN    folic acid (FOLVITE) tablet 1 mg  1 mg Oral DAILY    Thiamine Mononitrate (B-1) tablet 100 mg  100 mg Oral DAILY    LORazepam (ATIVAN) injection 1 mg  1 mg IntraVENous Q6H PRN    prochlorperazine (COMPAZINE) injection 5 mg  5 mg IntraVENous Q6H PRN       Care Plan discussed with: Patient/Family and Nurse    Total time spent with patient: 30 minutes.     Maki Goldstein MD

## 2018-03-06 NOTE — PROGRESS NOTES
Problem: Mobility Impaired (Adult and Pediatric)  Goal: *Acute Goals and Plan of Care (Insert Text)  Physical Therapy Goals  Revised 3/6/2018- Goals upgraded as progress noted today  1. Patient will move from supine to sit and sit to supine and scoot up and down  in bed with modified independence within 7 day(s). 2.  Patient will transfer from bed to chair and chair to bed with supervision/set-up with RW, NWB RLE within 7 day(s). 3.  Patient will perform sit <>stand with modified independence within 7 day(s). 4.  Patient will ambulate with supervision/set-up for 10' or less with RW, NWB RLE within 7 day(s). Physical Therapy Goals  Initiated 3/3/2018  1. Patient will move from supine to sit and sit to supine  in bed with supervision within 7 day(s). MET 3/6/18 see above  2. Patient will transfer from bed to chair and chair to bed with minimal assistance using the least restrictive device within 7 day(s). Almost Achieved 3/6/18 see above  3. Patient will perform sit to stand with minimal assistance within 7 day(s). Met 3/6/18- see above  4. Patient will ambulate with minimal assistance for 25 feet with the least restrictive device within 7 day(s). NOT MET- HOLD at this time and Continue per orders transfers only      physical Therapy TREATMENT: REASSESSMENT  Patient: Adina Soler (67 y.o. female)  Date: 3/6/2018  Diagnosis: RIGHT ANKLE FRACTURE  Ankle fracture, right Ankle fracture, right  Procedure(s) (LRB):  ANKLE OPEN REDUCTION INTERNAL FIXATION (Right) 4 Days Post-Op  Precautions: Bed Alarm, Fall, NWB, Skin (NWB RLE)  Chart, physical therapy assessment, plan of care and goals were reviewed. Goals revised today    ASSESSMENT:  Patient calm and cooperative today. Patient agreed to SPTs to various surfaces without protest today which is great improvement from weekend. Her anxiety has been previously so high that patient unable to cooperate with SPT attempts. Discontinued sliding board today.  Patient needed Supervision to SBA for supine>sit, SIT<>STAND with min x 1 and SBA of second, and performed 4 SPTs  Bed chair and BSC with final transfer to recliner chair. Patientr able to ambulate about 4' BSC to chair with min assist x 2. Patient maintained NWB RLE throughout session. Vitals measured throughout session and positive for orthostatic hypotension, not surprising due to essentially in bed majority of last 3 days and using bedpan. After sitting in chair reports improving lightheadedness. Instructed in BLE exercises to be performed in sitting and supine as described lamonte. Will give handouts this pm if time.  present for tx and education regarding NWB, hand placement and technique for safe transfers and short distance amb, and DME needed for home. Advised patient she must transfer and exercise as this will be her circumstance x next 3 months. Patient and  verbalized understanding. Plan was for patient to [facility] to [facility]hab for several days before returning for second ankle surgery ( fusion of right ankle). Spoke with Ortho NP and CM- patient does not have rehab benefits. Discharge to [facility] for short term stay being explored as option by ANJELICA Quiroz. PT revised goals with hopes to make patient independent enough for discharge to home but will need DME and 24/7 supervision and assistance and avoid ETOH. DME needed will be wheelchair with elevating leg rests, RW, BSC, and possible adaptive tools like reacher. They do not have steps to enter one level home. Discussed car transfers. Spoke with NP regarding importance of maintaining NWB RLE. There is concern patient will be non compliant with NWB RLE if she returns direct to home. PT will continue to progress independence. Advised RN to call PT for BTB when patient calls out for BTB. LIBBY Brumfield agreed. Patient on chair alarm and  at bedside.     PRE - In bed with head elevated: 158/92 95% 90bpm  DURING: edge of bed : 128/74 98% 115bpm symptomatic lightheaded but wants to mobilize  Sitting chair: 126/68 98% 108 bpm symptoms resolving    Patient's progression toward goals since last assessment: decreased anxiety, better BP today, calmer demeanor today     PLAN:  Goals have been updated based on progression since last assessment. Patient continues to benefit from skilled intervention to address the above impairments. Continue to follow the patient 1-2 times per day/4-7 days per week to address goals. Planned Interventions:  [x]              Bed Mobility Training             []       Neuromuscular Re-Education  [x]              Transfer Training                   []       Orthotic/Prosthetic Training  [x]              Gait Training                         []       Modalities  [x]              Therapeutic Exercises           []       Edema Management/Control  [x]              Therapeutic Activities            [x]       Patient and Family Training/Education  []              Other (comment):  Discharge Recommendations: Rehab vs  Home Health  Further Equipment Recommendations for Discharge: bedside commode, gait belt, rolling walker and wheelchair 22 inch     SUBJECTIVE:   Patient stated I cant believe this. I am so afraid of falling though.     OBJECTIVE DATA SUMMARY:   Critical Behavior:  Neurologic State: Alert  Orientation Level: Appropriate for age, Oriented X4  Cognition: Appropriate safety awareness, Decreased attention/concentration, Follows commands, Impaired decision making  Safety/Judgement: Awareness of environment, Insight into deficits    Strength:   Strength: Within functional limits (x 4 extremities with exception right ankle/NT)                      Functional Mobility Training:  Bed Mobility:  Rolling:  (NT)  Supine to Sit: Stand-by assistance  Sit to Supine:  (NT to chair)  Scooting: Supervision        Transfers:  Sit to Stand: Minimum assistance; Additional time;Assist x1 (SBA of second person)  Stand to Sit: Minimum assistance; Additional time;Assist x1 (SBA of second person)  Stand Pivot Transfers: Assist x2 (min x 2 and max cueing)                          Balance:  Sitting: Intact; Without support  Standing: Impaired  Standing - Static: Constant support; Fair  Standing - Dynamic : Fair  Ambulation/Gait Training:  Distance (ft): 4 Feet (ft)  Assistive Device: Brace/Splint;Gait belt;Walker, rolling (external fixator to right ankle)  Ambulation - Level of Assistance: Minimal assistance; Additional time;Assist x1  Gait Abnormalities: Antalgic;Decreased step clearance (tends to manuever RW too far ahead)  Right Side Weight Bearing: Non-weight bearing  Left Side Weight Bearing: As tolerated  Base of Support: Shift to left  Speed/Carri: Pace decreased (<100 feet/min); Slow  Step Length: Left shortened  Stairs - Level of Assistance:  (NT)       Therapeutic Exercises: For sitting and supine: ankle pump on left only, Supine -BLEs: SLRs, Gluteal Sets, and knee to chest. Sitting BLEs:  LAQs and Knee Raises/Hip Flexion  Pain: 8/10 - informed RN                    Activity Tolerance:   Fair- see vitals per assessment  Please refer to the flowsheet for vital signs taken during this treatment.   After treatment:   [x]  Patient left in no apparent distress sitting up in chair, LES elevated  []  Patient left in no apparent distress in bed  [x]  Call bell left within reach  [x]  Nursing notified  [x]  Caregiver present  [x]  Alarm activated    COMMUNICATION/COLLABORATION:   The patients plan of care was discussed with: ANJELICA RN and Ortho NP    Alirio Thomas PT   Time Calculation: 60 mins

## 2018-03-06 NOTE — PROGRESS NOTES
Patient requesting information on why she is not taking Letrozole (femara) which she was taking PTA.

## 2018-03-06 NOTE — PROGRESS NOTES
3/6/2018  10:49 AM  CM received denial from [address] of [address] [place] [address] [address] as pt does not have SNF insurance coverage. CM informed attending. CM requested guidance from [facility]. Awaiting response. Attending does not believe pt will be safe returning home.

## 2018-03-06 NOTE — PROGRESS NOTES
Problem: Mobility Impaired (Adult and Pediatric)  Goal: *Acute Goals and Plan of Care (Insert Text)  Physical Therapy Goals  Revised 3/6/2018- Goals upgraded as progress noted today  1. Patient will move from supine to sit and sit to supine and scoot up and down  in bed with modified independence within 7 day(s). 2.  Patient will transfer from bed to chair and chair to bed with supervision/set-up with RW, NWB RLE within 7 day(s). 3.  Patient will perform sit <>stand with modified independence within 7 day(s). 4.  Patient will ambulate with supervision/set-up for 10' or less with RW, NWB RLE within 7 day(s). Physical Therapy Goals  Initiated 3/3/2018  1. Patient will move from supine to sit and sit to supine  in bed with supervision within 7 day(s). MET 3/6/18 see above  2. Patient will transfer from bed to chair and chair to bed with minimal assistance using the least restrictive device within 7 day(s). Almost Achieved 3/6/18 see above  3. Patient will perform sit to stand with minimal assistance within 7 day(s). Met 3/6/18- see above  4. Patient will ambulate with minimal assistance for 25 feet with the least restrictive device within 7 day(s). NOT MET- HOLD at this time and Continue per orders transfers only       physical Therapy TREATMENT  Patient: Best Garcia (85 y.o. female)  Date: 3/6/2018  Diagnosis: RIGHT ANKLE FRACTURE  Ankle fracture, right Ankle fracture, right  Procedure(s) (LRB):  ANKLE OPEN REDUCTION INTERNAL FIXATION (Right) 4 Days Post-Op  Precautions: Bed Alarm, Fall, NWB, Skin (NWB RLE)  Chart, physical therapy assessment, plan of care and goals were reviewed. ASSESSMENT:  Patient tolerated sitting x 2.25 hours in recliner and ready for back to bed. Patient needed CG x 2 for stand pivot transfer back to bed and then SBA for sit to supine. Noted yellowish drainage posterior heel. Patient voiced several concerns regarding discharge plans.  Patient wishes for PT to write out sequence for stand pivot transfers. Will also provide handouts for exercises. Will continue to follow BID as tolerated. Spoke with patient and her  as well as CM and NP regarding discharge plan. Progression toward goals:  [x]    Improving appropriately and progressing toward goals  []    Improving slowly and progressing toward goals  []    Not making progress toward goals and plan of care will be adjusted     PLAN:  Patient continues to benefit from skilled intervention to address the above impairments. Continue treatment per established plan of care. Discharge Recommendations:  Continued hospitalization until 2nd ankle surgery [address]  Further Equipment Recommendations for Discharge:  bedside commode, rolling walker and wheelchair 22 inch     SUBJECTIVE:   Patient stated I just don't see how I can manage at home.     OBJECTIVE DATA SUMMARY:   Critical Behavior:  Neurologic State: Alert  Orientation Level: Appropriate for age, Oriented X4  Cognition: Appropriate safety awareness, Decreased attention/concentration, Follows commands, Impaired decision making  Safety/Judgement: Awareness of environment, Insight into deficits  Functional Mobility Training:  Bed Mobility:  Rolling:  (NT)  Supine to Sit: Stand-by assistance  Sit to Supine: Stand-by assistance  Scooting: Supervision        Transfers:  Sit to Stand: Minimum assistance; Additional time;Assist x1 (SBA of second person)  Stand to Sit: Minimum assistance; Additional time;Assist x1 (SBA of second person)  Stand Pivot Transfers: Assist x2 (min x 2 and max cueing)                          Balance:  Sitting: Intact; Without support  Standing: Impaired  Standing - Static: Constant support; Fair  Standing - Dynamic : Fair  Ambulation/Gait Training:  Distance (ft): 4 Feet (ft)  Assistive Device: Brace/Splint;Gait belt;Walker, rolling (external fixator to right ankle)  Ambulation - Level of Assistance: Minimal assistance; Additional time;Assist x1  Gait Abnormalities: Antalgic;Decreased step clearance (tends to manuever RW too far ahead)  Right Side Weight Bearing: Non-weight bearing  Left Side Weight Bearing: As tolerated  Base of Support: Shift to left  Speed/Carri: Pace decreased (<100 feet/min); Slow  Step Length: Left shortened  Stairs - Level of Assistance:  (NT)        Therapeutic Exercises:   See previous note  Pain: 3/10   Activity Tolerance: fair    Please refer to the flowsheet for vital signs taken during this treatment.   After treatment:   []    Patient left in no apparent distress sitting up in chair  [x]    Patient left in no apparent distress in bed  [x]    Call bell left within reach  [x]    Nursing notified  [x]    Caregiver present  [x]    Bed alarm activated    COMMUNICATION/COLLABORATION:   The patients plan of care was discussed with: Registered Nurse and     Nguyen Marx PT   Time Calculation: 25 mins

## 2018-03-06 NOTE — ROUTINE PROCESS
Bedside shift change report given to Dick Torres RN (oncoming nurse) by Bruce Gilmore RN (offgoing nurse). Report included the following information SBAR, Kardex, Procedure Summary, Intake/Output and Recent Results.

## 2018-03-06 NOTE — PROGRESS NOTES
Bedside and Verbal shift change report given to Obi Bello RN (oncoming nurse) by Mitesh Dickinson RN (offgoing nurse). Report included the following information SBAR, Kardex, OR Summary, Procedure Summary, Intake/Output, MAR and Recent Results.

## 2018-03-07 LAB
ALBUMIN SERPL-MCNC: 2.9 G/DL (ref 3.5–5)
ALBUMIN/GLOB SERPL: 0.8 {RATIO} (ref 1.1–2.2)
ALP SERPL-CCNC: 56 U/L (ref 45–117)
ALT SERPL-CCNC: 13 U/L (ref 12–78)
ANION GAP SERPL CALC-SCNC: 7 MMOL/L (ref 5–15)
AST SERPL-CCNC: 24 U/L (ref 15–37)
BASOPHILS # BLD: 0 K/UL (ref 0–0.1)
BASOPHILS NFR BLD: 1 % (ref 0–1)
BILIRUB SERPL-MCNC: 0.3 MG/DL (ref 0.2–1)
BUN SERPL-MCNC: 18 MG/DL (ref 6–20)
BUN/CREAT SERPL: 21 (ref 12–20)
CALCIUM SERPL-MCNC: 8.8 MG/DL (ref 8.5–10.1)
CHLORIDE SERPL-SCNC: 99 MMOL/L (ref 97–108)
CO2 SERPL-SCNC: 30 MMOL/L (ref 21–32)
CREAT SERPL-MCNC: 0.85 MG/DL (ref 0.55–1.02)
DIFFERENTIAL METHOD BLD: ABNORMAL
EOSINOPHIL # BLD: 0.2 K/UL (ref 0–0.4)
EOSINOPHIL NFR BLD: 2 % (ref 0–7)
ERYTHROCYTE [DISTWIDTH] IN BLOOD BY AUTOMATED COUNT: 14 % (ref 11.5–14.5)
GLOBULIN SER CALC-MCNC: 3.5 G/DL (ref 2–4)
GLUCOSE SERPL-MCNC: 110 MG/DL (ref 65–100)
HCT VFR BLD AUTO: 30.8 % (ref 35–47)
HGB BLD-MCNC: 10.2 G/DL (ref 11.5–16)
IMM GRANULOCYTES # BLD: 0 K/UL (ref 0–0.04)
IMM GRANULOCYTES NFR BLD AUTO: 0 % (ref 0–0.5)
LYMPHOCYTES # BLD: 2.8 K/UL (ref 0.8–3.5)
LYMPHOCYTES NFR BLD: 44 % (ref 12–49)
MCH RBC QN AUTO: 31.5 PG (ref 26–34)
MCHC RBC AUTO-ENTMCNC: 33.1 G/DL (ref 30–36.5)
MCV RBC AUTO: 95.1 FL (ref 80–99)
MONOCYTES # BLD: 0.8 K/UL (ref 0–1)
MONOCYTES NFR BLD: 12 % (ref 5–13)
NEUTS SEG # BLD: 2.7 K/UL (ref 1.8–8)
NEUTS SEG NFR BLD: 42 % (ref 32–75)
NRBC # BLD: 0 K/UL (ref 0–0.01)
NRBC BLD-RTO: 0 PER 100 WBC
PLATELET # BLD AUTO: 167 K/UL (ref 150–400)
PMV BLD AUTO: 10 FL (ref 8.9–12.9)
POTASSIUM SERPL-SCNC: 3.3 MMOL/L (ref 3.5–5.1)
PROT SERPL-MCNC: 6.4 G/DL (ref 6.4–8.2)
RBC # BLD AUTO: 3.24 M/UL (ref 3.8–5.2)
SODIUM SERPL-SCNC: 136 MMOL/L (ref 136–145)
WBC # BLD AUTO: 6.4 K/UL (ref 3.6–11)

## 2018-03-07 PROCEDURE — 65270000029 HC RM PRIVATE

## 2018-03-07 PROCEDURE — 77030038269 HC DRN EXT URIN PURWCK BARD -A

## 2018-03-07 PROCEDURE — 74011250636 HC RX REV CODE- 250/636: Performed by: FAMILY MEDICINE

## 2018-03-07 PROCEDURE — 74011250637 HC RX REV CODE- 250/637: Performed by: FAMILY MEDICINE

## 2018-03-07 PROCEDURE — 36415 COLL VENOUS BLD VENIPUNCTURE: CPT | Performed by: FAMILY MEDICINE

## 2018-03-07 PROCEDURE — 85025 COMPLETE CBC W/AUTO DIFF WBC: CPT | Performed by: FAMILY MEDICINE

## 2018-03-07 PROCEDURE — 80053 COMPREHEN METABOLIC PANEL: CPT | Performed by: FAMILY MEDICINE

## 2018-03-07 PROCEDURE — 97530 THERAPEUTIC ACTIVITIES: CPT

## 2018-03-07 PROCEDURE — 74011250637 HC RX REV CODE- 250/637: Performed by: ORTHOPAEDIC SURGERY

## 2018-03-07 PROCEDURE — 74011250636 HC RX REV CODE- 250/636: Performed by: ORTHOPAEDIC SURGERY

## 2018-03-07 PROCEDURE — 74011250637 HC RX REV CODE- 250/637: Performed by: PHYSICIAN ASSISTANT

## 2018-03-07 RX ORDER — POTASSIUM CHLORIDE 750 MG/1
20 TABLET, FILM COATED, EXTENDED RELEASE ORAL 2 TIMES DAILY
Status: COMPLETED | OUTPATIENT
Start: 2018-03-07 | End: 2018-03-08

## 2018-03-07 RX ADMIN — CARVEDILOL 12.5 MG: 12.5 TABLET, FILM COATED ORAL at 16:39

## 2018-03-07 RX ADMIN — DULOXETINE HYDROCHLORIDE 20 MG: 20 CAPSULE, DELAYED RELEASE ORAL at 09:06

## 2018-03-07 RX ADMIN — Medication 10 ML: at 05:10

## 2018-03-07 RX ADMIN — LORAZEPAM 1 MG: 2 INJECTION INTRAMUSCULAR; INTRAVENOUS at 09:06

## 2018-03-07 RX ADMIN — CEPHALEXIN 500 MG: 250 CAPSULE ORAL at 16:39

## 2018-03-07 RX ADMIN — LORAZEPAM 1 MG: 2 INJECTION INTRAMUSCULAR; INTRAVENOUS at 23:01

## 2018-03-07 RX ADMIN — Medication 10 ML: at 21:07

## 2018-03-07 RX ADMIN — GABAPENTIN 600 MG: 300 CAPSULE ORAL at 21:07

## 2018-03-07 RX ADMIN — OXYCODONE HYDROCHLORIDE AND ACETAMINOPHEN 1 TABLET: 7.5; 325 TABLET ORAL at 12:49

## 2018-03-07 RX ADMIN — GABAPENTIN 600 MG: 300 CAPSULE ORAL at 08:59

## 2018-03-07 RX ADMIN — LORAZEPAM 1 MG: 2 INJECTION INTRAMUSCULAR; INTRAVENOUS at 16:39

## 2018-03-07 RX ADMIN — LETROZOLE 2.5 MG: 2.5 TABLET, FILM COATED ORAL at 17:01

## 2018-03-07 RX ADMIN — THERA TABS 1 TABLET: TAB at 09:00

## 2018-03-07 RX ADMIN — Medication 100 MG: at 09:00

## 2018-03-07 RX ADMIN — CEPHALEXIN 500 MG: 250 CAPSULE ORAL at 04:41

## 2018-03-07 RX ADMIN — POTASSIUM CHLORIDE 20 MEQ: 750 TABLET, FILM COATED, EXTENDED RELEASE ORAL at 09:00

## 2018-03-07 RX ADMIN — CEPHALEXIN 500 MG: 250 CAPSULE ORAL at 21:07

## 2018-03-07 RX ADMIN — LORAZEPAM 1 MG: 2 INJECTION INTRAMUSCULAR; INTRAVENOUS at 01:19

## 2018-03-07 RX ADMIN — POTASSIUM CHLORIDE 20 MEQ: 750 TABLET, FILM COATED, EXTENDED RELEASE ORAL at 17:05

## 2018-03-07 RX ADMIN — LEVOTHYROXINE SODIUM 50 MCG: 50 TABLET ORAL at 04:41

## 2018-03-07 RX ADMIN — AMLODIPINE BESYLATE 10 MG: 5 TABLET ORAL at 09:00

## 2018-03-07 RX ADMIN — GABAPENTIN 600 MG: 300 CAPSULE ORAL at 16:39

## 2018-03-07 RX ADMIN — FOLIC ACID 1 MG: 1 TABLET ORAL at 09:00

## 2018-03-07 RX ADMIN — LOSARTAN POTASSIUM 50 MG: 50 TABLET ORAL at 09:06

## 2018-03-07 RX ADMIN — SIMVASTATIN 20 MG: 20 TABLET, FILM COATED ORAL at 17:05

## 2018-03-07 RX ADMIN — OXYCODONE HYDROCHLORIDE AND ACETAMINOPHEN 1 TABLET: 7.5; 325 TABLET ORAL at 04:41

## 2018-03-07 RX ADMIN — HEPARIN SODIUM 5000 UNITS: 5000 INJECTION, SOLUTION INTRAVENOUS; SUBCUTANEOUS at 16:39

## 2018-03-07 RX ADMIN — HEPARIN SODIUM 5000 UNITS: 5000 INJECTION, SOLUTION INTRAVENOUS; SUBCUTANEOUS at 04:41

## 2018-03-07 RX ADMIN — OXYCODONE HYDROCHLORIDE AND ACETAMINOPHEN 1 TABLET: 7.5; 325 TABLET ORAL at 21:07

## 2018-03-07 RX ADMIN — Medication 10 ML: at 16:40

## 2018-03-07 RX ADMIN — OXYCODONE HYDROCHLORIDE AND ACETAMINOPHEN 1 TABLET: 7.5; 325 TABLET ORAL at 16:39

## 2018-03-07 RX ADMIN — OXYCODONE HYDROCHLORIDE AND ACETAMINOPHEN 1 TABLET: 7.5; 325 TABLET ORAL at 09:00

## 2018-03-07 RX ADMIN — HEPARIN SODIUM 5000 UNITS: 5000 INJECTION, SOLUTION INTRAVENOUS; SUBCUTANEOUS at 21:07

## 2018-03-07 RX ADMIN — HYDROCHLOROTHIAZIDE 25 MG: 25 TABLET ORAL at 09:00

## 2018-03-07 RX ADMIN — CARVEDILOL 12.5 MG: 12.5 TABLET, FILM COATED ORAL at 09:06

## 2018-03-07 NOTE — PROGRESS NOTES
3/7/2018  4:19 PM  CM consulted with attending and CM supervisor. Pt will stay until her surgery unless otherwise indicated. CM requested APA see pt.

## 2018-03-07 NOTE — PROGRESS NOTES
Family Practice Consult and Daily Progress Note: 3/7/2018  Rodrigo Newman MD    Assessment/Plan:   MARYJO (acute kidney injury) [facility]) (3/3/2018) vrs some degree of CKD -  likely due to volume depletion. Had vomiting, diuretic use. Holding Hydrodiuril and ARB. Start IVFs and follow renal function     Hypokalemia due to loss of potassium (3/3/2018): from diuretic use. DC hydrodiuril. Replete and follow K+     HTN (hypertension), benign (3/3/2018): Continue Coreg. Resumed Losarten and HCTZ. Added Amlodipine.      Depression with anxiety (3/3/2018): resume Cymbalta     Dyslipidemia (3/3/2018): resume simvastatin      Ankle fracture, right (3/2/2018): ortho is attending. Per ortho.  Surgery planned for next Wednesday    Hx of ETOH abuse - Had to start CIWA protocol for a short time- resolved    Alcohol withdrawal as evidenced by confusion & agitation treated with IV Ativan 2mg and placed on CIWA protocol - resolved        Problem List:  Problem List as of 3/7/2018  Date Reviewed: 8/24/2016          Codes Class Noted - Resolved    MARYJO (acute kidney injury) ([facility] [address] ICD-10-CM: N17.9  ICD-9-CM: 584.9  3/3/2018 - Present        Hypokalemia due to loss of potassium ICD-10-CM: E87.6  ICD-9-CM: 276.8  3/3/2018 - Present        HTN (hypertension), benign ICD-10-CM: I10  ICD-9-CM: 401.1  3/3/2018 - Present        Depression with anxiety ICD-10-CM: F41.8  ICD-9-CM: 300.4  3/3/2018 - Present        Dyslipidemia ICD-10-CM: E78.5  ICD-9-CM: 272.4  3/3/2018 - Present        * (Principal)Ankle fracture, right ICD-10-CM: H73.159O  ICD-9-CM: 824.8  3/2/2018 - Present        Numbness and tingling of both legs below knees ICD-10-CM: R20.0, R20.2  ICD-9-CM: 782.0  11/30/2015 - Present        Numbness in feet ICD-10-CM: R20.0  ICD-9-CM: 782.0  10/23/2015 - Present        Burning sensation of feet ICD-10-CM: R20.8  ICD-9-CM: 782.0  10/23/2015 - Present        SAH (subarachnoid hemorrhage) ([facility] ICD-10-CM: I60.9  ICD-9-CM: 430  10/23/2015 - Present              Subjective:    62 yo WF, pt Dr Rodney Person in our group, admitted after fall and severe fx of right ankle requiring 2 stage surg repair, first repair done 3/2/18. Pt reports no syncope or dizziness prior to fall and no prodrome events. She did walk on the foot for 2 days post fall and had little pain due to her chronic LE neuropathy. We are asked to see her for her medical problems including elevated creat. She does report marked decrease in intake for 2 days since her fall. She has a hx of HTN which has been relatively well controlled outpt. She also has a hx hypothyroidism, LE neuropathy, depression with anxiety and hypothyroidism and reports she is compliant with her medications. In addition, she reports Alcoholism and [facility]. \"  She is going to call her sponsor today as she is feeling additional stress from the fall and surg. Currently she is stable for follow up surg. 3/4: Had more agitation and confusion last night and had to start CIWA protocol. She is better this am but still having problems with word retrieval. She reports to me that her last drink was Monday but told Dr Adam Sparks it was months ago. Potassium still low at 2.9. Will replete with po.     3/5:  Agitation and confusion have resolved. K+ back up after repletion. Discussed with ortho. 3/6:  Feeling better this AM.  BP spiked last night and Amlodipine added and resumed her usual BP meds - BP now better. No further signs of ETOH withdrawal.      3/7: BP is much better. She slept well last night. No signs of withdrawal. Case management working on placement until her surgery. She is not safe to go home. Potassium a little low.      Past Medical History:   Diagnosis Date    Alcohol abuse     Anxiety     Depression     Fatigue     Hypertension     Memory loss     Vertigo     Visual disturbance      Past Surgical History:   Procedure Laterality Date  HX ANKLE FRACTURE TX Right 12/2016    HX APPENDECTOMY      HX ORTHOPAEDIC      right knee repair    HX OTHER SURGICAL  04/2016    double mastectomy     HX OTHER SURGICAL  06/2016    reconstructive surgery on her breasts    HX SHOULDER REPLACEMENT Right 11/2016    HX WRIST FRACTURE TX Left 12/2016     Social History     Social History    Marital status:      Spouse name: N/A    Number of children: N/A    Years of education: N/A     Occupational History    Not on file. Social History Main Topics    Smoking status: Never Smoker    Smokeless tobacco: Never Used    Alcohol use 21.0 oz/week     35 Glasses of wine per week      Comment: last drink was 2/27    Drug use: Not on file    Sexual activity: Not on file     Other Topics Concern    Not on file     Social History Narrative     Family History   Problem Relation Age of Onset    Cancer Mother     Other Father      Allergies   Allergen Reactions    Sulfa (Sulfonamide Antibiotics) Swelling and Other (comments)     Flushing and hot feeling   Facial swelling       Review of Systems:     Constitutional ROS: no fever, chills, rigors or night sweats; + for fatigue  Respiratory ROS: no cough, sputum, hemoptysis, dyspnea or pleuritic pain. Cardiovascular ROS: no chest pain, palpitation, orthopnea, PND or syncope  Endocrine ROS: no polydispsia, polyuria, heat or cold intolerance   Gastrointestinal ROS: no dysphagia, abdominal pain, nausea, vomiting, diarrhea or bleeding. Genito-Urinary ROS: no dysuria, frequency, hematuria, retention, or flank pain  Musculoskeletal ROS: right ankle pain. Neurological ROS: no headache, confusion, diplopia, focal symptoms, tremor, or hallucinosis.  LE neuropathy unchanged  Psychiatric ROS: ROS: feeling mod depression vrs sadness due to injury/fall she reports, no current anxiety, mood swings  Dermatological ROS: no rash, pruritis, or urticaria    Objective:   Physical Exam:     Visit Vitals    /71    Pulse 77    Temp 98.1 °F (36.7 °C)    Resp 18    Ht 5' 7\" (1.702 m)    Wt 155 lb (70.3 kg)    SpO2 92%    Breastfeeding No    BMI 24.28 kg/m2    O2 Flow Rate (L/min): 1.5 l/min O2 Device: Room air    Temp (24hrs), Av.2 °F (36.8 °C), Min:97.4 °F (36.3 °C), Max:99.3 °F (37.4 °C)         190 -  0700  In: -   Out: 1700 [Urine:1700]    General:  Alert, cooperative, no distress, appears stated age. Head:  Normocephalic, without obvious abnormality, atraumatic. Eyes:  Conjunctivae/corneas clear. PERRL, EOMs intact. Nose: Nares normal. Septum midline. Mucosa normal. No drainage or sinus tenderness. Throat: Lips, mucosa, and tongue moist..   Neck: Supple, symmetrical, trachea midline, no adenopathy, thyroid: no enlargement/tenderness/nodules, no carotid bruit and no JVD. Back:   Symmetric, no curvature. ROM normal. No CVA tenderness. Lungs:   Clear to auscultation bilaterally. Chest wall:  No tenderness or deformity. Heart:  Regular rate and rhythm, S1, S2 normal, no murmur, click, rub or gallop. Abdomen:   Soft, non-tender. Bowel sounds normal. No masses,  No organomegaly. Extremities: no cyanosis or edema. No calf tenderness or cords left. Right LE in splint and metal frame/external fixation; moves toes to command. Feet warm     Pulses: 2+ and symmetric all extremities. Skin: Skin color, texture, turgor normal. No rashes or lesions   Neurologic: CNII-XII intact. Alert and oriented X 3. Fine motor of hands and fingers normal.  No tremor.  equal.  No cogwheeling or rigidity. Gait not tested at this time. Sensation grossly normal to touch except LEs. Gross motor of extremities otherwise normal.       Data Review:   EXAM:  XR TIB/FIB RT, XR ANKLE RT MIN 3 V 3/2/18  INDICATION:  fx.  Right ankle deformity after a fall  COMPARISON: None. FINDINGS: AP and lateral  views of the right tibia and fibula. 3 views of the  right ankle.    The patient is status post ORIF of the patella. There is mild medial compartment  osteophytosis in the knee. There is chronic deformity of the fibular neck. There  is a displaced fracture of the medial malleolus. The medial malleolus is 1.5 cm  from the distal tibia. There is a comminuted displaced fracture of the lateral  malleolus at the level of the tibiotalar joint. The talus and distal lateral  malleolus are dislocated laterally by 2.8 cm and slightly retracted. IMPRESSION: Bimalleolar ankle fracture dislocation      Recent Days:  Recent Labs      03/07/18   0118  03/06/18   0432  03/05/18   0028   WBC  6.4  6.6  6.2   HGB  10.2*  11.4*  10.7*   HCT  30.8*  33.7*  32.1*   PLT  167  158  157     Recent Labs      03/07/18   0118  03/06/18 0432  03/05/18   0028   NA  136  135*  139   K  3.3*  4.1  4.4   CL  99  97  101   CO2  30  32  32   GLU  110*  106*  105*   BUN  18  12  9   CREA  0.85  0.81  0.68   CA  8.8  9.2  8.9   ALB  2.9*  3.2*  3.1*   TBILI  0.3  0.5  0.4   SGOT  24  25  24   ALT  13  10*  11*     No results for input(s): PH, PCO2, PO2, HCO3, FIO2 in the last 72 hours. 24 Hour Results:  Recent Results (from the past 24 hour(s))   CBC WITH AUTOMATED DIFF    Collection Time: 03/07/18  1:18 AM   Result Value Ref Range    WBC 6.4 3.6 - 11.0 K/uL    RBC 3.24 (L) 3.80 - 5.20 M/uL    HGB 10.2 (L) 11.5 - 16.0 g/dL    HCT 30.8 (L) 35.0 - 47.0 %    MCV 95.1 80.0 - 99.0 FL    MCH 31.5 26.0 - 34.0 PG    MCHC 33.1 30.0 - 36.5 g/dL    RDW 14.0 11.5 - 14.5 %    PLATELET 561 183 - 955 K/uL    MPV 10.0 8.9 - 12.9 FL    NRBC 0.0 0  WBC    ABSOLUTE NRBC 0.00 0.00 - 0.01 K/uL    NEUTROPHILS 42 32 - 75 %    LYMPHOCYTES 44 12 - 49 %    MONOCYTES 12 5 - 13 %    EOSINOPHILS 2 0 - 7 %    BASOPHILS 1 0 - 1 %    IMMATURE GRANULOCYTES 0 0.0 - 0.5 %    ABS. NEUTROPHILS 2.7 1.8 - 8.0 K/UL    ABS. LYMPHOCYTES 2.8 0.8 - 3.5 K/UL    ABS. MONOCYTES 0.8 0.0 - 1.0 K/UL    ABS. EOSINOPHILS 0.2 0.0 - 0.4 K/UL    ABS. BASOPHILS 0.0 0.0 - 0.1 K/UL    ABS. IMM. GRANS. 0.0 0.00 - 0.04 K/UL    DF AUTOMATED     METABOLIC PANEL, COMPREHENSIVE    Collection Time: 03/07/18  1:18 AM   Result Value Ref Range    Sodium 136 136 - 145 mmol/L    Potassium 3.3 (L) 3.5 - 5.1 mmol/L    Chloride 99 97 - 108 mmol/L    CO2 30 21 - 32 mmol/L    Anion gap 7 5 - 15 mmol/L    Glucose 110 (H) 65 - 100 mg/dL    BUN 18 6 - 20 MG/DL    Creatinine 0.85 0.55 - 1.02 MG/DL    BUN/Creatinine ratio 21 (H) 12 - 20      GFR est AA >60 >60 ml/min/1.73m2    GFR est non-AA >60 >60 ml/min/1.73m2    Calcium 8.8 8.5 - 10.1 MG/DL    Bilirubin, total 0.3 0.2 - 1.0 MG/DL    ALT (SGPT) 13 12 - 78 U/L    AST (SGOT) 24 15 - 37 U/L    Alk.  phosphatase 56 45 - 117 U/L    Protein, total 6.4 6.4 - 8.2 g/dL    Albumin 2.9 (L) 3.5 - 5.0 g/dL    Globulin 3.5 2.0 - 4.0 g/dL    A-G Ratio 0.8 (L) 1.1 - 2.2         Medications reviewed  Current Facility-Administered Medications   Medication Dose Route Frequency    letrozole (FEMARA) tablet 2.5 mg  2.5 mg Oral DAILY    cephALEXin (KEFLEX) capsule 500 mg  500 mg Oral Q8H    gabapentin (NEURONTIN) capsule 600 mg  600 mg Oral TID    diphenhydrAMINE (BENADRYL) capsule 25 mg  25 mg Oral Q6H PRN    diphenhydrAMINE (BENADRYL) injection 25 mg  25 mg IntraVENous Q6H PRN    traZODone (DESYREL) tablet 50 mg  50 mg Oral QHS PRN    traZODone (DESYREL) tablet 50 mg  50 mg Oral QHS PRN    hydroCHLOROthiazide (HYDRODIURIL) tablet 25 mg  25 mg Oral DAILY    losartan (COZAAR) tablet 50 mg  50 mg Oral DAILY    amLODIPine (NORVASC) tablet 10 mg  10 mg Oral DAILY    hydrALAZINE (APRESOLINE) 20 mg/mL injection 20 mg  20 mg IntraVENous Q4H PRN    prochlorperazine (COMPAZINE) tablet 10 mg  10 mg Oral Q6H PRN    therapeutic multivitamin (THERAGRAN) tablet 1 Tab  1 Tab Oral DAILY    carvedilol (COREG) tablet 12.5 mg  12.5 mg Oral BID WITH MEALS    DULoxetine (CYMBALTA) capsule 20 mg  20 mg Oral DAILY    levothyroxine (SYNTHROID) tablet 50 mcg  50 mcg Oral ACB    simvastatin (ZOCOR) tablet 20 mg 20 mg Oral DAILY    sodium chloride (NS) flush 5-10 mL  5-10 mL IntraVENous Q8H    sodium chloride (NS) flush 5-10 mL  5-10 mL IntraVENous PRN    heparin (porcine) injection 5,000 Units  5,000 Units SubCUTAneous Q8H    acetaminophen (TYLENOL) tablet 650 mg  650 mg Oral Q4H PRN    oxyCODONE-acetaminophen (PERCOCET 7.5) 7.5-325 mg per tablet 1 Tab  1 Tab Oral Q4H PRN    HYDROmorphone (PF) (DILAUDID) injection 1 mg  1 mg IntraVENous Q4H PRN    naloxone (NARCAN) injection 0.4 mg  0.4 mg IntraVENous PRN    folic acid (FOLVITE) tablet 1 mg  1 mg Oral DAILY    Thiamine Mononitrate (B-1) tablet 100 mg  100 mg Oral DAILY    LORazepam (ATIVAN) injection 1 mg  1 mg IntraVENous Q6H PRN    prochlorperazine (COMPAZINE) injection 5 mg  5 mg IntraVENous Q6H PRN       Care Plan discussed with: Patient/Family and Nurse    Total time spent with patient: 30 minutes.     Brook Purvis MD

## 2018-03-07 NOTE — PROGRESS NOTES
Problem: Falls - Risk of  Goal: *Absence of Falls  Document Sherrie Fall Risk and appropriate interventions in the flowsheet.    Outcome: Progressing Towards Goal  Fall Risk Interventions:  Mobility Interventions: Patient to call before getting OOB    Mentation Interventions: Bed/chair exit alarm, Eyeglasses and hearing aids    Medication Interventions: Patient to call before getting OOB, Teach patient to arise slowly    Elimination Interventions: Call light in reach    History of Falls Interventions: Bed/chair exit alarm

## 2018-03-07 NOTE — PROGRESS NOTES
Problem: Mobility Impaired (Adult and Pediatric)  Goal: *Acute Goals and Plan of Care (Insert Text)  Physical Therapy Goals  Revised 3/6/2018- Goals upgraded as progress noted today  1. Patient will move from supine to sit and sit to supine and scoot up and down  in bed with modified independence within 7 day(s). 2.  Patient will transfer from bed to chair and chair to bed with supervision/set-up with RW, NWB RLE within 7 day(s). 3.  Patient will perform sit <>stand with modified independence within 7 day(s). 4.  Patient will ambulate with supervision/set-up for 10' or less with RW, NWB RLE within 7 day(s). Physical Therapy Goals  Initiated 3/3/2018  1. Patient will move from supine to sit and sit to supine  in bed with supervision within 7 day(s). MET 3/6/18 see above  2. Patient will transfer from bed to chair and chair to bed with minimal assistance using the least restrictive device within 7 day(s). Almost Achieved 3/6/18 see above  3. Patient will perform sit to stand with minimal assistance within 7 day(s). Met 3/6/18- see above  4. Patient will ambulate with minimal assistance for 25 feet with the least restrictive device within 7 day(s). NOT MET- HOLD at this time and Continue per orders transfers only       physical Therapy TREATMENT  Patient: Марина Waddell (10 y.o. female)  Date: 3/7/2018  Diagnosis: RIGHT ANKLE FRACTURE  Ankle fracture, right Ankle fracture, right  Procedure(s) (LRB):  ANKLE OPEN REDUCTION INTERNAL FIXATION (Right) 5 Days Post-Op  Precautions: Bed Alarm, Fall, NWB  Chart, physical therapy assessment, plan of care and goals were reviewed. ASSESSMENT: seen BID - pm tx  Patient ready for PT for assist to [facility] and then transfer back to bed. She tolerated sitting in chair x 2.5 hours. Patient able to perform sit<>stand and SPTs with slightly greater ease today and maintains NWB RLE with cues. High anxiety and tends to panic so needs step by step cues.   Patient up to [facility] and then another pivot back to bed. Patient forgetting sequence nearly everytime for hand placement and safe transfer technique. Reviewed with repetition and demonstration again today (also 3 x yesterday). Will try and type out for her and pull pictures. Patient's CM has confirmed patient will remain at [facility] until next ankle surgery on 3/14/18. Patient sun downs, has periods of confusion, forgetful, and concern she will go home and drink ETOH and WB RLE or Fall. Fall or Weight bearing on RLE would be devastating for patient. Recommend patient continue OOB to commode and chair with 2 person assist and use of gait belt - sign posted in room to alert all staff of importance. Progression toward goals:  [x]    Improving appropriately and progressing toward goals  []    Improving slowly and progressing toward goals  []    Not making progress toward goals and plan of care will be adjusted     PLAN:  Patient continues to benefit from skilled intervention to address the above impairments. Continue treatment per established plan of care. Discharge Recommendations:  Home Health  Further Equipment Recommendations for Discharge: bedside commode, rolling walker and wheelchair 22 inch with elevating legrests- placed another request for,3/13 delivery to hospital room       SUBJECTIVE:   Patient stated Its embarassing Patient crying about needing help for hygiene after BMs    OBJECTIVE DATA SUMMARY:   Critical Behavior:  Neurologic State: Alert  Orientation Level: Appropriate for age, Oriented X4  Cognition: Appropriate decision making, Appropriate for age attention/concentration, Appropriate safety awareness, Follows commands  Safety/Judgement: Awareness of environment, Insight into deficits  Functional Mobility Training:  Bed Mobility:     Supine to Sit: Stand-by assistance     Scooting: Stand-by assistance        Transfers:  Sit to Stand: Minimum assistance; Additional time;Assist x1  Stand to Sit: Minimum assistance; Additional time;Assist x1  Stand Pivot Transfers: Assist x2     Bed to Chair: Moderate assistance; Additional time;Assist x2                    Balance:  Sitting: Intact; Without support  Standing - Static: Constant support; Fair  Standing - Dynamic : Poor  Ambulation/Gait Training:  Distance (ft):  (8 steps)  Assistive Device: Brace/Splint;Gait belt;Walker, rolling  Ambulation - Level of Assistance: Minimal assistance; Moderate assistance; Additional time;Assist x1        Gait Abnormalities: Antalgic  Right Side Weight Bearing: Non-weight bearing (external fixator)     Base of Support: Shift to left     Speed/Carri: Pace decreased (<100 feet/min)  Step Length: Left shortened;Right shortened             Stairs - Level of Assistance:  (NT and not applicable)        Therapeutic Exercises:   Handouts given for AP left, Sit: knee raises and LAQs bilaterally, Supine : SLRs, Knee to chest  Pain:  Pain Scale 1: Numeric (0 - 10)  Pain Intensity 1: 6  Pain Location 1: Head  Pain Orientation 1: Anterior  Pain Description 1: Aching  Pain Intervention(s) 1: Medication (see MAR)  Activity Tolerance:   Poor- high anxiety  Please refer to the flowsheet for vital signs taken during this treatment.   After treatment:   []    Patient left in no apparent distress sitting up in chair  [x]    Patient left in no apparent distress in bed  []    Call bell left within reach  [x]    Nursing notified  []    Caregiver present  [x]    Bed alarm activated    COMMUNICATION/COLLABORATION:   The patients plan of care was discussed with: Registered Nurse    Karen Benson, PT   Time Calculation: 30 mins

## 2018-03-07 NOTE — PROGRESS NOTES
Problem: Mobility Impaired (Adult and Pediatric)  Goal: *Acute Goals and Plan of Care (Insert Text)  Physical Therapy Goals  Revised 3/6/2018- Goals upgraded as progress noted today  1. Patient will move from supine to sit and sit to supine and scoot up and down  in bed with modified independence within 7 day(s). 2.  Patient will transfer from bed to chair and chair to bed with supervision/set-up with RW, NWB RLE within 7 day(s). 3.  Patient will perform sit <>stand with modified independence within 7 day(s). 4.  Patient will ambulate with supervision/set-up for 10' or less with RW, NWB RLE within 7 day(s). Physical Therapy Goals  Initiated 3/3/2018  1. Patient will move from supine to sit and sit to supine  in bed with supervision within 7 day(s). MET 3/6/18 see above  2. Patient will transfer from bed to chair and chair to bed with minimal assistance using the least restrictive device within 7 day(s). Almost Achieved 3/6/18 see above  3. Patient will perform sit to stand with minimal assistance within 7 day(s). Met 3/6/18- see above  4. Patient will ambulate with minimal assistance for 25 feet with the least restrictive device within 7 day(s). NOT MET- HOLD at this time and Continue per orders transfers only       physical Therapy TREATMENT  Patient: Billy Babb (51 y.o. female)  Date: 3/7/2018  Diagnosis: RIGHT ANKLE FRACTURE  Ankle fracture, right Ankle fracture, right  Procedure(s) (LRB):  ANKLE OPEN REDUCTION INTERNAL FIXATION (Right) 5 Days Post-Op  Precautions: Bed Alarm, Fall, NWB RLE  Chart, physical therapy assessment, plan of care and goals were reviewed. ASSESSMENT:  BP elevated 176/94 pre activity and 173/78 post activity, c/o lightheadedness  Patient agreeable to OOB and ready for PT. Patient able to perform sit<>stand and SPTs with slightly greater ease today and maintains NWB RLE. Patient up to [facility] and then another pivot to recliner chair on cushion and chair alarm.  Patient forgetting sequence  nearly everytime for hand placement and safe transfer technique. Reviewed with repetition and demonstration again today ( also 3 x yesterday). Will try and type out for her and pull pictures. Up to recliner-  present. Patient's CM has confirmed patient will remain at [facility] until next ankle surgery on 3/14/18. Patient sun downs, has periods of confusion, forgetful, and concern she will go home and drink ETOH and WB RLE or Fall. Fall or Weight bearing on RLE would be devastating for patient. Recommend patient continue OOB to commode and chair with 2 person assist and use gait belt. Progression toward goals:  []    Improving appropriately and progressing toward goals  [x]    Improving slowly and progressing toward goals  []    Not making progress toward goals and plan of care will be adjusted     PLAN:  Patient continues to benefit from skilled intervention to address the above impairments. Continue treatment per established plan of care. Discharge Recommendations:  Home Health  Further Equipment Recommendations for Discharge:  bedside commode, rolling walker and wheelchair 22 inch with elevating legrests- placed another request, date specific     SUBJECTIVE:   Patient stated Gabbi Lundberg will do my best.    OBJECTIVE DATA SUMMARY:   Critical Behavior:  Neurologic State: Alert  Orientation Level: Appropriate for age, Oriented X4  Cognition: Appropriate decision making, Appropriate for age attention/concentration, Appropriate safety awareness, Follows commands  Safety/Judgement: Awareness of environment, Insight into deficits  Functional Mobility Training:  Bed Mobility:     Supine to Sit: Stand-by assistance     Scooting: Stand-by assistance        Transfers:  Sit to Stand: Minimum assistance; Additional time;Assist x1  Stand to Sit: Minimum assistance; Additional time;Assist x1  Stand Pivot Transfers: Assist x2     Bed to Chair: Moderate assistance; Additional time;Assist x2 Balance:  Sitting: Intact; Without support  Standing - Static: Constant support; Fair  Standing - Dynamic : Poor  Ambulation/Gait Training:  Distance (ft):  (8 steps)  Assistive Device: Brace/Splint;Gait belt;Walker, rolling  Ambulation - Level of Assistance: Minimal assistance; Moderate assistance; Additional time;Assist x1        Gait Abnormalities: Antalgic  Right Side Weight Bearing: Non-weight bearing (external fixator)     Base of Support: Shift to left     Speed/Carri: Pace decreased (<100 feet/min)  Step Length: Left shortened;Right shortened       Stairs - Level of Assistance:  (NT and not applicable)    Pain:  Pain Scale 1: Numeric (0 - 10)  Pain Intensity 1: 6  Pain Location 1: Head  Pain Orientation 1: Anterior  Pain Description 1: Aching  Pain Intervention(s) 1: Medication (see MAR)  Activity Tolerance:   Poor- high anxiety and panics. Still watching for CIWA  Please refer to the flowsheet for vital signs taken during this treatment.   After treatment:   [x]    Patient left in no apparent distress sitting up in chair  []    Patient left in no apparent distress in bed  [x]    Call bell left within reach  [x]    Nursing notified  [x]    Caregiver present  [x]     alarm activated    COMMUNICATION/COLLABORATION:   The patients plan of care was discussed with: Registered Nurse    Zachary Marr, PT   Time Calculation: 30 mins

## 2018-03-07 NOTE — PROGRESS NOTES
Ortho Progress Note        S:  Sitting up in chair, pain controlled. Denies NT to R foot    O:  I examined pt's RLE. Pin care done per RN. No erythema, no drainage noted. No s/sx infection. Ex fix in place. Motor/sensory intact R foot. Dorsalis pedal pulses = BLE's.    A:  S/p Ex fix R open Ankle fracture    P:  Pt. Not safe for DC home due to ETOH withdrawels, no support system and NWB status. Recommend Rehab and return Wednesday for outpatient surgery with Dr. David Anand. Recommend transfer admitting services to [facility] if pt. Remains here for continued management of her medical issues. No further Ortho recs until next Wednesday surgery. Per FP:    MARYJO (acute kidney injury)     Hypokalemia due to loss of potassium     HTN (hypertension), benign     Depression with anxiety   Dyslipidemia   Ankle fracture, right (3/2/2018): ortho is attending. Per ortho. Surgery planned for next Wednesday   Hx of ETOH abuse - Had to start CIWA protocol for a short time- night time confusion per nursing.   Alcohol withdrawal as evidenced by confusion & agitation         Dr. Nevaeh Edwards and Dr. David Anand agrees with plan. Thank you for allowing us to take part in this patients care.     MERI Aguilera-BRII  Orthopaedic Surgery PA

## 2018-03-07 NOTE — PROGRESS NOTES
Bedside and Verbal shift change report given to Pancho Hung RN (oncoming nurse) by Lissy Yates RN (offgoing nurse). Report included the following information SBAR, Kardex, Procedure Summary, Intake/Output, MAR and Recent Results.

## 2018-03-08 LAB
ALBUMIN SERPL-MCNC: 3 G/DL (ref 3.5–5)
ALBUMIN/GLOB SERPL: 0.8 {RATIO} (ref 1.1–2.2)
ALP SERPL-CCNC: 64 U/L (ref 45–117)
ALT SERPL-CCNC: 12 U/L (ref 12–78)
ANION GAP SERPL CALC-SCNC: 5 MMOL/L (ref 5–15)
AST SERPL-CCNC: 22 U/L (ref 15–37)
BASOPHILS # BLD: 0 K/UL (ref 0–0.1)
BASOPHILS NFR BLD: 0 % (ref 0–1)
BILIRUB SERPL-MCNC: 0.4 MG/DL (ref 0.2–1)
BUN SERPL-MCNC: 18 MG/DL (ref 6–20)
BUN/CREAT SERPL: 22 (ref 12–20)
CALCIUM SERPL-MCNC: 8.9 MG/DL (ref 8.5–10.1)
CHLORIDE SERPL-SCNC: 101 MMOL/L (ref 97–108)
CO2 SERPL-SCNC: 30 MMOL/L (ref 21–32)
CREAT SERPL-MCNC: 0.81 MG/DL (ref 0.55–1.02)
DIFFERENTIAL METHOD BLD: ABNORMAL
EOSINOPHIL # BLD: 0.1 K/UL (ref 0–0.4)
EOSINOPHIL NFR BLD: 2 % (ref 0–7)
ERYTHROCYTE [DISTWIDTH] IN BLOOD BY AUTOMATED COUNT: 14.4 % (ref 11.5–14.5)
GLOBULIN SER CALC-MCNC: 3.8 G/DL (ref 2–4)
GLUCOSE SERPL-MCNC: 117 MG/DL (ref 65–100)
HCT VFR BLD AUTO: 32.7 % (ref 35–47)
HGB BLD-MCNC: 10.9 G/DL (ref 11.5–16)
IMM GRANULOCYTES # BLD: 0 K/UL (ref 0–0.04)
IMM GRANULOCYTES NFR BLD AUTO: 0 % (ref 0–0.5)
LYMPHOCYTES # BLD: 2 K/UL (ref 0.8–3.5)
LYMPHOCYTES NFR BLD: 29 % (ref 12–49)
MCH RBC QN AUTO: 31.7 PG (ref 26–34)
MCHC RBC AUTO-ENTMCNC: 33.3 G/DL (ref 30–36.5)
MCV RBC AUTO: 95.1 FL (ref 80–99)
MONOCYTES # BLD: 0.8 K/UL (ref 0–1)
MONOCYTES NFR BLD: 12 % (ref 5–13)
NEUTS SEG # BLD: 4.1 K/UL (ref 1.8–8)
NEUTS SEG NFR BLD: 58 % (ref 32–75)
NRBC # BLD: 0 K/UL (ref 0–0.01)
NRBC BLD-RTO: 0 PER 100 WBC
PLATELET # BLD AUTO: 197 K/UL (ref 150–400)
PMV BLD AUTO: 9.7 FL (ref 8.9–12.9)
POTASSIUM SERPL-SCNC: 4.1 MMOL/L (ref 3.5–5.1)
PROT SERPL-MCNC: 6.8 G/DL (ref 6.4–8.2)
RBC # BLD AUTO: 3.44 M/UL (ref 3.8–5.2)
SODIUM SERPL-SCNC: 136 MMOL/L (ref 136–145)
WBC # BLD AUTO: 7.1 K/UL (ref 3.6–11)

## 2018-03-08 PROCEDURE — 80053 COMPREHEN METABOLIC PANEL: CPT | Performed by: FAMILY MEDICINE

## 2018-03-08 PROCEDURE — 74011250637 HC RX REV CODE- 250/637: Performed by: ORTHOPAEDIC SURGERY

## 2018-03-08 PROCEDURE — 97110 THERAPEUTIC EXERCISES: CPT

## 2018-03-08 PROCEDURE — 74011250636 HC RX REV CODE- 250/636: Performed by: ORTHOPAEDIC SURGERY

## 2018-03-08 PROCEDURE — 85025 COMPLETE CBC W/AUTO DIFF WBC: CPT | Performed by: FAMILY MEDICINE

## 2018-03-08 PROCEDURE — 74011250637 HC RX REV CODE- 250/637: Performed by: PHYSICIAN ASSISTANT

## 2018-03-08 PROCEDURE — 65270000029 HC RM PRIVATE

## 2018-03-08 PROCEDURE — 74011250637 HC RX REV CODE- 250/637: Performed by: FAMILY MEDICINE

## 2018-03-08 PROCEDURE — 97530 THERAPEUTIC ACTIVITIES: CPT

## 2018-03-08 PROCEDURE — 36415 COLL VENOUS BLD VENIPUNCTURE: CPT | Performed by: FAMILY MEDICINE

## 2018-03-08 PROCEDURE — 74011250636 HC RX REV CODE- 250/636: Performed by: FAMILY MEDICINE

## 2018-03-08 RX ADMIN — Medication 100 MG: at 08:57

## 2018-03-08 RX ADMIN — LORAZEPAM 1 MG: 2 INJECTION INTRAMUSCULAR; INTRAVENOUS at 06:08

## 2018-03-08 RX ADMIN — OXYCODONE HYDROCHLORIDE AND ACETAMINOPHEN 1 TABLET: 7.5; 325 TABLET ORAL at 11:10

## 2018-03-08 RX ADMIN — CEPHALEXIN 500 MG: 250 CAPSULE ORAL at 15:09

## 2018-03-08 RX ADMIN — TRAZODONE HYDROCHLORIDE 50 MG: 50 TABLET ORAL at 00:27

## 2018-03-08 RX ADMIN — HYDROCHLOROTHIAZIDE 25 MG: 25 TABLET ORAL at 09:03

## 2018-03-08 RX ADMIN — Medication 10 ML: at 06:08

## 2018-03-08 RX ADMIN — SIMVASTATIN 20 MG: 20 TABLET, FILM COATED ORAL at 18:19

## 2018-03-08 RX ADMIN — HEPARIN SODIUM 5000 UNITS: 5000 INJECTION, SOLUTION INTRAVENOUS; SUBCUTANEOUS at 15:10

## 2018-03-08 RX ADMIN — AMLODIPINE BESYLATE 10 MG: 5 TABLET ORAL at 08:57

## 2018-03-08 RX ADMIN — POTASSIUM CHLORIDE 20 MEQ: 750 TABLET, FILM COATED, EXTENDED RELEASE ORAL at 08:57

## 2018-03-08 RX ADMIN — Medication 10 ML: at 15:10

## 2018-03-08 RX ADMIN — OXYCODONE HYDROCHLORIDE AND ACETAMINOPHEN 1 TABLET: 7.5; 325 TABLET ORAL at 06:07

## 2018-03-08 RX ADMIN — GABAPENTIN 600 MG: 300 CAPSULE ORAL at 21:41

## 2018-03-08 RX ADMIN — HEPARIN SODIUM 5000 UNITS: 5000 INJECTION, SOLUTION INTRAVENOUS; SUBCUTANEOUS at 06:08

## 2018-03-08 RX ADMIN — THERA TABS 1 TABLET: TAB at 08:57

## 2018-03-08 RX ADMIN — LETROZOLE 2.5 MG: 2.5 TABLET, FILM COATED ORAL at 11:10

## 2018-03-08 RX ADMIN — CARVEDILOL 12.5 MG: 12.5 TABLET, FILM COATED ORAL at 16:53

## 2018-03-08 RX ADMIN — CEPHALEXIN 500 MG: 250 CAPSULE ORAL at 21:41

## 2018-03-08 RX ADMIN — CARVEDILOL 12.5 MG: 12.5 TABLET, FILM COATED ORAL at 09:03

## 2018-03-08 RX ADMIN — LOSARTAN POTASSIUM 50 MG: 50 TABLET ORAL at 09:03

## 2018-03-08 RX ADMIN — POTASSIUM CHLORIDE 20 MEQ: 750 TABLET, FILM COATED, EXTENDED RELEASE ORAL at 18:19

## 2018-03-08 RX ADMIN — LORAZEPAM 1 MG: 2 INJECTION INTRAMUSCULAR; INTRAVENOUS at 21:38

## 2018-03-08 RX ADMIN — OXYCODONE HYDROCHLORIDE AND ACETAMINOPHEN 1 TABLET: 7.5; 325 TABLET ORAL at 16:53

## 2018-03-08 RX ADMIN — DULOXETINE HYDROCHLORIDE 20 MG: 20 CAPSULE, DELAYED RELEASE ORAL at 11:10

## 2018-03-08 RX ADMIN — GABAPENTIN 600 MG: 300 CAPSULE ORAL at 08:57

## 2018-03-08 RX ADMIN — OXYCODONE HYDROCHLORIDE AND ACETAMINOPHEN 1 TABLET: 7.5; 325 TABLET ORAL at 21:41

## 2018-03-08 RX ADMIN — Medication 10 ML: at 21:43

## 2018-03-08 RX ADMIN — HEPARIN SODIUM 5000 UNITS: 5000 INJECTION, SOLUTION INTRAVENOUS; SUBCUTANEOUS at 21:42

## 2018-03-08 RX ADMIN — GABAPENTIN 600 MG: 300 CAPSULE ORAL at 15:10

## 2018-03-08 RX ADMIN — CEPHALEXIN 500 MG: 250 CAPSULE ORAL at 06:07

## 2018-03-08 RX ADMIN — FOLIC ACID 1 MG: 1 TABLET ORAL at 08:57

## 2018-03-08 RX ADMIN — LEVOTHYROXINE SODIUM 50 MCG: 50 TABLET ORAL at 06:07

## 2018-03-08 NOTE — PROGRESS NOTES
Problem: Mobility Impaired (Adult and Pediatric)  Goal: *Acute Goals and Plan of Care (Insert Text)  Physical Therapy Goals  Revised 3/6/2018- Goals upgraded as progress noted today  1. Patient will move from supine to sit and sit to supine and scoot up and down  in bed with modified independence within 7 day(s). 2.  Patient will transfer from bed to chair and chair to bed with supervision/set-up with RW, NWB RLE within 7 day(s). 3.  Patient will perform sit <>stand with modified independence within 7 day(s). 4.  Patient will ambulate with supervision/set-up for 10' or less with RW, NWB RLE within 7 day(s). Physical Therapy Goals  Initiated 3/3/2018  1. Patient will move from supine to sit and sit to supine  in bed with supervision within 7 day(s). MET 3/6/18 see above  2. Patient will transfer from bed to chair and chair to bed with minimal assistance using the least restrictive device within 7 day(s). Almost Achieved 3/6/18 see above  3. Patient will perform sit to stand with minimal assistance within 7 day(s). Met 3/6/18- see above  4. Patient will ambulate with minimal assistance for 25 feet with the least restrictive device within 7 day(s). NOT MET- HOLD at this time and Continue per orders transfers only       physical Therapy TREATMENT  Patient: Makayla Kebede (58 y.o. female)  Date: 3/8/2018  Diagnosis: RIGHT ANKLE FRACTURE  Ankle fracture, right Ankle fracture, right  Procedure(s) (LRB):  ANKLE OPEN REDUCTION INTERNAL FIXATION (Right) 6 Days Post-Op  Precautions: Bed Alarm, Fall, NWB  Chart, physical therapy assessment, plan of care and goals were reviewed. ASSESSMENT:  Pt received in chair, agreeable to participate with physical therapy. Denies pain at rest. Sit<>stand with Min A using RW maintaining NWB on RLE. Verbal cues for proper hand placement. Stand pivot transfer using RW to [facility]. +BM, able to perform hygiene without assistance.  Performed squat pivot transfer to the L (strong side) from [facility] >bed with CGA. Sit>supine with CGA/ Min A for RLE. Reviewed supine HEP of BLE. RLE elevated on pillows. Pt tearful during session stating \"feeling overwhelmed\" Offered supportive listening and reassurance. RN notified. Progression toward goals:  []    Improving appropriately and progressing toward goals  [x]    Improving slowly and progressing toward goals  []    Not making progress toward goals and plan of care will be adjusted     PLAN:  Patient continues to benefit from skilled intervention to address the above impairments. Continue treatment per established plan of care. Discharge Recommendations:  Home Health  Further Equipment Recommendations for Discharge:  Order placed for DME     SUBJECTIVE:   Patient stated I am doing ok.     OBJECTIVE DATA SUMMARY:   Critical Behavior:  Neurologic State: Alert, Appropriate for age, Eyes open spontaneously  Orientation Level: Oriented X4, Appropriate for age  Cognition: Follows commands, Appropriate decision making, Appropriate for age attention/concentration, Appropriate safety awareness  Safety/Judgement: Awareness of environment, Insight into deficits  Functional Mobility Training:  Bed Mobility:     Supine to Sit: Stand-by assistance  Sit to Supine: Minimum assistance           Transfers:  Sit to Stand: Minimum assistance  Stand to Sit: Minimum assistance     Stand Pivot Transfers: Minimal assistance  Bed to Chair: Minimum assistance                    Balance:  Sitting: Intact  Standing: Intact; With support  Standing - Static: Constant support; Fair  Standing - Dynamic : Fair  Ambulation/Gait Training:  Distance (ft):  (Stand pivot transfers)  Assistive Device: Gait belt;Walker, rolling  Ambulation - Level of Assistance: Minimal assistance; Additional time        Gait Abnormalities: Antalgic                                      Stairs:              Neuro Re-Education:    Therapeutic Exercises:   BLE =SLR , heel slide,LAQ    LLE =only ankle pumps  Pain:  Pain Scale 1: Numeric (0 - 10)  Pain Intensity 1: 2  Pain Location 1: Ankle  Pain Orientation 1: Lower; Left  Pain Description 1: Aching  Pain Intervention(s) 1: Medication (see MAR)  Activity Tolerance:   Good  Please refer to the flowsheet for vital signs taken during this treatment.   After treatment:   []    Patient left in no apparent distress sitting up in chair  [x]    Patient left in no apparent distress in bed  [x]    Call bell left within reach  [x]    Nursing notified  []    Caregiver present  [x]    Bed alarm activated    COMMUNICATION/COLLABORATION:   The patients plan of care was discussed with: Registered Nurse    Elaine Rubio   Time Calculation: 30 mins

## 2018-03-08 NOTE — PROGRESS NOTES
3/8/2018     3:04 PM   CM received denial from [facility]. CM completed referral to [facility], and is awaiting response. 11:12 AM  CM consult noted. CM submitted referral to [facility], and is awaiting response.

## 2018-03-08 NOTE — PROGRESS NOTES
Nutrition Assessment:    RECOMMENDATIONS/INTERVENTION(S):   Continue Regular diet, RD to add snacks per pt's preferences  Continue MVI, thiamine, folic acid  Continue to monitor & replete lytes prn  Will monitor the plan of care, PO intakes, labs, wt & nutrition needs post-op    ASSESSMENT:   3/8:  Pt seen for LOS. 61 yof admitted for R ankle fx. Ortho following - plans for surgery next week. Hx of Etoh. Labs/meds reviewed. Etoh vitamins. K repleted. S/p MARYJO. Na WDL. No Mag, Phos. Regular diet x 5 days. Visited pt this afternoon. Tolerating diet w/ good appetite & PO intakes, eating \"all\" of meals. Ordering per preferences - eating a variety per meal recall. Decreased appetite x 1 wk PTA (pain?). #.  3.2% wt loss x 1 wk PTA if accurate - wt hx not available. Normal BMI. No mention of Etoh. Pt requesting snacks between meals (barbie crackers, PB, fruit). Current diet appropriate to meet nutrition needs - reassess post-op. SUBJECTIVE/OBJECTIVE:     Diet Order: Regular  % Eaten:  No data found. Pertinent Medications: [x] Reviewed  Past Medical History:   Diagnosis Date    Alcohol abuse     Anxiety     Depression     Fatigue     Hypertension     Memory loss     Vertigo     Visual disturbance        Chemistries:  Lab Results   Component Value Date/Time    Sodium 136 03/08/2018 12:20 AM    Potassium 4.1 03/08/2018 12:20 AM    Chloride 101 03/08/2018 12:20 AM    CO2 30 03/08/2018 12:20 AM    Anion gap 5 03/08/2018 12:20 AM    Glucose 117 (H) 03/08/2018 12:20 AM    BUN 18 03/08/2018 12:20 AM    Creatinine 0.81 03/08/2018 12:20 AM    BUN/Creatinine ratio 22 (H) 03/08/2018 12:20 AM    GFR est AA >60 03/08/2018 12:20 AM    GFR est non-AA >60 03/08/2018 12:20 AM    Calcium 8.9 03/08/2018 12:20 AM    AST (SGOT) 22 03/08/2018 12:20 AM    Alk.  phosphatase 64 03/08/2018 12:20 AM    Protein, total 6.8 03/08/2018 12:20 AM    Albumin 3.0 (L) 03/08/2018 12:20 AM    Globulin 3.8 03/08/2018 12:20 AM    A-G Ratio 0.8 (L) 03/08/2018 12:20 AM    ALT (SGPT) 12 03/08/2018 12:20 AM      Anthropometrics: Height: 5' 7\" (170.2 cm) Weight: 70.3 kg (154 lb 15.7 oz)    IBW (%IBW): 67.5 kg (148 lb 13 oz) (104.15 %) UBW (%UBW): 72.6 kg (160 lb) (96.86 %)    BMI: Body mass index is 24.27 kg/(m^2). This BMI is indicative of:   [] Underweight    [x] Normal    [] Overweight    []  Obesity    []  Extreme Obesity (BMI>40)  Estimated Nutrition Needs (Based on): 1677 Kcals/day (BMR (1290) x 1.3 AF) , 63 g (-77 (0.9-1.1 g/kg x actual BW)) Protein  Carbohydrate: At Least 130 g/day  Fluids: 1700 mL/day    Last BM: 3/7, abd WDL    []Active     []Hyperactive  []Hypoactive       [] Absent   BS - not charted  Skin:    [] Intact   [] Incision  [] Breakdown   [] DTI   [x] Tears/Excoriation/Abrasion - R ankle  []Edema [] Other:      Wt Readings from Last 30 Encounters:   03/08/18 70.3 kg (154 lb 15.7 oz)   08/24/16 74.5 kg (164 lb 3.2 oz)   02/08/16 76.1 kg (167 lb 12.8 oz)   12/31/15 72.7 kg (160 lb 3.2 oz)   12/07/15 69.9 kg (154 lb 1.6 oz)   10/23/15 68 kg (150 lb)   07/06/15 68 kg (150 lb)      NUTRITION DIAGNOSES:   Problem:  No nutritional diagnosis at this time     Etiology: related to       Signs/Symptoms: as evidenced by        NUTRITION INTERVENTIONS:  Meals/Snacks: General/healthful diet                  GOAL:   PO intake of meals, snacks >/= 75% in the next 3-5 days    Cultural, Buddhist, or Ethnic Dietary Needs: None     EDUCATION & DISCHARGE NEEDS:    [x] None Identified   [] Identified and Education Provided/Documented   [] Identified and Pt declined/was not appropriate      [x] Interdisciplinary Care Plan Reviewed/Documented    [x] Discharge Needs:    TBD   [] No Nutrition Related Discharge Needs    NUTRITION RISK:   Pt Is At Nutrition Risk  [x]     No Nutrition Risk Identified  []       PT SEEN FOR:    []  MD Consult: []Calorie Count      []Diabetic Diet Education        []Diet Education     []Electrolyte Management     []General Nutrition Management and Supplements     []Management of Tube Feeding     []TPN Recommendations    []  RN Referral:  []MST score >=2     []Enteral/Parenteral Nutrition PTA     []Pregnant: Gestational DM or Multigestation                 [] Pressure Ulcer    []  Low BMI      [x]  Length of Stay       [] Dysphagia Diet         [] Ventilator  []  Follow-up     Previous Recommendations:   [] Implemented          [] Not Implemented          [x] Not Applicable    Previous Goal:   [] Met              [] Progressing Towards Goal              [] Not Progressing Towards Goal   [x] Not Applicable            Deborah Hinton, [address] [place] [address]  Pager [phone]

## 2018-03-08 NOTE — PROGRESS NOTES
Problem: Mobility Impaired (Adult and Pediatric)  Goal: *Acute Goals and Plan of Care (Insert Text)  Physical Therapy Goals  Revised 3/6/2018- Goals upgraded as progress noted today  1. Patient will move from supine to sit and sit to supine and scoot up and down  in bed with modified independence within 7 day(s). 2.  Patient will transfer from bed to chair and chair to bed with supervision/set-up with RW, NWB RLE within 7 day(s). 3.  Patient will perform sit <>stand with modified independence within 7 day(s). 4.  Patient will ambulate with supervision/set-up for 10' or less with RW, NWB RLE within 7 day(s). Physical Therapy Goals  Initiated 3/3/2018  1. Patient will move from supine to sit and sit to supine  in bed with supervision within 7 day(s). MET 3/6/18 see above  2. Patient will transfer from bed to chair and chair to bed with minimal assistance using the least restrictive device within 7 day(s). Almost Achieved 3/6/18 see above  3. Patient will perform sit to stand with minimal assistance within 7 day(s). Met 3/6/18- see above  4. Patient will ambulate with minimal assistance for 25 feet with the least restrictive device within 7 day(s). NOT MET- HOLD at this time and Continue per orders transfers only       physical Therapy TREATMENT  Patient: Melanie Cote (32 y.o. female)  Date: 3/8/2018  Diagnosis: RIGHT ANKLE FRACTURE  Ankle fracture, right Ankle fracture, right  Procedure(s) (LRB):  ANKLE OPEN REDUCTION INTERNAL FIXATION (Right) 6 Days Post-Op  Precautions: Bed Alarm, Fall, NWB  Chart, physical therapy assessment, plan of care and goals were reviewed. ASSESSMENT:  Pt received in bed, reporting 3/10 pain at rest. SBA for supine >sitting EOB. Sit<>stand usign RW with Min A. Requires occasional verbal cues for hand placement, improved carryover from yesterday. SPT bed>BSC>reclining chair with Min A able to maintain NWB on RLE with transfers.  Pt does require encouragement with transfers as patient does become anxious with OOB acitivity. Pt returned to reclining chair with RLE elevated. Agreeable to sit in chair until after lunch. Progression toward goals:  []    Improving appropriately and progressing toward goals  [x]    Improving slowly and progressing toward goals  []    Not making progress toward goals and plan of care will be adjusted     PLAN:  Patient continues to benefit from skilled intervention to address the above impairments. Continue treatment per established plan of care. Discharge Recommendations:  Home Health  Further Equipment Recommendations for Discharge:  none     SUBJECTIVE:   Patient stated Doe Marcos will need some help.     OBJECTIVE DATA SUMMARY:   Critical Behavior:  Neurologic State: Alert, Appropriate for age  Orientation Level: Oriented X4  Cognition: Appropriate decision making, Appropriate for age attention/concentration, Appropriate safety awareness, Follows commands  Safety/Judgement: Awareness of environment, Insight into deficits  Functional Mobility Training:  Bed Mobility:     Supine to Sit: Stand-by assistance              Transfers:  Sit to Stand: Minimum assistance; Additional time  Stand to Sit: Minimum assistance; Additional time     Stand Pivot Transfers: Minimal assistance  Bed to Chair: Minimum assistance                    Balance:  Sitting: Intact  Standing: Impaired  Ambulation/Gait Training:  Distance (ft):  (Stand pivot transfers)  Assistive Device: Gait belt;Walker, rolling  Ambulation - Level of Assistance: Minimal assistance; Additional time        Gait Abnormalities: Antalgic                                      Stairs:              Neuro Re-Education:    Therapeutic Exercises:     Pain:  Pain Scale 1: Numeric (0 - 10)  Pain Intensity 1: 3  Pain Location 1: Leg  Pain Orientation 1: Lower; Left  Pain Description 1: Aching  Pain Intervention(s) 1: Medication (see MAR)  Activity Tolerance:   Good  Please refer to the flowsheet for vital signs taken during this treatment.   After treatment:   [x]    Patient left in no apparent distress sitting up in chair  []    Patient left in no apparent distress in bed  [x]    Call bell left within reach  [x]    Nursing notified  []    Caregiver present  [x]    Chair alarm activated    COMMUNICATION/COLLABORATION:   The patients plan of care was discussed with: Registered Nurse    Liz Leroy   Time Calculation: 19 mins

## 2018-03-08 NOTE — PROGRESS NOTES
Family Practice Consult and Daily Progress Note: 3/8/2018  Fidel Cogan, MD    Assessment/Plan:   MARYJO (acute kidney injury) [facility]) (3/3/2018) vrs some degree of CKD -  likely due to volume depletion. Had vomiting, diuretic use. Holding Hydrodiuril and ARB. Start IVFs and follow renal function     Hypokalemia due to loss of potassium (3/3/2018): from diuretic use. DC hydrodiuril. Replete and follow K+     HTN (hypertension), benign (3/3/2018): Continue Coreg. Resumed Losarten and HCTZ. Added Amlodipine.      Depression with anxiety (3/3/2018): resume Cymbalta     Dyslipidemia (3/3/2018): resume simvastatin      Ankle fracture, right (3/2/2018): ortho is attending. Per ortho.  Surgery planned for next Wednesday    Hx of ETOH abuse - Had to start CIWA protocol for a short time- resolved    Alcohol withdrawal as evidenced by confusion & agitation treated with IV Ativan 2mg and placed on CIWA protocol - resolved        Problem List:  Problem List as of 3/8/2018  Date Reviewed: 8/24/2016          Codes Class Noted - Resolved    MARYJO (acute kidney injury) ([facility] [address] ICD-10-CM: N17.9  ICD-9-CM: 584.9  3/3/2018 - Present        Hypokalemia due to loss of potassium ICD-10-CM: E87.6  ICD-9-CM: 276.8  3/3/2018 - Present        HTN (hypertension), benign ICD-10-CM: I10  ICD-9-CM: 401.1  3/3/2018 - Present        Depression with anxiety ICD-10-CM: F41.8  ICD-9-CM: 300.4  3/3/2018 - Present        Dyslipidemia ICD-10-CM: E78.5  ICD-9-CM: 272.4  3/3/2018 - Present        * (Principal)Ankle fracture, right ICD-10-CM: M64.875M  ICD-9-CM: 824.8  3/2/2018 - Present        Numbness and tingling of both legs below knees ICD-10-CM: R20.0, R20.2  ICD-9-CM: 782.0  11/30/2015 - Present        Numbness in feet ICD-10-CM: R20.0  ICD-9-CM: 782.0  10/23/2015 - Present        Burning sensation of feet ICD-10-CM: R20.8  ICD-9-CM: 782.0  10/23/2015 - Present        SA[facility]ar[facility]) [facility] ICD-10-CM: I60.9  ICD-9-CM: 430  10/23/2015 - Present              Subjective:    60 yo WF, pt Dr Ravi Thomas in our group, admitted after fall and severe fx of right ankle requiring 2 stage surg repair, first repair done 3/2/18. Pt reports no syncope or dizziness prior to fall and no prodrome events. She did walk on the foot for 2 days post fall and had little pain due to her chronic LE neuropathy. We are asked to see her for her medical problems including elevated creat. She does report marked decrease in intake for 2 days since her fall. She has a hx of HTN which has been relatively well controlled outpt. She also has a hx hypothyroidism, LE neuropathy, depression with anxiety and hypothyroidism and reports she is compliant with her medications. In addition, she reports Alcoholism and reports she has been sober for [place] couple of months. \"  She is going to call her sponsor today as she is feeling additional stress from the fall and surg. Currently she is stable for follow up surg. 3/4: Had more agitation and confusion last night and had to start CIWA protocol. She is better this am but still having problems with word retrieval. She reports to me that her last drink was Monday but told Dr Shonna Torres it was months ago. Potassium still low at 2.9. Will replete with po.     3/5:  Agitation and confusion have resolved. K+ back up after repletion. Discussed with ortho. 3/6:  Feeling better this AM.  BP spiked last night and Amlodipine added and resumed her usual BP meds - BP now better. No further signs of ETOH withdrawal.      3/7: BP is much better. She slept well last night. No signs of withdrawal. Case management working on placement until her surgery. She is not safe to go home. Potassium a little low. 3/8:  No new complaints. K+ back up. BP ok. Medical problems stable.        Past Medical History:   Diagnosis Date    Alcohol abuse     Anxiety     Depression     Fatigue     Hypertension     Memory loss     Vertigo  Visual disturbance      Past Surgical History:   Procedure Laterality Date    HX ANKLE FRACTURE TX Right 12/2016    HX APPENDECTOMY      HX ORTHOPAEDIC      right knee repair    HX OTHER SURGICAL  04/2016    double mastectomy     HX OTHER SURGICAL  06/2016    reconstructive surgery on her breasts    HX SHOULDER REPLACEMENT Right 11/2016    HX WRIST FRACTURE TX Left 12/2016     Social History     Social History    Marital status:      Spouse name: N/A    Number of children: N/A    Years of education: N/A     Occupational History    Not on file. Social History Main Topics    Smoking status: Never Smoker    Smokeless tobacco: Never Used    Alcohol use 21.0 oz/week     35 Glasses of wine per week      Comment: last drink was 2/27    Drug use: Not on file    Sexual activity: Not on file     Other Topics Concern    Not on file     Social History Narrative     Family History   Problem Relation Age of Onset    Cancer Mother     Other Father      Allergies   Allergen Reactions    Sulfa (Sulfonamide Antibiotics) Swelling and Other (comments)     Flushing and hot feeling   Facial swelling       Review of Systems:     Constitutional ROS: no fever, chills, rigors or night sweats; + for fatigue  Respiratory ROS: no cough, sputum, hemoptysis, dyspnea or pleuritic pain. Cardiovascular ROS: no chest pain, palpitation, orthopnea, PND or syncope  Endocrine ROS: no polydispsia, polyuria, heat or cold intolerance   Gastrointestinal ROS: no dysphagia, abdominal pain, nausea, vomiting, diarrhea or bleeding. Genito-Urinary ROS: no dysuria, frequency, hematuria, retention, or flank pain  Musculoskeletal ROS: right ankle pain. Neurological ROS: no headache, confusion, diplopia, focal symptoms, tremor, or hallucinosis.  LE neuropathy unchanged  Psychiatric ROS: ROS: feeling mod depression vrs sadness due to injury/fall she reports, no current anxiety, mood swings  Dermatological ROS: no rash, pruritis, or urticaria    Objective:   Physical Exam:     Visit Vitals    /70 (BP 1 Location: Left arm, BP Patient Position: Head of bed elevated (Comment degrees))    Pulse 76    Temp 98.3 °F (36.8 °C)    Resp 18    Ht 5' 7\" (1.702 m)    Wt 70.3 kg (155 lb)    SpO2 97%    Breastfeeding No    BMI 24.28 kg/m2    O2 Flow Rate (L/min): 1.5 l/min O2 Device: Room air    Temp (24hrs), Av.1 °F (36.7 °C), Min:97.5 °F (36.4 °C), Max:98.5 °F (36.9 °C)    1901 -  07  In: -   Out: 700 [Urine:700]   701 - 1900  In: -   Out: 950 [Urine:950]    General:  Alert, cooperative, no distress, appears stated age. Head:  Normocephalic, without obvious abnormality, atraumatic. Eyes:  Conjunctivae/corneas clear. PERRL, EOMs intact. Nose: Nares normal. Septum midline. Mucosa normal. No drainage or sinus tenderness. Throat: Lips, mucosa, and tongue moist..   Neck: Supple, symmetrical, trachea midline, no adenopathy, thyroid: no enlargement/tenderness/nodules, no carotid bruit and no JVD. Back:   Symmetric, no curvature. ROM normal. No CVA tenderness. Lungs:   Clear to auscultation bilaterally. Chest wall:  No tenderness or deformity. Heart:  Regular rate and rhythm, S1, S2 normal, no murmur, click, rub or gallop. Abdomen:   Soft, non-tender. Bowel sounds normal. No masses,  No organomegaly. Extremities: no cyanosis or edema. No calf tenderness or cords left. Right LE in splint and metal frame/external fixation; moves toes to command. Feet warm. Dressing dry. Pulses: 2+ and symmetric all extremities. Skin: Skin color, texture, turgor normal. No rashes or lesions   Neurologic: CNII-XII intact. Alert and oriented X 3. Fine motor of hands and fingers normal.  No tremor.  equal.  No cogwheeling or rigidity. Gait not tested at this time. Sensation grossly normal to touch except LEs.   Gross motor of extremities otherwise normal.       Data Review:   EXAM:  XR TIB/FIB RT, XR ANKLE RT MIN 3 V 3/2/18  INDICATION:  fx.  Right ankle deformity after a fall  COMPARISON: None. FINDINGS: AP and lateral  views of the right tibia and fibula. 3 views of the  right ankle. The patient is status post ORIF of the patella. There is mild medial compartment  osteophytosis in the knee. There is chronic deformity of the fibular neck. There  is a displaced fracture of the medial malleolus. The medial malleolus is 1.5 cm  from the distal tibia. There is a comminuted displaced fracture of the lateral  malleolus at the level of the tibiotalar joint. The talus and distal lateral  malleolus are dislocated laterally by 2.8 cm and slightly retracted. IMPRESSION: Bimalleolar ankle fracture dislocation    Recent Days:  Recent Labs      03/08/18   0020  03/07/18   0118  03/06/18   0432   WBC  7.1  6.4  6.6   HGB  10.9*  10.2*  11.4*   HCT  32.7*  30.8*  33.7*   PLT  197  167  158     Recent Labs      03/08/18   0020  03/07/18   0118  03/06/18   0432   NA  136  136  135*   K  4.1  3.3*  4.1   CL  101  99  97   CO2  30  30  32   GLU  117*  110*  106*   BUN  18  18  12   CREA  0.81  0.85  0.81   CA  8.9  8.8  9.2   ALB  3.0*  2.9*  3.2*   TBILI  0.4  0.3  0.5   SGOT  22  24  25   ALT  12  13  10*     No results for input(s): PH, PCO2, PO2, HCO3, FIO2 in the last 72 hours.     24 Hour Results:  Recent Results (from the past 24 hour(s))   CBC WITH AUTOMATED DIFF    Collection Time: 03/08/18 12:20 AM   Result Value Ref Range    WBC 7.1 3.6 - 11.0 K/uL    RBC 3.44 (L) 3.80 - 5.20 M/uL    HGB 10.9 (L) 11.5 - 16.0 g/dL    HCT 32.7 (L) 35.0 - 47.0 %    MCV 95.1 80.0 - 99.0 FL    MCH 31.7 26.0 - 34.0 PG    MCHC 33.3 30.0 - 36.5 g/dL    RDW 14.4 11.5 - 14.5 %    PLATELET 634 886 - 405 K/uL    MPV 9.7 8.9 - 12.9 FL    NRBC 0.0 0  WBC    ABSOLUTE NRBC 0.00 0.00 - 0.01 K/uL    NEUTROPHILS 58 32 - 75 %    LYMPHOCYTES 29 12 - 49 %    MONOCYTES 12 5 - 13 %    EOSINOPHILS 2 0 - 7 %    BASOPHILS 0 0 - 1 %    IMMATURE GRANULOCYTES 0 0.0 - 0.5 %    ABS. NEUTROPHILS 4.1 1.8 - 8.0 K/UL    ABS. LYMPHOCYTES 2.0 0.8 - 3.5 K/UL    ABS. MONOCYTES 0.8 0.0 - 1.0 K/UL    ABS. EOSINOPHILS 0.1 0.0 - 0.4 K/UL    ABS. BASOPHILS 0.0 0.0 - 0.1 K/UL    ABS. IMM. GRANS. 0.0 0.00 - 0.04 K/UL    DF AUTOMATED     METABOLIC PANEL, COMPREHENSIVE    Collection Time: 03/08/18 12:20 AM   Result Value Ref Range    Sodium 136 136 - 145 mmol/L    Potassium 4.1 3.5 - 5.1 mmol/L    Chloride 101 97 - 108 mmol/L    CO2 30 21 - 32 mmol/L    Anion gap 5 5 - 15 mmol/L    Glucose 117 (H) 65 - 100 mg/dL    BUN 18 6 - 20 MG/DL    Creatinine 0.81 0.55 - 1.02 MG/DL    BUN/Creatinine ratio 22 (H) 12 - 20      GFR est AA >60 >60 ml/min/1.73m2    GFR est non-AA >60 >60 ml/min/1.73m2    Calcium 8.9 8.5 - 10.1 MG/DL    Bilirubin, total 0.4 0.2 - 1.0 MG/DL    ALT (SGPT) 12 12 - 78 U/L    AST (SGOT) 22 15 - 37 U/L    Alk.  phosphatase 64 45 - 117 U/L    Protein, total 6.8 6.4 - 8.2 g/dL    Albumin 3.0 (L) 3.5 - 5.0 g/dL    Globulin 3.8 2.0 - 4.0 g/dL    A-G Ratio 0.8 (L) 1.1 - 2.2       Medications reviewed  Current Facility-Administered Medications   Medication Dose Route Frequency    potassium chloride SR (KLOR-CON 10) tablet 20 mEq  20 mEq Oral BID    letrozole (FEMARA) tablet 2.5 mg  2.5 mg Oral DAILY    cephALEXin (KEFLEX) capsule 500 mg  500 mg Oral Q8H    gabapentin (NEURONTIN) capsule 600 mg  600 mg Oral TID    diphenhydrAMINE (BENADRYL) capsule 25 mg  25 mg Oral Q6H PRN    diphenhydrAMINE (BENADRYL) injection 25 mg  25 mg IntraVENous Q6H PRN    traZODone (DESYREL) tablet 50 mg  50 mg Oral QHS PRN    traZODone (DESYREL) tablet 50 mg  50 mg Oral QHS PRN    hydroCHLOROthiazide (HYDRODIURIL) tablet 25 mg  25 mg Oral DAILY    losartan (COZAAR) tablet 50 mg  50 mg Oral DAILY    amLODIPine (NORVASC) tablet 10 mg  10 mg Oral DAILY    hydrALAZINE (APRESOLINE) 20 mg/mL injection 20 mg  20 mg IntraVENous Q4H PRN    prochlorperazine (COMPAZINE) tablet 10 mg  10 mg Oral Q6H PRN    therapeutic multivitamin (THERAGRAN) tablet 1 Tab  1 Tab Oral DAILY    carvedilol (COREG) tablet 12.5 mg  12.5 mg Oral BID WITH MEALS    DULoxetine (CYMBALTA) capsule 20 mg  20 mg Oral DAILY    levothyroxine (SYNTHROID) tablet 50 mcg  50 mcg Oral ACB    simvastatin (ZOCOR) tablet 20 mg  20 mg Oral DAILY    sodium chloride (NS) flush 5-10 mL  5-10 mL IntraVENous Q8H    sodium chloride (NS) flush 5-10 mL  5-10 mL IntraVENous PRN    heparin (porcine) injection 5,000 Units  5,000 Units SubCUTAneous Q8H    acetaminophen (TYLENOL) tablet 650 mg  650 mg Oral Q4H PRN    oxyCODONE-acetaminophen (PERCOCET 7.5) 7.5-325 mg per tablet 1 Tab  1 Tab Oral Q4H PRN    HYDROmorphone (PF) (DILAUDID) injection 1 mg  1 mg IntraVENous Q4H PRN    naloxone (NARCAN) injection 0.4 mg  0.4 mg IntraVENous PRN    folic acid (FOLVITE) tablet 1 mg  1 mg Oral DAILY    Thiamine Mononitrate (B-1) tablet 100 mg  100 mg Oral DAILY    LORazepam (ATIVAN) injection 1 mg  1 mg IntraVENous Q6H PRN    prochlorperazine (COMPAZINE) injection 5 mg  5 mg IntraVENous Q6H PRN     Care Plan discussed with: Patient/Family and Nurse  Total time spent with patient: 30 minutes.   Dayron Smith MD

## 2018-03-09 LAB
ALBUMIN SERPL-MCNC: 3 G/DL (ref 3.5–5)
ALBUMIN/GLOB SERPL: 0.8 {RATIO} (ref 1.1–2.2)
ALP SERPL-CCNC: 61 U/L (ref 45–117)
ALT SERPL-CCNC: 11 U/L (ref 12–78)
ANION GAP SERPL CALC-SCNC: 10 MMOL/L (ref 5–15)
AST SERPL-CCNC: 19 U/L (ref 15–37)
BASOPHILS # BLD: 0 K/UL (ref 0–0.1)
BASOPHILS NFR BLD: 1 % (ref 0–1)
BILIRUB SERPL-MCNC: 0.4 MG/DL (ref 0.2–1)
BUN SERPL-MCNC: 15 MG/DL (ref 6–20)
BUN/CREAT SERPL: 19 (ref 12–20)
CALCIUM SERPL-MCNC: 9.1 MG/DL (ref 8.5–10.1)
CHLORIDE SERPL-SCNC: 101 MMOL/L (ref 97–108)
CO2 SERPL-SCNC: 28 MMOL/L (ref 21–32)
CREAT SERPL-MCNC: 0.78 MG/DL (ref 0.55–1.02)
DIFFERENTIAL METHOD BLD: ABNORMAL
EOSINOPHIL # BLD: 0.1 K/UL (ref 0–0.4)
EOSINOPHIL NFR BLD: 2 % (ref 0–7)
ERYTHROCYTE [DISTWIDTH] IN BLOOD BY AUTOMATED COUNT: 14.2 % (ref 11.5–14.5)
GLOBULIN SER CALC-MCNC: 3.7 G/DL (ref 2–4)
GLUCOSE SERPL-MCNC: 98 MG/DL (ref 65–100)
HCT VFR BLD AUTO: 32.4 % (ref 35–47)
HGB BLD-MCNC: 10.6 G/DL (ref 11.5–16)
IMM GRANULOCYTES # BLD: 0 K/UL (ref 0–0.04)
IMM GRANULOCYTES NFR BLD AUTO: 0 % (ref 0–0.5)
LYMPHOCYTES # BLD: 2.3 K/UL (ref 0.8–3.5)
LYMPHOCYTES NFR BLD: 34 % (ref 12–49)
MCH RBC QN AUTO: 31.5 PG (ref 26–34)
MCHC RBC AUTO-ENTMCNC: 32.7 G/DL (ref 30–36.5)
MCV RBC AUTO: 96.1 FL (ref 80–99)
MONOCYTES # BLD: 0.9 K/UL (ref 0–1)
MONOCYTES NFR BLD: 13 % (ref 5–13)
NEUTS SEG # BLD: 3.3 K/UL (ref 1.8–8)
NEUTS SEG NFR BLD: 50 % (ref 32–75)
NRBC # BLD: 0 K/UL (ref 0–0.01)
NRBC BLD-RTO: 0 PER 100 WBC
PLATELET # BLD AUTO: 220 K/UL (ref 150–400)
PMV BLD AUTO: 9.5 FL (ref 8.9–12.9)
POTASSIUM SERPL-SCNC: 4.2 MMOL/L (ref 3.5–5.1)
PROT SERPL-MCNC: 6.7 G/DL (ref 6.4–8.2)
RBC # BLD AUTO: 3.37 M/UL (ref 3.8–5.2)
SODIUM SERPL-SCNC: 139 MMOL/L (ref 136–145)
WBC # BLD AUTO: 6.7 K/UL (ref 3.6–11)

## 2018-03-09 PROCEDURE — 77030038269 HC DRN EXT URIN PURWCK BARD -A

## 2018-03-09 PROCEDURE — 65270000029 HC RM PRIVATE

## 2018-03-09 PROCEDURE — 74011250636 HC RX REV CODE- 250/636: Performed by: ORTHOPAEDIC SURGERY

## 2018-03-09 PROCEDURE — 74011250637 HC RX REV CODE- 250/637: Performed by: FAMILY MEDICINE

## 2018-03-09 PROCEDURE — 97530 THERAPEUTIC ACTIVITIES: CPT

## 2018-03-09 PROCEDURE — 80053 COMPREHEN METABOLIC PANEL: CPT | Performed by: FAMILY MEDICINE

## 2018-03-09 PROCEDURE — 85025 COMPLETE CBC W/AUTO DIFF WBC: CPT | Performed by: FAMILY MEDICINE

## 2018-03-09 PROCEDURE — 36415 COLL VENOUS BLD VENIPUNCTURE: CPT | Performed by: FAMILY MEDICINE

## 2018-03-09 PROCEDURE — 74011250637 HC RX REV CODE- 250/637: Performed by: ORTHOPAEDIC SURGERY

## 2018-03-09 PROCEDURE — 74011250637 HC RX REV CODE- 250/637: Performed by: PHYSICIAN ASSISTANT

## 2018-03-09 PROCEDURE — 74011250636 HC RX REV CODE- 250/636: Performed by: FAMILY MEDICINE

## 2018-03-09 RX ADMIN — AMLODIPINE BESYLATE 10 MG: 5 TABLET ORAL at 08:47

## 2018-03-09 RX ADMIN — HEPARIN SODIUM 5000 UNITS: 5000 INJECTION, SOLUTION INTRAVENOUS; SUBCUTANEOUS at 05:57

## 2018-03-09 RX ADMIN — CEPHALEXIN 500 MG: 250 CAPSULE ORAL at 05:56

## 2018-03-09 RX ADMIN — LETROZOLE 2.5 MG: 2.5 TABLET, FILM COATED ORAL at 08:54

## 2018-03-09 RX ADMIN — OXYCODONE HYDROCHLORIDE AND ACETAMINOPHEN 1 TABLET: 7.5; 325 TABLET ORAL at 17:12

## 2018-03-09 RX ADMIN — Medication 10 ML: at 21:17

## 2018-03-09 RX ADMIN — Medication 10 ML: at 06:00

## 2018-03-09 RX ADMIN — CEPHALEXIN 500 MG: 250 CAPSULE ORAL at 13:19

## 2018-03-09 RX ADMIN — DULOXETINE HYDROCHLORIDE 20 MG: 20 CAPSULE, DELAYED RELEASE ORAL at 08:47

## 2018-03-09 RX ADMIN — HYDROCHLOROTHIAZIDE 25 MG: 25 TABLET ORAL at 08:47

## 2018-03-09 RX ADMIN — LORAZEPAM 1 MG: 2 INJECTION INTRAMUSCULAR; INTRAVENOUS at 22:49

## 2018-03-09 RX ADMIN — CARVEDILOL 12.5 MG: 12.5 TABLET, FILM COATED ORAL at 17:12

## 2018-03-09 RX ADMIN — GABAPENTIN 600 MG: 300 CAPSULE ORAL at 21:14

## 2018-03-09 RX ADMIN — HEPARIN SODIUM 5000 UNITS: 5000 INJECTION, SOLUTION INTRAVENOUS; SUBCUTANEOUS at 13:20

## 2018-03-09 RX ADMIN — GABAPENTIN 600 MG: 300 CAPSULE ORAL at 17:13

## 2018-03-09 RX ADMIN — HEPARIN SODIUM 5000 UNITS: 5000 INJECTION, SOLUTION INTRAVENOUS; SUBCUTANEOUS at 21:15

## 2018-03-09 RX ADMIN — LORAZEPAM 1 MG: 2 INJECTION INTRAMUSCULAR; INTRAVENOUS at 14:30

## 2018-03-09 RX ADMIN — OXYCODONE HYDROCHLORIDE AND ACETAMINOPHEN 1 TABLET: 7.5; 325 TABLET ORAL at 12:18

## 2018-03-09 RX ADMIN — Medication 100 MG: at 08:46

## 2018-03-09 RX ADMIN — GABAPENTIN 600 MG: 300 CAPSULE ORAL at 08:47

## 2018-03-09 RX ADMIN — Medication 10 ML: at 13:13

## 2018-03-09 RX ADMIN — FOLIC ACID 1 MG: 1 TABLET ORAL at 08:47

## 2018-03-09 RX ADMIN — SIMVASTATIN 20 MG: 20 TABLET, FILM COATED ORAL at 17:13

## 2018-03-09 RX ADMIN — LEVOTHYROXINE SODIUM 50 MCG: 50 TABLET ORAL at 05:57

## 2018-03-09 RX ADMIN — OXYCODONE HYDROCHLORIDE AND ACETAMINOPHEN 1 TABLET: 7.5; 325 TABLET ORAL at 22:49

## 2018-03-09 RX ADMIN — CARVEDILOL 12.5 MG: 12.5 TABLET, FILM COATED ORAL at 08:47

## 2018-03-09 RX ADMIN — THERA TABS 1 TABLET: TAB at 08:47

## 2018-03-09 RX ADMIN — LOSARTAN POTASSIUM 50 MG: 50 TABLET ORAL at 08:47

## 2018-03-09 RX ADMIN — OXYCODONE HYDROCHLORIDE AND ACETAMINOPHEN 1 TABLET: 7.5; 325 TABLET ORAL at 04:37

## 2018-03-09 RX ADMIN — CEPHALEXIN 500 MG: 250 CAPSULE ORAL at 21:14

## 2018-03-09 NOTE — PROGRESS NOTES
Problem: Mobility Impaired (Adult and Pediatric)  Goal: *Acute Goals and Plan of Care (Insert Text)  Physical Therapy Goals  Revised 3/6/2018- Goals upgraded as progress noted today  1. Patient will move from supine to sit and sit to supine and scoot up and down  in bed with modified independence within 7 day(s). 2.  Patient will transfer from bed to chair and chair to bed with supervision/set-up with RW, NWB RLE within 7 day(s). 3.  Patient will perform sit <>stand with modified independence within 7 day(s). 4.  Patient will ambulate with supervision/set-up for 10' or less with RW, NWB RLE within 7 day(s). Physical Therapy Goals  Initiated 3/3/2018  1. Patient will move from supine to sit and sit to supine  in bed with supervision within 7 day(s). MET 3/6/18 see above  2. Patient will transfer from bed to chair and chair to bed with minimal assistance using the least restrictive device within 7 day(s). Almost Achieved 3/6/18 see above  3. Patient will perform sit to stand with minimal assistance within 7 day(s). Met 3/6/18- see above  4. Patient will ambulate with minimal assistance for 25 feet with the least restrictive device within 7 day(s). NOT MET- HOLD at this time and Continue per orders transfers only       physical Therapy TREATMENT  Patient: Boyd Camp (28 y.o. female)  Date: 3/9/2018  Diagnosis: RIGHT ANKLE FRACTURE  Ankle fracture, right Ankle fracture, right  Procedure(s) (LRB):  ANKLE OPEN REDUCTION INTERNAL FIXATION (Right) 7 Days Post-Op  Precautions: Bed Alarm, Fall, NWB  Chart, physical therapy assessment, plan of care and goals were reviewed. ASSESSMENT:  Patient anxious for OOB. Much improved and able to SPT bed to recliner chair with cushion with minimal assist x 1. Patient with external fixator R ankle and maintains NWB on RLE. She continues to require cues for hand placement. Doing well - advanced to one person assistance so communication board updated.  Advised patient she may get OOB to chair or [facility] with RN staff and she should sit up in chair for all meals during day, at minimum. Patient verbalized understanding. RLE elevated with pillows. Chair alarm set. Continue daily to BID therapy as able. Will return for sitting and bed exercises as able later today. Part 2 ankle surgery planned for Wednesday March 14th. Progression toward goals:  []    Improving appropriately and progressing toward goals  [x]    Improving slowly and progressing toward goals  []    Not making progress toward goals and plan of care will be adjusted     PLAN:  Patient continues to benefit from skilled intervention to address the above impairments. Continue treatment per established plan of care. Discharge Recommendations:  Home Health  Further Equipment Recommendations for Discharge:  bedside commode, rolling walker and wheelchair 22 inch with elevating legrests ( previously requested)     SUBJECTIVE:   Patient stated It is hurting more today - will you ask RN to give me pain medicine.     OBJECTIVE DATA SUMMARY:   Critical Behavior:  Neurologic State: Appropriate for age, Alert  Orientation Level: Appropriate for age, Oriented X4  Cognition: Appropriate decision making, Appropriate for age attention/concentration, Appropriate safety awareness, Follows commands  Safety/Judgement: Awareness of environment, Insight into deficits  Functional Mobility Training:  Bed Mobility: MOD I supine to edge of bed                    Transfers:  Sit to Stand: Minimum assistance  Stand to Sit: Minimum assistance  Stand Pivot Transfers: Assist x1     Bed to Chair: Minimum assistance                    Balance:  Standing - Static: Constant support; Fair  Standing - Dynamic : Fair  Ambulation/Gait Training:  Distance (ft): 2 Feet (ft)  Assistive Device: Gait belt;Walker, rolling (external fixator)  Ambulation - Level of Assistance: Minimal assistance; Additional time;Assist x1        Gait Abnormalities: Antalgic  Right Side Weight Bearing: Non-weight bearing  Left Side Weight Bearing: As tolerated  Base of Support: Shift to left        Step Length: Left shortened            Pain:  Pain Scale 1: Numeric (0 - 10)  Pain Intensity 1: 3  Pain Location 1: Leg  Pain Orientation 1: Right  Pain Description 1: Aching  Pain Intervention(s) 1: Medication (see MAR)  Activity Tolerance:   fair  Please refer to the flowsheet for vital signs taken during this treatment.   After treatment:   [x]    Patient left in no apparent distress sitting up in chair  []    Patient left in no apparent distress in bed  [x]    Call bell left within reach  [x]    Nursing notified  []    Caregiver present  [x]    Chair alarm activated    COMMUNICATION/COLLABORATION:   The patients plan of care was discussed with: Registered Nurse, Natasha Landeros, PT   Time Calculation: 25 mins

## 2018-03-09 NOTE — PROGRESS NOTES
3/9/2018  12:53 PM  CM consult for DME noted. CM submitted referrals to [facility] and [facility] who denied. CM met with pt who reported she has a RW at home. CM requested pt's  bring the RW so PT can evaluate it. Pt expressed intent to have  borrow or purchase a BSC and wheelchair if possible. They will let CM know on Monday if they were able to secure this equipment.

## 2018-03-09 NOTE — PROGRESS NOTES
Family Practice Daily Progress Note: 3/9/2018  Aki Nugent MD    Assessment/Plan:   MARYJO (acute kidney injury) [facility]) (3/3/2018) vrs some degree of CKD -  likely due to volume depletion. Had vomiting, diuretic use. Improved     Hypokalemia (3/3/2018): resolved/monitor/recheck     HTN (hypertension), benign (3/3/2018): Continue Coreg. Resumed Losarten and HCTZ. Added Amlodipine.      Depression with anxiety (3/3/2018): resume Cymbalta     Dyslipidemia (3/3/2018): resume simvastatin      Ankle fracture, right (3/2/2018): ortho is attending. Per ortho.  Surgery planned for next Wednesday    Hx of ETOH abuse - Had to start CIWA protocol for a short time- resolved    Alcohol withdrawal as evidenced by confusion & agitation treated with IV Ativan 2mg and placed on CIWA protocol - resolved        Problem List:  Problem List as of 3/9/2018  Date Reviewed: 8/24/2016          Codes Class Noted - Resolved    MARYJO (acute kidney injury) ([facility] [address] ICD-10-CM: N17.9  ICD-9-CM: 584.9  3/3/2018 - Present        Hypokalemia due to loss of potassium ICD-10-CM: E87.6  ICD-9-CM: 276.8  3/3/2018 - Present        HTN (hypertension), benign ICD-10-CM: I10  ICD-9-CM: 401.1  3/3/2018 - Present        Depression with anxiety ICD-10-CM: F41.8  ICD-9-CM: 300.4  3/3/2018 - Present        Dyslipidemia ICD-10-CM: E78.5  ICD-9-CM: 272.4  3/3/2018 - Present        * (Principal)Ankle fracture, right ICD-10-CM: K74.076H  ICD-9-CM: 824.8  3/2/2018 - Present        Numbness and tingling of both legs below knees ICD-10-CM: R20.0, R20.2  ICD-9-CM: 782.0  11/30/2015 - Present        Numbness in feet ICD-10-CM: R20.0  ICD-9-CM: 782.0  10/23/2015 - Present        Burning sensation of feet ICD-10-CM: R20.8  ICD-9-CM: 782.0  10/23/2015 - Present        SAH [facility]subarachnoid hemorrhage) [facility] ICD-10-CM: I60.9  ICD-9-CM: 296  10/23/2015 - Present              Subjective:    62 yo WF, pt Dr Andrea Cochran in our group, admitted after fall and severe fx of right ankle requiring 2 stage surg repair, first repair done 3/2/18. Pt reports no syncope or dizziness prior to fall and no prodrome events. She did walk on the foot for 2 days post fall and had little pain due to her chronic LE neuropathy. We are asked to see her for her medical problems including elevated creat. She does report marked decrease in intake for 2 days since her fall. She has a hx of HTN which has been relatively well controlled outpt. She also has a hx hypothyroidism, LE neuropathy, depression with anxiety and hypothyroidism and reports she is compliant with her medications. In addition, she reports Alcoholism and reports she has been sober for [place] couple of months. \"  She is going to call her sponsor today as she is feeling additional stress from the fall and surg. Currently she is stable for follow up surg. 3/4: Had more agitation and confusion last night and had to start CIWA protocol. She is better this am but still having problems with word retrieval. She reports to me that her last drink was Monday but told Dr Jackie Luis it was months ago. Potassium still low at 2.9. Will replete with po.     3/5:  Agitation and confusion have resolved. K+ back up after repletion. Discussed with ortho. 3/6:  Feeling better this AM.  BP spiked last night and Amlodipine added and resumed her usual BP meds - BP now better. No further signs of ETOH withdrawal.      3/7: BP is much better. She slept well last night. No signs of withdrawal. Case management working on placement until her surgery. She is not safe to go home. Potassium a little low. 3/8:  No new complaints. K+ back up. BP ok. Medical problems stable. 3/9:  Little pain at this time. K+ ok. BP ok. Avani Villarreal discussion held about her ETOH and she is resolved to stop ETOH.      Past Medical History:   Diagnosis Date    Alcohol abuse     Anxiety     Depression     Fatigue     Hypertension     Memory loss     Vertigo     Visual disturbance      Past Surgical History:   Procedure Laterality Date    HX ANKLE FRACTURE TX Right 12/2016    HX APPENDECTOMY      HX ORTHOPAEDIC      right knee repair    HX OTHER SURGICAL  04/2016    double mastectomy     HX OTHER SURGICAL  06/2016    reconstructive surgery on her breasts    HX SHOULDER REPLACEMENT Right 11/2016    HX WRIST FRACTURE TX Left 12/2016     Social History     Social History    Marital status:      Spouse name: N/A    Number of children: N/A    Years of education: N/A     Occupational History    Not on file. Social History Main Topics    Smoking status: Never Smoker    Smokeless tobacco: Never Used    Alcohol use 21.0 oz/week     35 Glasses of wine per week      Comment: last drink was 2/27    Drug use: Not on file    Sexual activity: Not on file     Other Topics Concern    Not on file     Social History Narrative     Family History   Problem Relation Age of Onset    Cancer Mother     Other Father      Allergies   Allergen Reactions    Sulfa (Sulfonamide Antibiotics) Swelling and Other (comments)     Flushing and hot feeling   Facial swelling     Patient Active Problem List   Diagnosis Code    Numbness in feet R20.0    Burning sensation of feet R20.8    SA[facility]arachnoid hemorrhage) ([facility] I60.9    Numbness and tingling of both legs below knees R20.0, R20.2    Ankle fracture, right S82.891A    MARYJO (acute kidney injury) ([facility] [address] N17.9    Hypokalemia due to loss of potassium E87.6    HTN (hypertension), benign I10    Depression with anxiety F41.8    Dyslipidemia E78.5     Review of Systems:     Constitutional ROS: no fever, chills, rigors or night sweats; + for fatigue  Respiratory ROS: no cough, sputum, hemoptysis, dyspnea or pleuritic pain.   Cardiovascular ROS: no chest pain, palpitation, orthopnea, PND or syncope  Endocrine ROS: no polydispsia, polyuria, heat or cold intolerance   Gastrointestinal ROS: no dysphagia, abdominal pain, nausea, vomiting, diarrhea or bleeding. Genito-Urinary ROS: no dysuria, frequency, hematuria, retention, or flank pain  Musculoskeletal ROS: right ankle pain. Neurological ROS: no headache, confusion, diplopia, focal symptoms, tremor, or hallucinosis. LE neuropathy unchanged  Psychiatric ROS: ROS: feeling mod depression vrs sadness due to injury/fall she reports, no current anxiety, mood swings  Dermatological ROS: no rash, pruritis, or urticaria    Objective:   Physical Exam:     Visit Vitals    /60 (BP 1 Location: Left arm, BP Patient Position: At rest)    Pulse 89    Temp 99 °F (37.2 °C)    Resp 16    Ht 5' 7\" (1.702 m)    Wt 70.3 kg (154 lb 15.7 oz)    SpO2 94%    Breastfeeding No    BMI 24.27 kg/m2    O2 Flow Rate (L/min): 1.5 l/min O2 Device: Room air    Temp (24hrs), Av.6 °F (37 °C), Min:98.4 °F (36.9 °C), Max:99 °F (37.2 °C)    1901 - 700  In: -   Out: 900 [Urine:900]    07 -  190  In: -   Out: 1100 [Urine:1100]    General:  Alert, cooperative, no distress, appears stated age. Head:  Normocephalic, without obvious abnormality, atraumatic. Eyes:  Conjunctivae/corneas clear. PERRL, EOMs intact. Nose: Nares normal. Septum midline. Mucosa normal. No drainage or sinus tenderness. Throat: Lips, mucosa, and tongue moist..   Neck: Supple, symmetrical, trachea midline, no adenopathy, thyroid: no enlargement/tenderness/nodules, no carotid bruit and no JVD. Back:   Symmetric, no curvature. ROM normal. No CVA tenderness. Lungs:   Clear to auscultation bilaterally. Chest wall:  No tenderness or deformity. Heart:  Regular rate and rhythm, S1, S2 normal, no murmur, click, rub or gallop. Abdomen:   Soft, non-tender. Bowel sounds normal. No masses,  No organomegaly. Extremities: no cyanosis or edema. No calf tenderness or cords left. Right LE in splint and metal frame/external fixation; moves toes to command. Feet warm. Dressing dry.     Pulses: 2+ and symmetric all extremities. Skin: Skin color, texture, turgor normal. No rashes or lesions   Neurologic: CNII-XII intact. Alert and oriented X 3. Fine motor of hands and fingers normal.  No tremor.  equal.  No cogwheeling or rigidity. Gait not tested at this time. Sensation grossly normal to touch except LEs. Gross motor of extremities otherwise normal.       Data Review:   EXAM:  XR TIB/FIB RT, XR ANKLE RT MIN 3 V 3/2/18  INDICATION:  fx.  Right ankle deformity after a fall  COMPARISON: None. FINDINGS: AP and lateral  views of the right tibia and fibula. 3 views of the  right ankle. The patient is status post ORIF of the patella. There is mild medial compartment  osteophytosis in the knee. There is chronic deformity of the fibular neck. There  is a displaced fracture of the medial malleolus. The medial malleolus is 1.5 cm  from the distal tibia. There is a comminuted displaced fracture of the lateral  malleolus at the level of the tibiotalar joint. The talus and distal lateral  malleolus are dislocated laterally by 2.8 cm and slightly retracted. IMPRESSION: Bimalleolar ankle fracture dislocation    Recent Days:  Recent Labs      03/09/18   0447  03/08/18   0020  03/07/18   0118   WBC  6.7  7.1  6.4   HGB  10.6*  10.9*  10.2*   HCT  32.4*  32.7*  30.8*   PLT  220  197  167     Recent Labs      03/09/18   0447  03/08/18   0020  03/07/18   0118   NA  139  136  136   K  4.2  4.1  3.3*   CL  101  101  99   CO2  28  30  30   GLU  98  117*  110*   BUN  15  18  18   CREA  0.78  0.81  0.85   CA  9.1  8.9  8.8   ALB  3.0*  3.0*  2.9*   TBILI  0.4  0.4  0.3   SGOT  19  22  24   ALT  11*  12  13     No results for input(s): PH, PCO2, PO2, HCO3, FIO2 in the last 72 hours.     24 Hour Results:  Recent Results (from the past 24 hour(s))   CBC WITH AUTOMATED DIFF    Collection Time: 03/09/18  4:47 AM   Result Value Ref Range    WBC 6.7 3.6 - 11.0 K/uL    RBC 3.37 (L) 3.80 - 5.20 M/uL    HGB 10.6 (L) 11.5 - 16.0 g/dL    HCT 32.4 (L) 35.0 - 47.0 %    MCV 96.1 80.0 - 99.0 FL    MCH 31.5 26.0 - 34.0 PG    MCHC 32.7 30.0 - 36.5 g/dL    RDW 14.2 11.5 - 14.5 %    PLATELET 909 579 - 488 K/uL    MPV 9.5 8.9 - 12.9 FL    NRBC 0.0 0  WBC    ABSOLUTE NRBC 0.00 0.00 - 0.01 K/uL    NEUTROPHILS 50 32 - 75 %    LYMPHOCYTES 34 12 - 49 %    MONOCYTES 13 5 - 13 %    EOSINOPHILS 2 0 - 7 %    BASOPHILS 1 0 - 1 %    IMMATURE GRANULOCYTES 0 0.0 - 0.5 %    ABS. NEUTROPHILS 3.3 1.8 - 8.0 K/UL    ABS. LYMPHOCYTES 2.3 0.8 - 3.5 K/UL    ABS. MONOCYTES 0.9 0.0 - 1.0 K/UL    ABS. EOSINOPHILS 0.1 0.0 - 0.4 K/UL    ABS. BASOPHILS 0.0 0.0 - 0.1 K/UL    ABS. IMM. GRANS. 0.0 0.00 - 0.04 K/UL    DF AUTOMATED     METABOLIC PANEL, COMPREHENSIVE    Collection Time: 03/09/18  4:47 AM   Result Value Ref Range    Sodium 139 136 - 145 mmol/L    Potassium 4.2 3.5 - 5.1 mmol/L    Chloride 101 97 - 108 mmol/L    CO2 28 21 - 32 mmol/L    Anion gap 10 5 - 15 mmol/L    Glucose 98 65 - 100 mg/dL    BUN 15 6 - 20 MG/DL    Creatinine 0.78 0.55 - 1.02 MG/DL    BUN/Creatinine ratio 19 12 - 20      GFR est AA >60 >60 ml/min/1.73m2    GFR est non-AA >60 >60 ml/min/1.73m2    Calcium 9.1 8.5 - 10.1 MG/DL    Bilirubin, total 0.4 0.2 - 1.0 MG/DL    ALT (SGPT) 11 (L) 12 - 78 U/L    AST (SGOT) 19 15 - 37 U/L    Alk.  phosphatase 61 45 - 117 U/L    Protein, total 6.7 6.4 - 8.2 g/dL    Albumin 3.0 (L) 3.5 - 5.0 g/dL    Globulin 3.7 2.0 - 4.0 g/dL    A-G Ratio 0.8 (L) 1.1 - 2.2       Medications reviewed  Current Facility-Administered Medications   Medication Dose Route Frequency    letrozole (FEMARA) tablet 2.5 mg  2.5 mg Oral DAILY    cephALEXin (KEFLEX) capsule 500 mg  500 mg Oral Q8H    gabapentin (NEURONTIN) capsule 600 mg  600 mg Oral TID    diphenhydrAMINE (BENADRYL) capsule 25 mg  25 mg Oral Q6H PRN    diphenhydrAMINE (BENADRYL) injection 25 mg  25 mg IntraVENous Q6H PRN    traZODone (DESYREL) tablet 50 mg  50 mg Oral QHS PRN    traZODone (DESYREL) tablet 50 mg  50 mg Oral QHS PRN    hydroCHLOROthiazide (HYDRODIURIL) tablet 25 mg  25 mg Oral DAILY    losartan (COZAAR) tablet 50 mg  50 mg Oral DAILY    amLODIPine (NORVASC) tablet 10 mg  10 mg Oral DAILY    hydrALAZINE (APRESOLINE) 20 mg/mL injection 20 mg  20 mg IntraVENous Q4H PRN    prochlorperazine (COMPAZINE) tablet 10 mg  10 mg Oral Q6H PRN    therapeutic multivitamin (THERAGRAN) tablet 1 Tab  1 Tab Oral DAILY    carvedilol (COREG) tablet 12.5 mg  12.5 mg Oral BID WITH MEALS    DULoxetine (CYMBALTA) capsule 20 mg  20 mg Oral DAILY    levothyroxine (SYNTHROID) tablet 50 mcg  50 mcg Oral ACB    simvastatin (ZOCOR) tablet 20 mg  20 mg Oral DAILY    sodium chloride (NS) flush 5-10 mL  5-10 mL IntraVENous Q8H    sodium chloride (NS) flush 5-10 mL  5-10 mL IntraVENous PRN    heparin (porcine) injection 5,000 Units  5,000 Units SubCUTAneous Q8H    acetaminophen (TYLENOL) tablet 650 mg  650 mg Oral Q4H PRN    oxyCODONE-acetaminophen (PERCOCET 7.5) 7.5-325 mg per tablet 1 Tab  1 Tab Oral Q4H PRN    HYDROmorphone (PF) (DILAUDID) injection 1 mg  1 mg IntraVENous Q4H PRN    naloxone (NARCAN) injection 0.4 mg  0.4 mg IntraVENous PRN    folic acid (FOLVITE) tablet 1 mg  1 mg Oral DAILY    Thiamine Mononitrate (B-1) tablet 100 mg  100 mg Oral DAILY    LORazepam (ATIVAN) injection 1 mg  1 mg IntraVENous Q6H PRN    prochlorperazine (COMPAZINE) injection 5 mg  5 mg IntraVENous Q6H PRN     Care Plan discussed with: Patient/Family and Nurse  Total time spent with patient: 30 minutes.   Pepito Ocasio MD

## 2018-03-09 NOTE — PROGRESS NOTES
Wound care preformed to right ankle. Pin care completed. No redness, swelling, or drainage noted. Dressing reapplied. Patient tolerated without difficulty. Will continue to monitor.

## 2018-03-09 NOTE — PROGRESS NOTES
Bedside and Verbal shift change report given to Jennie (oncoming nurse) by [facility] (offgoing nurse). Report included the following information SBAR, Kardex, Procedure Summary, Intake/Output, MAR and Recent Results.

## 2018-03-10 PROCEDURE — 74011250637 HC RX REV CODE- 250/637: Performed by: ORTHOPAEDIC SURGERY

## 2018-03-10 PROCEDURE — 77030018836 HC SOL IRR NACL ICUM -A

## 2018-03-10 PROCEDURE — 74011250637 HC RX REV CODE- 250/637: Performed by: FAMILY MEDICINE

## 2018-03-10 PROCEDURE — 74011250636 HC RX REV CODE- 250/636: Performed by: FAMILY MEDICINE

## 2018-03-10 PROCEDURE — 74011250636 HC RX REV CODE- 250/636: Performed by: ORTHOPAEDIC SURGERY

## 2018-03-10 PROCEDURE — 74011250637 HC RX REV CODE- 250/637: Performed by: PHYSICIAN ASSISTANT

## 2018-03-10 PROCEDURE — 65270000029 HC RM PRIVATE

## 2018-03-10 PROCEDURE — 97530 THERAPEUTIC ACTIVITIES: CPT

## 2018-03-10 RX ADMIN — CEPHALEXIN 500 MG: 250 CAPSULE ORAL at 21:02

## 2018-03-10 RX ADMIN — CARVEDILOL 12.5 MG: 12.5 TABLET, FILM COATED ORAL at 07:19

## 2018-03-10 RX ADMIN — Medication 10 ML: at 21:02

## 2018-03-10 RX ADMIN — CARVEDILOL 12.5 MG: 12.5 TABLET, FILM COATED ORAL at 16:21

## 2018-03-10 RX ADMIN — FOLIC ACID 1 MG: 1 TABLET ORAL at 08:45

## 2018-03-10 RX ADMIN — HYDROCHLOROTHIAZIDE 25 MG: 25 TABLET ORAL at 08:45

## 2018-03-10 RX ADMIN — THERA TABS 1 TABLET: TAB at 08:45

## 2018-03-10 RX ADMIN — DULOXETINE HYDROCHLORIDE 20 MG: 20 CAPSULE, DELAYED RELEASE ORAL at 08:45

## 2018-03-10 RX ADMIN — LORAZEPAM 1 MG: 2 INJECTION INTRAMUSCULAR; INTRAVENOUS at 06:30

## 2018-03-10 RX ADMIN — AMLODIPINE BESYLATE 10 MG: 5 TABLET ORAL at 08:45

## 2018-03-10 RX ADMIN — Medication 10 ML: at 06:31

## 2018-03-10 RX ADMIN — TRAZODONE HYDROCHLORIDE 50 MG: 50 TABLET ORAL at 23:13

## 2018-03-10 RX ADMIN — HEPARIN SODIUM 5000 UNITS: 5000 INJECTION, SOLUTION INTRAVENOUS; SUBCUTANEOUS at 13:17

## 2018-03-10 RX ADMIN — GABAPENTIN 600 MG: 300 CAPSULE ORAL at 21:02

## 2018-03-10 RX ADMIN — GABAPENTIN 600 MG: 300 CAPSULE ORAL at 08:45

## 2018-03-10 RX ADMIN — LOSARTAN POTASSIUM 50 MG: 50 TABLET ORAL at 08:45

## 2018-03-10 RX ADMIN — HEPARIN SODIUM 5000 UNITS: 5000 INJECTION, SOLUTION INTRAVENOUS; SUBCUTANEOUS at 06:30

## 2018-03-10 RX ADMIN — LORAZEPAM 1 MG: 2 INJECTION INTRAMUSCULAR; INTRAVENOUS at 14:28

## 2018-03-10 RX ADMIN — DIPHENHYDRAMINE HYDROCHLORIDE 25 MG: 25 CAPSULE ORAL at 20:51

## 2018-03-10 RX ADMIN — CEPHALEXIN 500 MG: 250 CAPSULE ORAL at 06:30

## 2018-03-10 RX ADMIN — OXYCODONE HYDROCHLORIDE AND ACETAMINOPHEN 1 TABLET: 7.5; 325 TABLET ORAL at 16:56

## 2018-03-10 RX ADMIN — OXYCODONE HYDROCHLORIDE AND ACETAMINOPHEN 1 TABLET: 7.5; 325 TABLET ORAL at 20:51

## 2018-03-10 RX ADMIN — LORAZEPAM 1 MG: 2 INJECTION INTRAMUSCULAR; INTRAVENOUS at 20:51

## 2018-03-10 RX ADMIN — OXYCODONE HYDROCHLORIDE AND ACETAMINOPHEN 1 TABLET: 7.5; 325 TABLET ORAL at 06:30

## 2018-03-10 RX ADMIN — SIMVASTATIN 20 MG: 20 TABLET, FILM COATED ORAL at 18:00

## 2018-03-10 RX ADMIN — Medication 10 ML: at 19:20

## 2018-03-10 RX ADMIN — LETROZOLE 2.5 MG: 2.5 TABLET, FILM COATED ORAL at 08:45

## 2018-03-10 RX ADMIN — OXYCODONE HYDROCHLORIDE AND ACETAMINOPHEN 1 TABLET: 7.5; 325 TABLET ORAL at 12:32

## 2018-03-10 RX ADMIN — GABAPENTIN 600 MG: 300 CAPSULE ORAL at 15:11

## 2018-03-10 RX ADMIN — CEPHALEXIN 500 MG: 250 CAPSULE ORAL at 13:17

## 2018-03-10 RX ADMIN — LEVOTHYROXINE SODIUM 50 MCG: 50 TABLET ORAL at 06:30

## 2018-03-10 RX ADMIN — HEPARIN SODIUM 5000 UNITS: 5000 INJECTION, SOLUTION INTRAVENOUS; SUBCUTANEOUS at 21:01

## 2018-03-10 RX ADMIN — Medication 100 MG: at 08:45

## 2018-03-10 NOTE — PROGRESS NOTES
Bedside and Verbal shift change report given to Kaila RN (oncoming nurse) by Niranjan Zurita RN (offgoing nurse). Report included the following information SBAR, Kardex, OR Summary, Procedure Summary, Intake/Output, MAR and Recent Results.

## 2018-03-10 NOTE — PROGRESS NOTES
Family Practice Daily Progress Note: 3/10/2018  Fifi Cade MD    Assessment/Plan:   MARYJO on admit - resolved     Hypokalemia - resolved     HTN (hypertension), benign (3/3/2018): BP improved on current regimen     Depression with anxiety- Cymbalta     Dyslipidemia -  simvastatin      Ankle fracture, right - ortho is attending. Per ortho. Surgery planned for next Wednesday    Alcohol withdrawal as evidenced by confusion & agitation treated with IV Ativan 2mg and placed on CIWA protocol - resolved    Hx breast CA - continue femara    DVT proph - heparin subcu        Problem List:  Problem List as of 3/10/2018  Date Reviewed: 8/24/2016          Codes Class Noted - Resolved    MARYJO (acute kidney injury) ([facility] [address] ICD-10-CM: N17.9  ICD-9-CM: 584.9  3/3/2018 - Present        Hypokalemia due to loss of potassium ICD-10-CM: E87.6  ICD-9-CM: 276.8  3/3/2018 - Present        HTN (hypertension), benign ICD-10-CM: I10  ICD-9-CM: 401.1  3/3/2018 - Present        Depression with anxiety ICD-10-CM: F41.8  ICD-9-CM: 300.4  3/3/2018 - Present        Dyslipidemia ICD-10-CM: E78.5  ICD-9-CM: 272.4  3/3/2018 - Present        * (Principal)Ankle fracture, right ICD-10-CM: B51.022X  ICD-9-CM: 824.8  3/2/2018 - Present        Numbness and tingling of both legs below knees ICD-10-CM: R20.0, R20.2  ICD-9-CM: 782.0  11/30/2015 - Present        Numbness in feet ICD-10-CM: R20.0  ICD-9-CM: 782.0  10/23/2015 - Present        Burning sensation of feet ICD-10-CM: R20.8  ICD-9-CM: 782.0  10/23/2015 - Present        SAH (subarachnoid hemorrhage) (HCC) ICD-10-CM: I60.9  ICD-9-CM: 212  10/23/2015 - Present              Subjective:    60 yo WF, pt Dr Calderon Wooten in our group, admitted after fall and severe fx of right ankle requiring 2 stage surg repair, first repair done 3/2/18. Pt reports no syncope or dizziness prior to fall and no prodrome events.   She did walk on the foot for 2 days post fall and had little pain due to her chronic LE neuropathy. We are asked to see her for her medical problems including elevated creat. She does report marked decrease in intake for 2 days since her fall. She has a hx of HTN which has been relatively well controlled outpt. She also has a hx hypothyroidism, LE neuropathy, depression with anxiety and hypothyroidism and reports she is compliant with her medications. In addition, she reports Alcoholism and reports she has been sober for Lavera Child couple of months. \"  She is going to call her sponsor today as she is feeling additional stress from the fall and surg. Currently she is stable for follow up surg. 3/4: Had more agitation and confusion last night and had to start CIWA protocol. She is better this am but still having problems with word retrieval. She reports to me that her last drink was Monday but told Dr Jose Borrero it was months ago. Potassium still low at 2.9. Will replete with po.     3/5:  Agitation and confusion have resolved. K+ back up after repletion. Discussed with ortho. 3/6:  Feeling better this AM.  BP spiked last night and Amlodipine added and resumed her usual BP meds - BP now better. No further signs of ETOH withdrawal.      3/7: BP is much better. She slept well last night. No signs of withdrawal. Case management working on placement until her surgery. She is not safe to go home. Potassium a little low. 3/8:  No new complaints. K+ back up. BP ok. Medical problems stable. 3/9:  Little pain at this time. K+ ok. BP ok. Odalys Miranda discussion held about her ETOH and she is resolved to stop ETOH. 3/10: No acute complaints. Anxious about surgery. Denies CP, SOB. Pain in foot controlled. Tolerating po. +BM.     Past Medical History:   Diagnosis Date    Alcohol abuse     Anxiety     Depression     Fatigue     Hypertension     Memory loss     Vertigo     Visual disturbance      Past Surgical History:   Procedure Laterality Date    HX ANKLE FRACTURE TX Right 2016    HX APPENDECTOMY      HX ORTHOPAEDIC      right knee repair    HX OTHER SURGICAL  2016    double mastectomy     HX OTHER SURGICAL  2016    reconstructive surgery on her breasts    HX SHOULDER REPLACEMENT Right 2016    HX WRIST FRACTURE TX Left 2016     Social History     Social History    Marital status:      Spouse name: N/A    Number of children: N/A    Years of education: N/A     Occupational History    Not on file. Social History Main Topics    Smoking status: Never Smoker    Smokeless tobacco: Never Used    Alcohol use 21.0 oz/week     35 Glasses of wine per week      Comment: last drink was     Drug use: Not on file    Sexual activity: Not on file     Other Topics Concern    Not on file     Social History Narrative     Family History   Problem Relation Age of Onset    Cancer Mother     Other Father      Allergies   Allergen Reactions    Sulfa (Sulfonamide Antibiotics) Swelling and Other (comments)     Flushing and hot feeling   Facial swelling     Patient Active Problem List   Diagnosis Code    Numbness in feet R20.0    Burning sensation of feet R20.8    SAH [facility]subarachnoid hemorrhage) ([facility]) I60.9    Numbness and tingling of both legs below knees R20.0, R20.2    Ankle fracture, right S82.891A    MARYJO (acute kidney injury) ([facility] [address] N17.9    Hypokalemia due to loss of potassium E87.6    HTN (hypertension), benign I10    Depression with anxiety F41.8    Dyslipidemia E78.5     Review of Systems:   Neg 12 system ROS other than what is mentioned in subjective.     Objective:   Physical Exam:     Visit Vitals    /76 (BP 1 Location: Left arm, BP Patient Position: Head of bed elevated (Comment degrees))    Pulse 80    Temp 98.6 °F (37 °C)    Resp 19    Ht 5' 7\" (1.702 m)    Wt 70.3 kg (154 lb 15.7 oz)    SpO2 94%    Breastfeeding No    BMI 24.27 kg/m2    O2 Flow Rate (L/min): 1.5 l/min O2 Device: Room air    Temp (24hrs), Av.2 °F (36.8 °C), Min:97.6 °F (36.4 °C), Max:98.6 °F (37 °C)    03/10 0701 - 03/10 1900  In: -   Out: 030 [ZBFHP:584]   03/08 1901 - 03/10 0700  In: -   Out: 2650 [Urine:2650]    General:  Alert, cooperative, no distress, appears stated age. Head:  Normocephalic, without obvious abnormality, atraumatic. Eyes:  Conjunctivae/corneas clear. PERRL, EOMs intact. Nose: Nares normal. Septum midline. Throat: Lips, mucosa, and tongue moist..   Neck: Supple, symmetrical   Lungs:   Clear to auscultation bilaterally. Heart:  Regular rate and rhythm, S1, S2 normal, no murmur, click, rub or gallop. Abdomen:   Soft, non-tender. Bowel sounds normal. No masses,  No organomegaly. Extremities: no cyanosis or edema. Right LE in splint and metal frame/external fixation; moves toes to command. Feet warm. Dressing dry. Skin: Skin color, texture, turgor normal. No rashes or lesions   Neurologic: CNII-XII intact. Alert and oriented X 3. Data Review:   EXAM:  XR TIB/FIB RT, XR ANKLE RT MIN 3 V 3/2/18  INDICATION:  fx.  Right ankle deformity after a fall  COMPARISON: None. FINDINGS: AP and lateral  views of the right tibia and fibula. 3 views of the  right ankle. The patient is status post ORIF of the patella. There is mild medial compartment  osteophytosis in the knee. There is chronic deformity of the fibular neck. There  is a displaced fracture of the medial malleolus. The medial malleolus is 1.5 cm  from the distal tibia. There is a comminuted displaced fracture of the lateral  malleolus at the level of the tibiotalar joint. The talus and distal lateral  malleolus are dislocated laterally by 2.8 cm and slightly retracted.   IMPRESSION: Bimalleolar ankle fracture dislocation    Recent Days:  Recent Labs      03/09/18   0447  03/08/18   0020   WBC  6.7  7.1   HGB  10.6*  10.9*   HCT  32.4*  32.7*   PLT  220  197     Recent Labs      03/09/18   0447  03/08/18   0020   NA  139  136   K  4.2  4.1   CL  101  101   CO2  28  30   GLU 98  117*   BUN  15  18   CREA  0.78  0.81   CA  9.1  8.9   ALB  3.0*  3.0*   TBILI  0.4  0.4   SGOT  19  22   ALT  11*  12     No results for input(s): PH, PCO2, PO2, HCO3, FIO2 in the last 72 hours. 24 Hour Results:  No results found for this or any previous visit (from the past 24 hour(s)).   Medications reviewed  Current Facility-Administered Medications   Medication Dose Route Frequency    letrozole (FEMARA) tablet 2.5 mg  2.5 mg Oral DAILY    cephALEXin (KEFLEX) capsule 500 mg  500 mg Oral Q8H    gabapentin (NEURONTIN) capsule 600 mg  600 mg Oral TID    diphenhydrAMINE (BENADRYL) capsule 25 mg  25 mg Oral Q6H PRN    diphenhydrAMINE (BENADRYL) injection 25 mg  25 mg IntraVENous Q6H PRN    traZODone (DESYREL) tablet 50 mg  50 mg Oral QHS PRN    traZODone (DESYREL) tablet 50 mg  50 mg Oral QHS PRN    hydroCHLOROthiazide (HYDRODIURIL) tablet 25 mg  25 mg Oral DAILY    losartan (COZAAR) tablet 50 mg  50 mg Oral DAILY    amLODIPine (NORVASC) tablet 10 mg  10 mg Oral DAILY    hydrALAZINE (APRESOLINE) 20 mg/mL injection 20 mg  20 mg IntraVENous Q4H PRN    prochlorperazine (COMPAZINE) tablet 10 mg  10 mg Oral Q6H PRN    therapeutic multivitamin (THERAGRAN) tablet 1 Tab  1 Tab Oral DAILY    carvedilol (COREG) tablet 12.5 mg  12.5 mg Oral BID WITH MEALS    DULoxetine (CYMBALTA) capsule 20 mg  20 mg Oral DAILY    levothyroxine (SYNTHROID) tablet 50 mcg  50 mcg Oral ACB    simvastatin (ZOCOR) tablet 20 mg  20 mg Oral DAILY    sodium chloride (NS) flush 5-10 mL  5-10 mL IntraVENous Q8H    sodium chloride (NS) flush 5-10 mL  5-10 mL IntraVENous PRN    heparin (porcine) injection 5,000 Units  5,000 Units SubCUTAneous Q8H    acetaminophen (TYLENOL) tablet 650 mg  650 mg Oral Q4H PRN    oxyCODONE-acetaminophen (PERCOCET 7.5) 7.5-325 mg per tablet 1 Tab  1 Tab Oral Q4H PRN    HYDROmorphone (PF) (DILAUDID) injection 1 mg  1 mg IntraVENous Q4H PRN    naloxone (NARCAN) injection 0.4 mg  0.4 mg IntraVENous PRN    folic acid (FOLVITE) tablet 1 mg  1 mg Oral DAILY    Thiamine Mononitrate (B-1) tablet 100 mg  100 mg Oral DAILY    LORazepam (ATIVAN) injection 1 mg  1 mg IntraVENous Q6H PRN    prochlorperazine (COMPAZINE) injection 5 mg  5 mg IntraVENous Q6H PRN       Maria Dolores Leonardo MD

## 2018-03-10 NOTE — PROGRESS NOTES
Pt in bed with rle elevated on pillows, no s/s of distress noted, will do drsg change to rle at 1500 today, will cont to monitor.

## 2018-03-10 NOTE — PROGRESS NOTES
Problem: Mobility Impaired (Adult and Pediatric)  Goal: *Acute Goals and Plan of Care (Insert Text)  Physical Therapy Goals  Revised 3/6/2018- Goals upgraded as progress noted today  1. Patient will move from supine to sit and sit to supine and scoot up and down  in bed with modified independence within 7 day(s). 2.  Patient will transfer from bed to chair and chair to bed with supervision/set-up with RW, NWB RLE within 7 day(s). 3.  Patient will perform sit <>stand with modified independence within 7 day(s). 4.  Patient will ambulate with supervision/set-up for 10' or less with RW, NWB RLE within 7 day(s). Physical Therapy Goals  Initiated 3/3/2018  1. Patient will move from supine to sit and sit to supine  in bed with supervision within 7 day(s). MET 3/6/18 see above  2. Patient will transfer from bed to chair and chair to bed with minimal assistance using the least restrictive device within 7 day(s). Almost Achieved 3/6/18 see above  3. Patient will perform sit to stand with minimal assistance within 7 day(s). Met 3/6/18- see above  4. Patient will ambulate with minimal assistance for 25 feet with the least restrictive device within 7 day(s). NOT MET- HOLD at this time and Continue per orders transfers only       physical Therapy TREATMENT  Patient: Carine Ludwig (89 y.o. female)  Date: 3/10/2018  Diagnosis: RIGHT ANKLE FRACTURE  Ankle fracture, right Ankle fracture, right  Procedure(s) (LRB):  ANKLE OPEN REDUCTION INTERNAL FIXATION (Right) 8 Days Post-Op  Precautions: Bed Alarm, Fall, NWB  Chart, physical therapy assessment, plan of care and goals were reviewed. ASSESSMENT:  Patient agreeable to OOB. Patient reports not getting up to the chair for her meals today but earlier today patient was able to transfer to the bedside commode and back to bed with nursing assistance -  communication board updated. Much improved and able to SPT to wheelchair with min assist of one.    then pushed patient in the hallways until patient was ready to return to room and perform transfer training to the bedside chair with cushion with minimal assist x 1 and using the rolling walker. Patient with external fixator R ankle and maintains NWB on RLE. She continues to require cues for hand placement. RLE elevated with pillows  Progression toward goals:  [x]    Improving appropriately and progressing toward goals  []    Improving slowly and progressing toward goals  []    Not making progress toward goals and plan of care will be adjusted     PLAN:  Patient continues to benefit from skilled intervention to address the above impairments. Continue treatment per established plan of care. Discharge Recommendations: To Be Determined  Further Equipment Recommendations for Discharge:  Already ordered     SUBJECTIVE:   Patient stated I don't want to fall.     OBJECTIVE DATA SUMMARY:   Critical Behavior:  Neurologic State: Alert, Appropriate for age  Orientation Level: Oriented X4  Cognition: Appropriate decision making, Appropriate for age attention/concentration, Appropriate safety awareness, Follows commands  Safety/Judgement: Awareness of environment, Insight into deficits  Functional Mobility Training:  Bed Mobility:     Supine to Sit: Stand-by assistance              Transfers:  Sit to Stand: Minimum assistance;Assist x1;Additional time  Stand to Sit: Minimum assistance;Assist x1;Additional time                             Balance:     Ambulation/Gait Training:                                                        Stairs:              Pain:                    Activity Tolerance:   Limited by non weightbearing  Please refer to the flowsheet for vital signs taken during this treatment.   After treatment:   [x]    Patient left in no apparent distress sitting up in chair  []    Patient left in no apparent distress in bed  [x]    Call bell left within reach  [x]    Nursing notified  []    Caregiver present  []    Bed alarm activated    COMMUNICATION/COLLABORATION:   The patients plan of care was discussed with: Registered Nurse    Ghada Narayanan, PT   Time Calculation: 17 mins

## 2018-03-10 NOTE — PROGRESS NOTES
Orthopaedic Progress Note  Post Op day: 8 Days Post-Op    March 10, 2018 10:39 AM     Patient: Best Garcia MRN: [id number]  SSN: [id number]    YOB: 1955  Age: 61 y.o. Sex: female      Admit date:  3/2/2018  Date of Surgery:  3/2/2018   Procedures:  Procedure(s):  ANKLE OPEN REDUCTION INTERNAL FIXATION  Admitting Physician:  Florentin Bazan MD   Surgeon:  Jessica Stanley) and Role:     * Chriss Sahni MD - Primary    Consulting Physician(s): Treatment Team: Attending Provider: Florentin Bazan MD; Consulting Provider: Chriss Sahni MD; Physician: Florentin Bazan MD; Utilization Review: Reynold Carver; Surgeon: Nathalie Broderick MD; Care Manager: Mavis Dillon    SUBJECTIVE:     Best Garcia is a 61 y.o. female is 8 Days Post-Op s/p Procedure(s):  ANKLE OPEN REDUCTION INTERNAL FIXATION with an appropriate level of post-operative pain. No complaints of nausea, vomiting, dizziness, lightheadedness, chest pain, or shortness of breath. OBJECTIVE:       Physical Exam:  General: Alert, cooperative, no distress. Respiratory: Respirations unlabored  Neurological:  Neurovascular exam within normal limits. Motor: + DF/PF. Musculoskeletal: Calves soft, supple, non-tender upon palpation. Dressing/Wound:  Clean, dry and intact. No significant erythema or swelling.       Vital Signs:      Patient Vitals for the past 8 hrs:   BP Temp Pulse Resp SpO2   03/10/18 0828 118/74 98.5 °F (36.9 °C) 90 18 93 %   03/10/18 0420 119/76 98.6 °F (37 °C) 80 19 94 %                                          Temp (24hrs), Av.3 °F (36.8 °C), Min:97.8 °F (36.6 °C), Max:98.6 °F (37 °C)      Labs:        Recent Labs      18   0447   HCT  32.4*   HGB  10.6*     Lab Results   Component Value Date/Time    Sodium 139 2018 04:47 AM    Potassium 4.2 2018 04:47 AM    Chloride 101 2018 04:47 AM    CO2 28 2018 04:47 AM    Glucose 98 2018 04:47 AM    BUN 15 03/09/2018 04:47 AM    Creatinine 0.78 03/09/2018 04:47 AM    Calcium 9.1 03/09/2018 04:47 AM       PT/OT:        Gait  Base of Support: Shift to left  Speed/Carri: Pace decreased (<100 feet/min)  Step Length: Left shortened  Gait Abnormalities: Antalgic  Ambulation - Level of Assistance: Minimal assistance, Additional time, Assist x1  Distance (ft): 2 Feet (ft)  Assistive Device: Gait belt, Walker, rolling (external fixator)  Stairs - Level of Assistance:  (NT and not applicable)       Patient mobility  Bed Mobility Training  Rolling:  (NT)  Supine to Sit: Stand-by assistance  Sit to Supine: Minimum assistance  Scooting: Stand-by assistance  Transfer Training  Sit to Stand: Minimum assistance  Stand to Sit: Minimum assistance  Stand Pivot Transfers: Minimal assistance  Bed to Chair: Minimum assistance  Sliding Board : Assist x1  Level of Assistance: Maximum assistance      Gait Training  Assistive Device: Gait belt, Walker, rolling (external fixator)  Ambulation - Level of Assistance: Minimal assistance, Additional time, Assist x1  Distance (ft): 2 Feet (ft)  Stairs - Level of Assistance:  (NT and not applicable)   Weight Bearing Status  Right Side Weight Bearing: Non-weight bearing  Left Side Weight Bearing: As tolerated        ASSESSMENT / PLAN:   Principal Problem:    Ankle fracture, right (3/2/2018)    Active Problems:    MARYJO (acute kidney injury) ([facility] [address] (3/3/2018)      Hypokalemia due to loss of potassium (3/3/2018)      HTN (hypertension), benign (3/3/2018)      Depression with anxiety (3/3/2018)      Dyslipidemia (3/3/2018)                    Pain Control: Adequate with oral agents    DVT Prophylaxis: Heparin daily, SCDs,    Therapy/ Weight bearing: NWB RLE   Wound/ incisional issues: Pin care and medal mal wound without s/sx infection   Medical concerns: Plan for surgery on Wednesday   Disposition: See Case Management reports     Swelling doing well, subsiding.  NO drainage from medial mal open fx site.      Signed By:  Moisés Montelongo
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
## 2018-03-11 PROCEDURE — 74011250636 HC RX REV CODE- 250/636: Performed by: PHYSICIAN ASSISTANT

## 2018-03-11 PROCEDURE — 65270000029 HC RM PRIVATE

## 2018-03-11 PROCEDURE — 74011250637 HC RX REV CODE- 250/637: Performed by: PHYSICIAN ASSISTANT

## 2018-03-11 PROCEDURE — 74011250637 HC RX REV CODE- 250/637: Performed by: FAMILY MEDICINE

## 2018-03-11 PROCEDURE — 74011250636 HC RX REV CODE- 250/636: Performed by: ORTHOPAEDIC SURGERY

## 2018-03-11 PROCEDURE — 97110 THERAPEUTIC EXERCISES: CPT

## 2018-03-11 PROCEDURE — 74011250637 HC RX REV CODE- 250/637: Performed by: ORTHOPAEDIC SURGERY

## 2018-03-11 PROCEDURE — 97530 THERAPEUTIC ACTIVITIES: CPT

## 2018-03-11 PROCEDURE — 74011250636 HC RX REV CODE- 250/636: Performed by: FAMILY MEDICINE

## 2018-03-11 RX ADMIN — CARVEDILOL 12.5 MG: 12.5 TABLET, FILM COATED ORAL at 07:32

## 2018-03-11 RX ADMIN — Medication 10 ML: at 05:22

## 2018-03-11 RX ADMIN — GABAPENTIN 600 MG: 300 CAPSULE ORAL at 16:29

## 2018-03-11 RX ADMIN — OXYCODONE HYDROCHLORIDE AND ACETAMINOPHEN 1 TABLET: 7.5; 325 TABLET ORAL at 13:02

## 2018-03-11 RX ADMIN — THERA TABS 1 TABLET: TAB at 08:11

## 2018-03-11 RX ADMIN — FOLIC ACID 1 MG: 1 TABLET ORAL at 08:11

## 2018-03-11 RX ADMIN — AMLODIPINE BESYLATE 10 MG: 5 TABLET ORAL at 08:11

## 2018-03-11 RX ADMIN — LOSARTAN POTASSIUM 50 MG: 50 TABLET ORAL at 08:11

## 2018-03-11 RX ADMIN — Medication 10 ML: at 21:44

## 2018-03-11 RX ADMIN — GABAPENTIN 600 MG: 300 CAPSULE ORAL at 08:11

## 2018-03-11 RX ADMIN — Medication 10 ML: at 17:40

## 2018-03-11 RX ADMIN — LORAZEPAM 1 MG: 2 INJECTION INTRAMUSCULAR; INTRAVENOUS at 05:21

## 2018-03-11 RX ADMIN — CEPHALEXIN 500 MG: 250 CAPSULE ORAL at 14:02

## 2018-03-11 RX ADMIN — OXYCODONE HYDROCHLORIDE AND ACETAMINOPHEN 1 TABLET: 7.5; 325 TABLET ORAL at 17:39

## 2018-03-11 RX ADMIN — GABAPENTIN 600 MG: 300 CAPSULE ORAL at 21:39

## 2018-03-11 RX ADMIN — HEPARIN SODIUM 5000 UNITS: 5000 INJECTION, SOLUTION INTRAVENOUS; SUBCUTANEOUS at 21:40

## 2018-03-11 RX ADMIN — TRAZODONE HYDROCHLORIDE 50 MG: 50 TABLET ORAL at 00:50

## 2018-03-11 RX ADMIN — DULOXETINE HYDROCHLORIDE 20 MG: 20 CAPSULE, DELAYED RELEASE ORAL at 08:11

## 2018-03-11 RX ADMIN — CEPHALEXIN 500 MG: 250 CAPSULE ORAL at 05:23

## 2018-03-11 RX ADMIN — LEVOTHYROXINE SODIUM 50 MCG: 50 TABLET ORAL at 06:37

## 2018-03-11 RX ADMIN — OXYCODONE HYDROCHLORIDE AND ACETAMINOPHEN 1 TABLET: 7.5; 325 TABLET ORAL at 09:24

## 2018-03-11 RX ADMIN — CEPHALEXIN 500 MG: 250 CAPSULE ORAL at 21:39

## 2018-03-11 RX ADMIN — Medication 100 MG: at 08:12

## 2018-03-11 RX ADMIN — CARVEDILOL 12.5 MG: 12.5 TABLET, FILM COATED ORAL at 16:29

## 2018-03-11 RX ADMIN — OXYCODONE HYDROCHLORIDE AND ACETAMINOPHEN 1 TABLET: 7.5; 325 TABLET ORAL at 05:23

## 2018-03-11 RX ADMIN — LETROZOLE 2.5 MG: 2.5 TABLET, FILM COATED ORAL at 08:11

## 2018-03-11 RX ADMIN — HEPARIN SODIUM 5000 UNITS: 5000 INJECTION, SOLUTION INTRAVENOUS; SUBCUTANEOUS at 05:21

## 2018-03-11 RX ADMIN — LORAZEPAM 1 MG: 2 INJECTION INTRAMUSCULAR; INTRAVENOUS at 21:43

## 2018-03-11 RX ADMIN — SIMVASTATIN 20 MG: 20 TABLET, FILM COATED ORAL at 17:39

## 2018-03-11 RX ADMIN — HYDROCHLOROTHIAZIDE 25 MG: 25 TABLET ORAL at 08:12

## 2018-03-11 RX ADMIN — OXYCODONE HYDROCHLORIDE AND ACETAMINOPHEN 1 TABLET: 7.5; 325 TABLET ORAL at 21:39

## 2018-03-11 NOTE — PROGRESS NOTES
Subjective:    Jayne Quinteros is a 61 y.o. female s/p exfix and I&D of RIGHT open ankle fx/dislocation    Major Events:. Pain well controlled, skin healing    Objective:    Vital signs in last 24 hours:    Temp:  [97.6 °F (36.4 °C)-99.1 °F (37.3 °C)]   Pulse (Heart Rate):  [74-96]   BP: ()/(53-84)   Resp Rate:  [15-20]   O2 Sat (%):  [92 %-97 %]   Weight:  [70.3 kg (154 lb 15.7 oz)]     Temp (24hrs), Av.5 °F (36.9 °C), Min:97.9 °F (36.6 °C), Max:99.1 °F (37.3 °C)      Labs:    Lab Results   Component Value Date/Time    WBC 6.7 2018 04:47 AM    HGB 10.6 (L) 2018 04:47 AM    HCT 32.4 (L) 2018 04:47 AM    PLATELET 785  04:47 AM       Lab Results   Component Value Date/Time    Sodium 139 2018 04:47 AM    Potassium 4.2 2018 04:47 AM    Chloride 101 2018 04:47 AM    CO2 28 2018 04:47 AM    BUN 15 2018 04:47 AM       Physical Exam:    General: alert, cooperative, in NAD  Nonlabored resp    RIGHT Lower extremity    Dressing: dressing taken down, pin sites clean    Motor: + ankle df/pf in ex fix    Sensory: does not have protective sensation    Vascular: Brisk capillary refill in toes  Skin: medial incision healing well, no erythema/drainage  + skin wrinkles    Assessment/Plan:    Plan for surgery tomorrow 3/12/2018 for RIGHT ankle removal of ex-fix and ankle arthrodesis vs. ORIF  I discussed the risks and benefits of both operations with her at length today and answered all of her questions to her satisfaction. She is leaning towards arthrodesis and will make a decision after talking with her . Please keep NPO after MN  Hold anticoagulation tomorrow am  Plan for surgery tomorrow evening after 5pm    Benton Nguyễn MD

## 2018-03-11 NOTE — PROGRESS NOTES
Problem: Mobility Impaired (Adult and Pediatric)  Goal: *Acute Goals and Plan of Care (Insert Text)  Physical Therapy Goals  Revised 3/6/2018- Goals upgraded as progress noted today  1. Patient will move from supine to sit and sit to supine and scoot up and down  in bed with modified independence within 7 day(s). 2.  Patient will transfer from bed to chair and chair to bed with supervision/set-up with RW, NWB RLE within 7 day(s). 3.  Patient will perform sit <>stand with modified independence within 7 day(s). 4.  Patient will ambulate with supervision/set-up for 10' or less with RW, NWB RLE within 7 day(s). Physical Therapy Goals  Initiated 3/3/2018  1. Patient will move from supine to sit and sit to supine  in bed with supervision within 7 day(s). MET 3/6/18 see above  2. Patient will transfer from bed to chair and chair to bed with minimal assistance using the least restrictive device within 7 day(s). Almost Achieved 3/6/18 see above  3. Patient will perform sit to stand with minimal assistance within 7 day(s). Met 3/6/18- see above  4. Patient will ambulate with minimal assistance for 25 feet with the least restrictive device within 7 day(s). NOT MET- HOLD at this time and Continue per orders transfers only       physical Therapy TREATMENT  Patient: Crystal Mitchell (75 y.o. female)  Date: 3/11/2018  Diagnosis: RIGHT ANKLE FRACTURE  Ankle fracture, right  ANKLE FRACTURE Ankle fracture, right  Procedure(s) (LRB):  ANKLE OPEN REDUCTION INTERNAL FIXATION (Right) 9 Days Post-Op  Precautions: Bed Alarm, Fall, NWB  Chart, physical therapy assessment, plan of care and goals were reviewed. ASSESSMENT:  Pt received in  Bed, agreeable to participate with physical therapy,  at bedside. Pt reports surgery scheduled for 3/12/14. SBA for bed mobility >sitting EOB. CGA for sit<>stand using RW. Performed stand pivot transfers bed<>w/c (large blue w/c) maintaining NWB with CGA and increased time.  Improved technique with minimal cues for proper hand placement. Progression toward goals:  []    Improving appropriately and progressing toward goals  [x]    Improving slowly and progressing toward goals  []    Not making progress toward goals and plan of care will be adjusted     PLAN:  Patient continues to benefit from skilled intervention to address the above impairments. Continue treatment per established plan of care. Discharge Recommendations:  Home Health  Further Equipment Recommendations for Discharge: ordered already     SUBJECTIVE:   Patient stated this is overwhelming.     OBJECTIVE DATA SUMMARY:   Critical Behavior:  Neurologic State: Alert  Orientation Level: Oriented X4  Cognition: Appropriate for age attention/concentration, Appropriate safety awareness  Safety/Judgement: Awareness of environment, Insight into deficits  Functional Mobility Training:  Bed Mobility:     Supine to Sit: Stand-by assistance  Sit to Supine: Contact guard assistance           Transfers:  Sit to Stand: Contact guard assistance  Stand to Sit: Contact guard assistance     Stand Pivot Transfers: Contact guard assistance  Bed to Chair: Contact guard assistance; Additional time                    Balance:  Sitting: Intact  Standing: Impaired; With support  Standing - Static: Constant support; Fair  Standing - Dynamic : Fair  Ambulation/Gait Training:                                                        Stairs:              Neuro Re-Education:    Therapeutic Exercises:   HEP including BLE  SLR,  heel slide,LAQ    LLE  ankle pumps  Pain:                    Activity Tolerance:   Good  Please refer to the flowsheet for vital signs taken during this treatment.   After treatment:   []    Patient left in no apparent distress sitting up in chair  [x]    Patient left in no apparent distress in bed  [x]    Call bell left within reach  [x]    Nursing notified  [x]    Caregiver present  []    Bed alarm activated    COMMUNICATION/COLLABORATION: The patients plan of care was discussed with: Registered Nurse    Reece Parrish   Time Calculation: 23 mins

## 2018-03-11 NOTE — PROGRESS NOTES
Family Practice Daily Progress Note: 3/11/2018  Jessica Tao MD    Assessment/Plan:   MARYJO on admit - resolved, recheck labs in AM     Hypokalemia - resolved     HTN (hypertension), benign (3/3/2018): BP improved on current regimen     Depression with anxiety- Cymbalta     Dyslipidemia -  simvastatin      Ankle fracture, right - ortho is attending. Per ortho. --Surgery planned for tomorrow 3/12    Alcohol withdrawal as evidenced by confusion & agitation treated with IV Ativan 2mg and placed on CIWA protocol - resolved    Hx breast CA - continue femara    DVT proph - heparin subcu        Problem List:  Problem List as of 3/11/2018  Date Reviewed: 8/24/2016          Codes Class Noted - Resolved    MARYJO (acute kidney injury) ([facility] [address] ICD-10-CM: N17.9  ICD-9-CM: 584.9  3/3/2018 - Present        Hypokalemia due to loss of potassium ICD-10-CM: E87.6  ICD-9-CM: 276.8  3/3/2018 - Present        HTN (hypertension), benign ICD-10-CM: I10  ICD-9-CM: 401.1  3/3/2018 - Present        Depression with anxiety ICD-10-CM: F41.8  ICD-9-CM: 300.4  3/3/2018 - Present        Dyslipidemia ICD-10-CM: E78.5  ICD-9-CM: 272.4  3/3/2018 - Present        * (Principal)Ankle fracture, right ICD-10-CM: L27.410M  ICD-9-CM: 824.8  3/2/2018 - Present        Numbness and tingling of both legs below knees ICD-10-CM: R20.0, R20.2  ICD-9-CM: 782.0  11/30/2015 - Present        Numbness in feet ICD-10-CM: R20.0  ICD-9-CM: 782.0  10/23/2015 - Present        Burning sensation of feet ICD-10-CM: R20.8  ICD-9-CM: 782.0  10/23/2015 - Present        SAH (subarachnoid hemorrhage) (HCC) ICD-10-CM: I60.9  ICD-9-CM: 231  10/23/2015 - Present              Subjective:    60 yo WF, pt Dr Evalene Carte in our group, admitted after fall and severe fx of right ankle requiring 2 stage surg repair, first repair done 3/2/18. Pt reports no syncope or dizziness prior to fall and no prodrome events.   She did walk on the foot for 2 days post fall and had little pain due to her chronic LE neuropathy. We are asked to see her for her medical problems including elevated creat. She does report marked decrease in intake for 2 days since her fall. She has a hx of HTN which has been relatively well controlled outpt. She also has a hx hypothyroidism, LE neuropathy, depression with anxiety and hypothyroidism and reports she is compliant with her medications. In addition, she reports Alcoholism and reports she has been sober for Jeraline Muff couple of months. \"  She is going to call her sponsor today as she is feeling additional stress from the fall and surg. Currently she is stable for follow up surg. 3/4: Had more agitation and confusion last night and had to start CIWA protocol. She is better this am but still having problems with word retrieval. She reports to me that her last drink was Monday but told Dr Juli Quinonez it was months ago. Potassium still low at 2.9. Will replete with po.     3/5:  Agitation and confusion have resolved. K+ back up after repletion. Discussed with ortho. 3/6:  Feeling better this AM.  BP spiked last night and Amlodipine added and resumed her usual BP meds - BP now better. No further signs of ETOH withdrawal.      3/7: BP is much better. She slept well last night. No signs of withdrawal. Case management working on placement until her surgery. She is not safe to go home. Potassium a little low. 3/8:  No new complaints. K+ back up. BP ok. Medical problems stable. 3/9:  Little pain at this time. K+ ok. BP ok. Vera Torres discussion held about her ETOH and she is resolved to stop ETOH. 3/10: No acute complaints. Anxious about surgery. Denies CP, SOB. Pain in foot controlled. Tolerating po. +BM. 3/11: No acute events overnight. Notes that swelling in foot has diminished.     Past Medical History:   Diagnosis Date    Alcohol abuse     Anxiety     Depression     Fatigue     Hypertension     Memory loss     Vertigo     Visual disturbance      Past Surgical History:   Procedure Laterality Date    HX ANKLE FRACTURE TX Right 12/2016    HX APPENDECTOMY      HX ORTHOPAEDIC      right knee repair    HX OTHER SURGICAL  04/2016    double mastectomy     HX OTHER SURGICAL  06/2016    reconstructive surgery on her breasts    HX SHOULDER REPLACEMENT Right 11/2016    HX WRIST FRACTURE TX Left 12/2016     Social History     Social History    Marital status:      Spouse name: N/A    Number of children: N/A    Years of education: N/A     Occupational History    Not on file. Social History Main Topics    Smoking status: Never Smoker    Smokeless tobacco: Never Used    Alcohol use 21.0 oz/week     35 Glasses of wine per week      Comment: last drink was 2/27    Drug use: Not on file    Sexual activity: Not on file     Other Topics Concern    Not on file     Social History Narrative     Family History   Problem Relation Age of Onset    Cancer Mother     Other Father      Allergies   Allergen Reactions    Sulfa (Sulfonamide Antibiotics) Swelling and Other (comments)     Flushing and hot feeling   Facial swelling     Patient Active Problem List   Diagnosis Code    Numbness in feet R20.0    Burning sensation of feet R20.8    SAH (subarachnoid hemorrhage) (formerly [facility]) I60.9    Numbness and tingling of both legs below knees R20.0, R20.2    Ankle fracture, right S82.891A    MARYJO (acute kidney injury) ([facility] [address] N17.9    Hypokalemia due to loss of potassium E87.6    HTN (hypertension), benign I10    Depression with anxiety F41.8    Dyslipidemia E78.5     Review of Systems:   Neg 12 system ROS other than what is mentioned in subjective.     Objective:   Physical Exam:     Visit Vitals    /70 (BP 1 Location: Left arm, BP Patient Position: At rest)    Pulse 81    Temp 98.5 °F (36.9 °C)    Resp 15    Ht 5' 7\" (1.702 m)    Wt 70.3 kg (154 lb 15.7 oz)    SpO2 94%    Breastfeeding No    BMI 24.27 kg/m2    O2 Flow Rate (L/min): 1.5 l/min O2 Device: Room air    Temp (24hrs), Av.5 °F (36.9 °C), Min:97.9 °F (36.6 °C), Max:99.1 °F (37.3 °C)        1901 -  0700  In: -   Out: 9125 [Urine:3225]    General:  Alert, cooperative, no distress, appears stated age. Head:  Normocephalic, without obvious abnormality, atraumatic. Eyes:  Conjunctivae/corneas clear. PERRL, EOMs intact. Nose: Nares normal. Septum midline. Throat: Lips, mucosa, and tongue moist..   Neck: Supple, symmetrical   Lungs:   Clear to auscultation bilaterally. Heart:  Regular rate and rhythm, S1, S2 normal, no murmur, click, rub or gallop. Abdomen:   Soft, non-tender. Bowel sounds normal. No masses,  No organomegaly. Extremities: no cyanosis or edema. Right LE in splint and metal frame/external fixation; moves toes to command. Feet warm. Dressing dry. Skin: Skin color, texture, turgor normal. No rashes or lesions   Neurologic: CNII-XII intact. Alert and oriented X 3. Data Review:   EXAM:  XR TIB/FIB RT, XR ANKLE RT MIN 3 V 3/2/18  INDICATION:  fx.  Right ankle deformity after a fall  COMPARISON: None. FINDINGS: AP and lateral  views of the right tibia and fibula. 3 views of the  right ankle. The patient is status post ORIF of the patella. There is mild medial compartment  osteophytosis in the knee. There is chronic deformity of the fibular neck. There  is a displaced fracture of the medial malleolus. The medial malleolus is 1.5 cm  from the distal tibia. There is a comminuted displaced fracture of the lateral  malleolus at the level of the tibiotalar joint. The talus and distal lateral  malleolus are dislocated laterally by 2.8 cm and slightly retracted.   IMPRESSION: Bimalleolar ankle fracture dislocation    Recent Days:  Recent Labs      18   0447   WBC  6.7   HGB  10.6*   HCT  32.4*   PLT  220     Recent Labs      18   0447   NA  139   K  4.2   CL  101   CO2  28   GLU  98   BUN  15   CREA  0.78   CA  9.1   ALB  3.0* TBILI  0.4   SGOT  19   ALT  11*     No results for input(s): PH, PCO2, PO2, HCO3, FIO2 in the last 72 hours. 24 Hour Results:  No results found for this or any previous visit (from the past 24 hour(s)).   Medications reviewed  Current Facility-Administered Medications   Medication Dose Route Frequency    letrozole (FEMARA) tablet 2.5 mg  2.5 mg Oral DAILY    cephALEXin (KEFLEX) capsule 500 mg  500 mg Oral Q8H    gabapentin (NEURONTIN) capsule 600 mg  600 mg Oral TID    diphenhydrAMINE (BENADRYL) capsule 25 mg  25 mg Oral Q6H PRN    diphenhydrAMINE (BENADRYL) injection 25 mg  25 mg IntraVENous Q6H PRN    traZODone (DESYREL) tablet 50 mg  50 mg Oral QHS PRN    traZODone (DESYREL) tablet 50 mg  50 mg Oral QHS PRN    hydroCHLOROthiazide (HYDRODIURIL) tablet 25 mg  25 mg Oral DAILY    losartan (COZAAR) tablet 50 mg  50 mg Oral DAILY    amLODIPine (NORVASC) tablet 10 mg  10 mg Oral DAILY    hydrALAZINE (APRESOLINE) 20 mg/mL injection 20 mg  20 mg IntraVENous Q4H PRN    prochlorperazine (COMPAZINE) tablet 10 mg  10 mg Oral Q6H PRN    therapeutic multivitamin (THERAGRAN) tablet 1 Tab  1 Tab Oral DAILY    carvedilol (COREG) tablet 12.5 mg  12.5 mg Oral BID WITH MEALS    DULoxetine (CYMBALTA) capsule 20 mg  20 mg Oral DAILY    levothyroxine (SYNTHROID) tablet 50 mcg  50 mcg Oral ACB    simvastatin (ZOCOR) tablet 20 mg  20 mg Oral DAILY    sodium chloride (NS) flush 5-10 mL  5-10 mL IntraVENous Q8H    sodium chloride (NS) flush 5-10 mL  5-10 mL IntraVENous PRN    heparin (porcine) injection 5,000 Units  5,000 Units SubCUTAneous Q8H    acetaminophen (TYLENOL) tablet 650 mg  650 mg Oral Q4H PRN    oxyCODONE-acetaminophen (PERCOCET 7.5) 7.5-325 mg per tablet 1 Tab  1 Tab Oral Q4H PRN    HYDROmorphone (PF) (DILAUDID) injection 1 mg  1 mg IntraVENous Q4H PRN    naloxone (NARCAN) injection 0.4 mg  0.4 mg IntraVENous PRN    folic acid (FOLVITE) tablet 1 mg  1 mg Oral DAILY    Thiamine Mononitrate (B-1) tablet 100 mg  100 mg Oral DAILY    LORazepam (ATIVAN) injection 1 mg  1 mg IntraVENous Q6H PRN    prochlorperazine (COMPAZINE) injection 5 mg  5 mg IntraVENous Q6H PRN       Christelle Guzmán MD

## 2018-03-12 ENCOUNTER — APPOINTMENT (OUTPATIENT)
Dept: GENERAL RADIOLOGY | Age: 63
DRG: 218 | End: 2018-03-12
Attending: ORTHOPAEDIC SURGERY
Payer: COMMERCIAL

## 2018-03-12 ENCOUNTER — ANESTHESIA (OUTPATIENT)
Dept: SURGERY | Age: 63
DRG: 218 | End: 2018-03-12
Payer: COMMERCIAL

## 2018-03-12 ENCOUNTER — ANESTHESIA EVENT (OUTPATIENT)
Dept: SURGERY | Age: 63
DRG: 218 | End: 2018-03-12
Payer: COMMERCIAL

## 2018-03-12 LAB
ALBUMIN SERPL-MCNC: 3.1 G/DL (ref 3.5–5)
ALBUMIN/GLOB SERPL: 0.8 {RATIO} (ref 1.1–2.2)
ALP SERPL-CCNC: 72 U/L (ref 45–117)
ALT SERPL-CCNC: 15 U/L (ref 12–78)
ANION GAP SERPL CALC-SCNC: 7 MMOL/L (ref 5–15)
AST SERPL-CCNC: 15 U/L (ref 15–37)
BASOPHILS # BLD: 0.1 K/UL (ref 0–0.1)
BASOPHILS NFR BLD: 1 % (ref 0–1)
BILIRUB SERPL-MCNC: 0.3 MG/DL (ref 0.2–1)
BUN SERPL-MCNC: 18 MG/DL (ref 6–20)
BUN/CREAT SERPL: 20 (ref 12–20)
CALCIUM SERPL-MCNC: 9.4 MG/DL (ref 8.5–10.1)
CHLORIDE SERPL-SCNC: 100 MMOL/L (ref 97–108)
CO2 SERPL-SCNC: 29 MMOL/L (ref 21–32)
CREAT SERPL-MCNC: 0.92 MG/DL (ref 0.55–1.02)
DIFFERENTIAL METHOD BLD: ABNORMAL
EOSINOPHIL # BLD: 0.1 K/UL (ref 0–0.4)
EOSINOPHIL NFR BLD: 1 % (ref 0–7)
ERYTHROCYTE [DISTWIDTH] IN BLOOD BY AUTOMATED COUNT: 14.3 % (ref 11.5–14.5)
GLOBULIN SER CALC-MCNC: 3.7 G/DL (ref 2–4)
GLUCOSE SERPL-MCNC: 104 MG/DL (ref 65–100)
HCT VFR BLD AUTO: 31.9 % (ref 35–47)
HGB BLD-MCNC: 10.6 G/DL (ref 11.5–16)
IMM GRANULOCYTES # BLD: 0 K/UL (ref 0–0.04)
IMM GRANULOCYTES NFR BLD AUTO: 1 % (ref 0–0.5)
LYMPHOCYTES # BLD: 2.6 K/UL (ref 0.8–3.5)
LYMPHOCYTES NFR BLD: 33 % (ref 12–49)
MCH RBC QN AUTO: 31.6 PG (ref 26–34)
MCHC RBC AUTO-ENTMCNC: 33.2 G/DL (ref 30–36.5)
MCV RBC AUTO: 95.2 FL (ref 80–99)
MONOCYTES # BLD: 0.7 K/UL (ref 0–1)
MONOCYTES NFR BLD: 9 % (ref 5–13)
NEUTS SEG # BLD: 4.3 K/UL (ref 1.8–8)
NEUTS SEG NFR BLD: 55 % (ref 32–75)
NRBC # BLD: 0 K/UL (ref 0–0.01)
NRBC BLD-RTO: 0 PER 100 WBC
PLATELET # BLD AUTO: 300 K/UL (ref 150–400)
PMV BLD AUTO: 9.5 FL (ref 8.9–12.9)
POTASSIUM SERPL-SCNC: 3.6 MMOL/L (ref 3.5–5.1)
PROT SERPL-MCNC: 6.8 G/DL (ref 6.4–8.2)
RBC # BLD AUTO: 3.35 M/UL (ref 3.8–5.2)
SODIUM SERPL-SCNC: 136 MMOL/L (ref 136–145)
WBC # BLD AUTO: 7.8 K/UL (ref 3.6–11)

## 2018-03-12 PROCEDURE — 74011250637 HC RX REV CODE- 250/637: Performed by: PHYSICIAN ASSISTANT

## 2018-03-12 PROCEDURE — 77030000032 HC CUF TRNQT ZIMM -B: Performed by: ORTHOPAEDIC SURGERY

## 2018-03-12 PROCEDURE — 77030020782 HC GWN BAIR PAWS FLX 3M -B

## 2018-03-12 PROCEDURE — 74011250636 HC RX REV CODE- 250/636: Performed by: ANESTHESIOLOGY

## 2018-03-12 PROCEDURE — C1713 ANCHOR/SCREW BN/BN,TIS/BN: HCPCS | Performed by: ORTHOPAEDIC SURGERY

## 2018-03-12 PROCEDURE — 65270000029 HC RM PRIVATE

## 2018-03-12 PROCEDURE — 0QSG04Z REPOSITION RIGHT TIBIA WITH INTERNAL FIXATION DEVICE, OPEN APPROACH: ICD-10-PCS | Performed by: ORTHOPAEDIC SURGERY

## 2018-03-12 PROCEDURE — 74011250637 HC RX REV CODE- 250/637: Performed by: ORTHOPAEDIC SURGERY

## 2018-03-12 PROCEDURE — 80053 COMPREHEN METABOLIC PANEL: CPT | Performed by: FAMILY MEDICINE

## 2018-03-12 PROCEDURE — 77030008467 HC STPLR SKN COVD -B: Performed by: ORTHOPAEDIC SURGERY

## 2018-03-12 PROCEDURE — 74011250636 HC RX REV CODE- 250/636

## 2018-03-12 PROCEDURE — 76060000036 HC ANESTHESIA 2.5 TO 3 HR: Performed by: ORTHOPAEDIC SURGERY

## 2018-03-12 PROCEDURE — 77030031139 HC SUT VCRL2 J&J -A: Performed by: ORTHOPAEDIC SURGERY

## 2018-03-12 PROCEDURE — 77030020143 HC AIRWY LARYN INTUB CGAS -A: Performed by: ANESTHESIOLOGY

## 2018-03-12 PROCEDURE — 74011250637 HC RX REV CODE- 250/637: Performed by: FAMILY MEDICINE

## 2018-03-12 PROCEDURE — 77030018836 HC SOL IRR NACL ICUM -A: Performed by: ORTHOPAEDIC SURGERY

## 2018-03-12 PROCEDURE — 74011000250 HC RX REV CODE- 250

## 2018-03-12 PROCEDURE — 77030038269 HC DRN EXT URIN PURWCK BARD -A

## 2018-03-12 PROCEDURE — 0QPG35Z REMOVAL OF EXTERNAL FIXATION DEVICE FROM RIGHT TIBIA, PERCUTANEOUS APPROACH: ICD-10-PCS | Performed by: REGISTERED NURSE

## 2018-03-12 PROCEDURE — 77030003044 HC WRE K WRGH -B: Performed by: ORTHOPAEDIC SURGERY

## 2018-03-12 PROCEDURE — 0QSJ04Z REPOSITION RIGHT FIBULA WITH INTERNAL FIXATION DEVICE, OPEN APPROACH: ICD-10-PCS | Performed by: ORTHOPAEDIC SURGERY

## 2018-03-12 PROCEDURE — 77030002933 HC SUT MCRYL J&J -A: Performed by: ORTHOPAEDIC SURGERY

## 2018-03-12 PROCEDURE — 0QPJ35Z REMOVAL OF EXTERNAL FIXATION DEVICE FROM RIGHT FIBULA, PERCUTANEOUS APPROACH: ICD-10-PCS | Performed by: ORTHOPAEDIC SURGERY

## 2018-03-12 PROCEDURE — 76001 XR FLUOROSCOPY OVER 60 MINUTES: CPT

## 2018-03-12 PROCEDURE — 77030021807 HC PIN TEMP FIX WRGH -B: Performed by: ORTHOPAEDIC SURGERY

## 2018-03-12 PROCEDURE — 77030029732 HC BIT DRL ORTHOLOC 3DI WRGH -B: Performed by: ORTHOPAEDIC SURGERY

## 2018-03-12 PROCEDURE — 76210000016 HC OR PH I REC 1 TO 1.5 HR: Performed by: ORTHOPAEDIC SURGERY

## 2018-03-12 PROCEDURE — 77030020274 HC MISC IMPL ORTHOPEDIC: Performed by: ORTHOPAEDIC SURGERY

## 2018-03-12 PROCEDURE — 64445 NJX AA&/STRD SCIATIC NRV IMG: CPT

## 2018-03-12 PROCEDURE — 76010000172 HC OR TIME 2.5 TO 3 HR INTENSV-TIER 1: Performed by: ORTHOPAEDIC SURGERY

## 2018-03-12 PROCEDURE — 74011250636 HC RX REV CODE- 250/636: Performed by: ORTHOPAEDIC SURGERY

## 2018-03-12 PROCEDURE — 77030002946 HC SUT NRLN J&J -B: Performed by: ORTHOPAEDIC SURGERY

## 2018-03-12 PROCEDURE — 74011250636 HC RX REV CODE- 250/636: Performed by: FAMILY MEDICINE

## 2018-03-12 PROCEDURE — 77030011640 HC PAD GRND REM COVD -A: Performed by: ORTHOPAEDIC SURGERY

## 2018-03-12 PROCEDURE — 77030013079 HC BLNKT BAIR HGGR 3M -A: Performed by: ANESTHESIOLOGY

## 2018-03-12 PROCEDURE — 77030013570 HC DRSG BURN DERY -A: Performed by: ORTHOPAEDIC SURGERY

## 2018-03-12 PROCEDURE — 0MQQ0ZZ REPAIR RIGHT ANKLE BURSA AND LIGAMENT, OPEN APPROACH: ICD-10-PCS | Performed by: ORTHOPAEDIC SURGERY

## 2018-03-12 PROCEDURE — 85025 COMPLETE CBC W/AUTO DIFF WBC: CPT | Performed by: FAMILY MEDICINE

## 2018-03-12 PROCEDURE — 77030029902 HC PIN TEMP FIX LG WRGH -B: Performed by: ORTHOPAEDIC SURGERY

## 2018-03-12 PROCEDURE — 77030014685 HC STIM BN GRWTH PHYSIO EXTERNAL ORTH -I1: Performed by: ORTHOPAEDIC SURGERY

## 2018-03-12 PROCEDURE — 36415 COLL VENOUS BLD VENIPUNCTURE: CPT | Performed by: FAMILY MEDICINE

## 2018-03-12 PROCEDURE — 77030003601 HC NDL NRV BLK BBMI -A

## 2018-03-12 PROCEDURE — 97530 THERAPEUTIC ACTIVITIES: CPT

## 2018-03-12 PROCEDURE — 77030034094 HC GRFT BN SUB ELITE TRNTY MUSC -I1: Performed by: ORTHOPAEDIC SURGERY

## 2018-03-12 DEVICE — GRAFT BNE SUB M CANC FRZN MORSELIZED W/ VIABLE CELL TRINITY: Type: IMPLANTABLE DEVICE | Site: ANKLE | Status: FUNCTIONAL

## 2018-03-12 DEVICE — IMPLANTABLE DEVICE
Type: IMPLANTABLE DEVICE | Site: ANKLE | Status: FUNCTIONAL
Brand: ORTHOLOC

## 2018-03-12 DEVICE — GRAFT BNE SUB L CANC FRZN MORSELIZED W/ VIABLE CELL TRINITY: Type: IMPLANTABLE DEVICE | Site: ANKLE | Status: FUNCTIONAL

## 2018-03-12 RX ORDER — AMOXICILLIN 250 MG
1 CAPSULE ORAL 2 TIMES DAILY
Status: DISCONTINUED | OUTPATIENT
Start: 2018-03-12 | End: 2018-03-14 | Stop reason: HOSPADM

## 2018-03-12 RX ORDER — SODIUM CHLORIDE 0.9 % (FLUSH) 0.9 %
5-10 SYRINGE (ML) INJECTION EVERY 8 HOURS
Status: DISCONTINUED | OUTPATIENT
Start: 2018-03-13 | End: 2018-03-14 | Stop reason: HOSPADM

## 2018-03-12 RX ORDER — LIDOCAINE HYDROCHLORIDE 10 MG/ML
0.1 INJECTION, SOLUTION EPIDURAL; INFILTRATION; INTRACAUDAL; PERINEURAL AS NEEDED
Status: DISCONTINUED | OUTPATIENT
Start: 2018-03-12 | End: 2018-03-12 | Stop reason: HOSPADM

## 2018-03-12 RX ORDER — SODIUM CHLORIDE 0.9 % (FLUSH) 0.9 %
5-10 SYRINGE (ML) INJECTION AS NEEDED
Status: DISCONTINUED | OUTPATIENT
Start: 2018-03-12 | End: 2018-03-14 | Stop reason: HOSPADM

## 2018-03-12 RX ORDER — ENOXAPARIN SODIUM 100 MG/ML
40 INJECTION SUBCUTANEOUS DAILY
Status: DISCONTINUED | OUTPATIENT
Start: 2018-03-13 | End: 2018-03-14 | Stop reason: HOSPADM

## 2018-03-12 RX ORDER — HYDROMORPHONE HYDROCHLORIDE 1 MG/ML
.25-1 INJECTION, SOLUTION INTRAMUSCULAR; INTRAVENOUS; SUBCUTANEOUS
Status: DISCONTINUED | OUTPATIENT
Start: 2018-03-12 | End: 2018-03-12

## 2018-03-12 RX ORDER — POLYETHYLENE GLYCOL 3350 17 G/17G
17 POWDER, FOR SOLUTION ORAL DAILY
Status: DISCONTINUED | OUTPATIENT
Start: 2018-03-13 | End: 2018-03-14 | Stop reason: HOSPADM

## 2018-03-12 RX ORDER — CEFAZOLIN SODIUM 1 G/3ML
INJECTION, POWDER, FOR SOLUTION INTRAMUSCULAR; INTRAVENOUS AS NEEDED
Status: DISCONTINUED | OUTPATIENT
Start: 2018-03-12 | End: 2018-03-12 | Stop reason: HOSPADM

## 2018-03-12 RX ORDER — LIDOCAINE HYDROCHLORIDE 20 MG/ML
INJECTION, SOLUTION EPIDURAL; INFILTRATION; INTRACAUDAL; PERINEURAL AS NEEDED
Status: DISCONTINUED | OUTPATIENT
Start: 2018-03-12 | End: 2018-03-12 | Stop reason: HOSPADM

## 2018-03-12 RX ORDER — SODIUM CHLORIDE 9 MG/ML
75 INJECTION, SOLUTION INTRAVENOUS CONTINUOUS
Status: DISPENSED | OUTPATIENT
Start: 2018-03-12 | End: 2018-03-13

## 2018-03-12 RX ORDER — SODIUM CHLORIDE 0.9 % (FLUSH) 0.9 %
5-10 SYRINGE (ML) INJECTION AS NEEDED
Status: DISCONTINUED | OUTPATIENT
Start: 2018-03-12 | End: 2018-03-12 | Stop reason: HOSPADM

## 2018-03-12 RX ORDER — ONDANSETRON 2 MG/ML
4 INJECTION INTRAMUSCULAR; INTRAVENOUS AS NEEDED
Status: DISCONTINUED | OUTPATIENT
Start: 2018-03-12 | End: 2018-03-12 | Stop reason: HOSPADM

## 2018-03-12 RX ORDER — ONDANSETRON 2 MG/ML
INJECTION INTRAMUSCULAR; INTRAVENOUS AS NEEDED
Status: DISCONTINUED | OUTPATIENT
Start: 2018-03-12 | End: 2018-03-12 | Stop reason: HOSPADM

## 2018-03-12 RX ORDER — SODIUM CHLORIDE 0.9 % (FLUSH) 0.9 %
5-10 SYRINGE (ML) INJECTION EVERY 8 HOURS
Status: DISCONTINUED | OUTPATIENT
Start: 2018-03-12 | End: 2018-03-12 | Stop reason: HOSPADM

## 2018-03-12 RX ORDER — NALOXONE HYDROCHLORIDE 0.4 MG/ML
0.4 INJECTION, SOLUTION INTRAMUSCULAR; INTRAVENOUS; SUBCUTANEOUS AS NEEDED
Status: DISCONTINUED | OUTPATIENT
Start: 2018-03-12 | End: 2018-03-14 | Stop reason: HOSPADM

## 2018-03-12 RX ORDER — MIDAZOLAM HYDROCHLORIDE 1 MG/ML
INJECTION, SOLUTION INTRAMUSCULAR; INTRAVENOUS AS NEEDED
Status: DISCONTINUED | OUTPATIENT
Start: 2018-03-12 | End: 2018-03-12 | Stop reason: HOSPADM

## 2018-03-12 RX ORDER — EPHEDRINE SULFATE 50 MG/ML
INJECTION, SOLUTION INTRAVENOUS AS NEEDED
Status: DISCONTINUED | OUTPATIENT
Start: 2018-03-12 | End: 2018-03-12 | Stop reason: HOSPADM

## 2018-03-12 RX ORDER — SODIUM CHLORIDE, SODIUM LACTATE, POTASSIUM CHLORIDE, CALCIUM CHLORIDE 600; 310; 30; 20 MG/100ML; MG/100ML; MG/100ML; MG/100ML
125 INJECTION, SOLUTION INTRAVENOUS CONTINUOUS
Status: DISCONTINUED | OUTPATIENT
Start: 2018-03-12 | End: 2018-03-12 | Stop reason: HOSPADM

## 2018-03-12 RX ORDER — DEXAMETHASONE SODIUM PHOSPHATE 4 MG/ML
INJECTION, SOLUTION INTRA-ARTICULAR; INTRALESIONAL; INTRAMUSCULAR; INTRAVENOUS; SOFT TISSUE AS NEEDED
Status: DISCONTINUED | OUTPATIENT
Start: 2018-03-12 | End: 2018-03-12 | Stop reason: HOSPADM

## 2018-03-12 RX ORDER — DIPHENHYDRAMINE HYDROCHLORIDE 50 MG/ML
12.5 INJECTION, SOLUTION INTRAMUSCULAR; INTRAVENOUS AS NEEDED
Status: DISCONTINUED | OUTPATIENT
Start: 2018-03-12 | End: 2018-03-12 | Stop reason: HOSPADM

## 2018-03-12 RX ORDER — FACIAL-BODY WIPES
10 EACH TOPICAL DAILY PRN
Status: DISCONTINUED | OUTPATIENT
Start: 2018-03-14 | End: 2018-03-14 | Stop reason: HOSPADM

## 2018-03-12 RX ORDER — HYDROCODONE BITARTRATE AND ACETAMINOPHEN 5; 325 MG/1; MG/1
1 TABLET ORAL
Status: DISCONTINUED | OUTPATIENT
Start: 2018-03-12 | End: 2018-03-13

## 2018-03-12 RX ORDER — CEFAZOLIN SODIUM/WATER 2 G/20 ML
2 SYRINGE (ML) INTRAVENOUS EVERY 8 HOURS
Status: COMPLETED | OUTPATIENT
Start: 2018-03-13 | End: 2018-03-13

## 2018-03-12 RX ORDER — MORPHINE SULFATE 2 MG/ML
1 INJECTION, SOLUTION INTRAMUSCULAR; INTRAVENOUS
Status: DISPENSED | OUTPATIENT
Start: 2018-03-12 | End: 2018-03-13

## 2018-03-12 RX ORDER — FENTANYL CITRATE 50 UG/ML
INJECTION, SOLUTION INTRAMUSCULAR; INTRAVENOUS AS NEEDED
Status: DISCONTINUED | OUTPATIENT
Start: 2018-03-12 | End: 2018-03-12 | Stop reason: HOSPADM

## 2018-03-12 RX ORDER — PROPOFOL 10 MG/ML
INJECTION, EMULSION INTRAVENOUS AS NEEDED
Status: DISCONTINUED | OUTPATIENT
Start: 2018-03-12 | End: 2018-03-12 | Stop reason: HOSPADM

## 2018-03-12 RX ORDER — FERROUS SULFATE, DRIED 160(50) MG
1 TABLET, EXTENDED RELEASE ORAL
Status: DISCONTINUED | OUTPATIENT
Start: 2018-03-13 | End: 2018-03-14 | Stop reason: HOSPADM

## 2018-03-12 RX ORDER — SODIUM CHLORIDE, SODIUM LACTATE, POTASSIUM CHLORIDE, CALCIUM CHLORIDE 600; 310; 30; 20 MG/100ML; MG/100ML; MG/100ML; MG/100ML
100 INJECTION, SOLUTION INTRAVENOUS CONTINUOUS
Status: DISCONTINUED | OUTPATIENT
Start: 2018-03-12 | End: 2018-03-12 | Stop reason: HOSPADM

## 2018-03-12 RX ORDER — ROPIVACAINE HYDROCHLORIDE 5 MG/ML
INJECTION, SOLUTION EPIDURAL; INFILTRATION; PERINEURAL AS NEEDED
Status: DISCONTINUED | OUTPATIENT
Start: 2018-03-12 | End: 2018-03-12 | Stop reason: HOSPADM

## 2018-03-12 RX ADMIN — Medication 10 ML: at 23:14

## 2018-03-12 RX ADMIN — TRAZODONE HYDROCHLORIDE 50 MG: 50 TABLET ORAL at 01:40

## 2018-03-12 RX ADMIN — ROPIVACAINE HYDROCHLORIDE 10 ML: 5 INJECTION, SOLUTION EPIDURAL; INFILTRATION; PERINEURAL at 16:32

## 2018-03-12 RX ADMIN — FENTANYL CITRATE 50 MCG: 50 INJECTION, SOLUTION INTRAMUSCULAR; INTRAVENOUS at 16:20

## 2018-03-12 RX ADMIN — EPHEDRINE SULFATE 10 MG: 50 INJECTION, SOLUTION INTRAVENOUS at 18:32

## 2018-03-12 RX ADMIN — CARVEDILOL 12.5 MG: 12.5 TABLET, FILM COATED ORAL at 08:31

## 2018-03-12 RX ADMIN — SODIUM CHLORIDE 125 ML/HR: 900 INJECTION, SOLUTION INTRAVENOUS at 21:12

## 2018-03-12 RX ADMIN — TRAZODONE HYDROCHLORIDE 50 MG: 50 TABLET ORAL at 02:52

## 2018-03-12 RX ADMIN — FENTANYL CITRATE 50 MCG: 50 INJECTION, SOLUTION INTRAMUSCULAR; INTRAVENOUS at 19:06

## 2018-03-12 RX ADMIN — MIDAZOLAM HYDROCHLORIDE 2 MG: 1 INJECTION, SOLUTION INTRAMUSCULAR; INTRAVENOUS at 17:57

## 2018-03-12 RX ADMIN — DEXAMETHASONE SODIUM PHOSPHATE 4 MG: 4 INJECTION, SOLUTION INTRA-ARTICULAR; INTRALESIONAL; INTRAMUSCULAR; INTRAVENOUS; SOFT TISSUE at 18:10

## 2018-03-12 RX ADMIN — Medication 100 MG: at 08:31

## 2018-03-12 RX ADMIN — GABAPENTIN 600 MG: 300 CAPSULE ORAL at 23:12

## 2018-03-12 RX ADMIN — HYDROCHLOROTHIAZIDE 25 MG: 25 TABLET ORAL at 08:32

## 2018-03-12 RX ADMIN — CEPHALEXIN 500 MG: 250 CAPSULE ORAL at 15:02

## 2018-03-12 RX ADMIN — GABAPENTIN 600 MG: 300 CAPSULE ORAL at 08:32

## 2018-03-12 RX ADMIN — FENTANYL CITRATE 50 MCG: 50 INJECTION, SOLUTION INTRAMUSCULAR; INTRAVENOUS at 20:20

## 2018-03-12 RX ADMIN — ROPIVACAINE HYDROCHLORIDE 30 ML: 5 INJECTION, SOLUTION EPIDURAL; INFILTRATION; PERINEURAL at 16:26

## 2018-03-12 RX ADMIN — DULOXETINE HYDROCHLORIDE 20 MG: 20 CAPSULE, DELAYED RELEASE ORAL at 08:31

## 2018-03-12 RX ADMIN — Medication 10 ML: at 06:33

## 2018-03-12 RX ADMIN — HYDROMORPHONE HYDROCHLORIDE 1 MG: 1 INJECTION, SOLUTION INTRAMUSCULAR; INTRAVENOUS; SUBCUTANEOUS at 20:45

## 2018-03-12 RX ADMIN — HYDROMORPHONE HYDROCHLORIDE 0.5 MG: 1 INJECTION, SOLUTION INTRAMUSCULAR; INTRAVENOUS; SUBCUTANEOUS at 20:56

## 2018-03-12 RX ADMIN — LIDOCAINE HYDROCHLORIDE 40 MG: 20 INJECTION, SOLUTION EPIDURAL; INFILTRATION; INTRACAUDAL; PERINEURAL at 18:05

## 2018-03-12 RX ADMIN — LETROZOLE 2.5 MG: 2.5 TABLET, FILM COATED ORAL at 08:31

## 2018-03-12 RX ADMIN — FENTANYL CITRATE 25 MCG: 50 INJECTION, SOLUTION INTRAMUSCULAR; INTRAVENOUS at 19:49

## 2018-03-12 RX ADMIN — FOLIC ACID 1 MG: 1 TABLET ORAL at 08:31

## 2018-03-12 RX ADMIN — SODIUM CHLORIDE, POTASSIUM CHLORIDE, SODIUM LACTATE AND CALCIUM CHLORIDE: 600; 310; 30; 20 INJECTION, SOLUTION INTRAVENOUS at 19:46

## 2018-03-12 RX ADMIN — FENTANYL CITRATE 50 MCG: 50 INJECTION, SOLUTION INTRAMUSCULAR; INTRAVENOUS at 17:57

## 2018-03-12 RX ADMIN — DOCUSATE SODIUM AND SENNOSIDES 1 TABLET: 8.6; 5 TABLET, FILM COATED ORAL at 23:13

## 2018-03-12 RX ADMIN — SODIUM CHLORIDE, POTASSIUM CHLORIDE, SODIUM LACTATE AND CALCIUM CHLORIDE: 600; 310; 30; 20 INJECTION, SOLUTION INTRAVENOUS at 15:30

## 2018-03-12 RX ADMIN — PROPOFOL 150 MG: 10 INJECTION, EMULSION INTRAVENOUS at 18:05

## 2018-03-12 RX ADMIN — EPHEDRINE SULFATE 10 MG: 50 INJECTION, SOLUTION INTRAVENOUS at 18:22

## 2018-03-12 RX ADMIN — Medication 10 ML: at 12:30

## 2018-03-12 RX ADMIN — CEFAZOLIN SODIUM 2 G: 1 INJECTION, POWDER, FOR SOLUTION INTRAMUSCULAR; INTRAVENOUS at 18:26

## 2018-03-12 RX ADMIN — MIDAZOLAM HYDROCHLORIDE 3 MG: 1 INJECTION, SOLUTION INTRAMUSCULAR; INTRAVENOUS at 16:20

## 2018-03-12 RX ADMIN — OXYCODONE HYDROCHLORIDE AND ACETAMINOPHEN 1 TABLET: 7.5; 325 TABLET ORAL at 12:29

## 2018-03-12 RX ADMIN — CEPHALEXIN 500 MG: 250 CAPSULE ORAL at 23:13

## 2018-03-12 RX ADMIN — PROPOFOL 30 MG: 10 INJECTION, EMULSION INTRAVENOUS at 18:08

## 2018-03-12 RX ADMIN — THERA TABS 1 TABLET: TAB at 08:32

## 2018-03-12 RX ADMIN — AMLODIPINE BESYLATE 10 MG: 5 TABLET ORAL at 08:31

## 2018-03-12 RX ADMIN — ONDANSETRON 4 MG: 2 INJECTION INTRAMUSCULAR; INTRAVENOUS at 18:09

## 2018-03-12 RX ADMIN — LEVOTHYROXINE SODIUM 50 MCG: 50 TABLET ORAL at 06:32

## 2018-03-12 RX ADMIN — FENTANYL CITRATE 50 MCG: 50 INJECTION, SOLUTION INTRAMUSCULAR; INTRAVENOUS at 19:57

## 2018-03-12 RX ADMIN — CEPHALEXIN 500 MG: 250 CAPSULE ORAL at 06:32

## 2018-03-12 RX ADMIN — FENTANYL CITRATE 25 MCG: 50 INJECTION, SOLUTION INTRAMUSCULAR; INTRAVENOUS at 18:46

## 2018-03-12 RX ADMIN — FENTANYL CITRATE 50 MCG: 50 INJECTION, SOLUTION INTRAMUSCULAR; INTRAVENOUS at 20:08

## 2018-03-12 RX ADMIN — EPHEDRINE SULFATE 15 MG: 50 INJECTION, SOLUTION INTRAVENOUS at 18:26

## 2018-03-12 RX ADMIN — GABAPENTIN 600 MG: 300 CAPSULE ORAL at 15:02

## 2018-03-12 NOTE — PROGRESS NOTES
Family Practice Daily Progress Note: 3/12/2018  Katie Christian MD    Assessment/Plan:   MARYJO on admit - resolved     Hypokalemia - resolved     HTN (hypertension), benign (3/3/2018): BP improved on current regimen     Depression with anxiety- Cymbalta     Dyslipidemia -  simvastatin      Ankle fracture, right Per ortho. --Surgery planned for tomorrow 3/12    Alcohol withdrawal as evidenced by confusion & agitation treated with IV Ativan 2mg and placed on CIWA protocol - resolved    Hx breast CA - continue femara    DVT proph - heparin subcu        Problem List:  Problem List as of 3/12/2018  Date Reviewed: 8/24/2016          Codes Class Noted - Resolved    MARYJO (acute kidney injury) ([facility] [address] ICD-10-CM: N17.9  ICD-9-CM: 584.9  3/3/2018 - Present        Hypokalemia due to loss of potassium ICD-10-CM: E87.6  ICD-9-CM: 276.8  3/3/2018 - Present        HTN (hypertension), benign ICD-10-CM: I10  ICD-9-CM: 401.1  3/3/2018 - Present        Depression with anxiety ICD-10-CM: F41.8  ICD-9-CM: 300.4  3/3/2018 - Present        Dyslipidemia ICD-10-CM: E78.5  ICD-9-CM: 272.4  3/3/2018 - Present        * (Principal)Ankle fracture, right ICD-10-CM: I32.214M  ICD-9-CM: 824.8  3/2/2018 - Present        Numbness and tingling of both legs below knees ICD-10-CM: R20.0, R20.2  ICD-9-CM: 782.0  11/30/2015 - Present        Numbness in feet ICD-10-CM: R20.0  ICD-9-CM: 782.0  10/23/2015 - Present        Burning sensation of feet ICD-10-CM: R20.8  ICD-9-CM: 782.0  10/23/2015 - Present        SAH (subarachnoid hemorrhage) (HCC) ICD-10-CM: I60.9  ICD-9-CM: 354  10/23/2015 - Present              Subjective:    62 yo WF, pt Dr Matilde Hernandez in our group, admitted after fall and severe fx of right ankle requiring 2 stage surg repair, first repair done 3/2/18. Pt reports no syncope or dizziness prior to fall and no prodrome events.   She did walk on the foot for 2 days post fall and had little pain due to her chronic LE neuropathy. We are asked to see her for her medical problems including elevated creat. She does report marked decrease in intake for 2 days since her fall. She has a hx of HTN which has been relatively well controlled outpt. She also has a hx hypothyroidism, LE neuropathy, depression with anxiety and hypothyroidism and reports she is compliant with her medications. In addition, she reports Alcoholism and reports she has been sober for Marvetta Harvey couple of months. \"  She is going to call her sponsor today as she is feeling additional stress from the fall and surg. Currently she is stable for follow up surg. 3/4: Had more agitation and confusion last night and had to start CIWA protocol. She is better this am but still having problems with word retrieval. She reports to me that her last drink was Monday but told Dr Salomon Thomas it was months ago. Potassium still low at 2.9. Will replete with po.     3/5:  Agitation and confusion have resolved. K+ back up after repletion. Discussed with ortho. 3/6:  Feeling better this AM.  BP spiked last night and Amlodipine added and resumed her usual BP meds - BP now better. No further signs of ETOH withdrawal.      3/7: BP is much better. She slept well last night. No signs of withdrawal. Case management working on placement until her surgery. She is not safe to go home. Potassium a little low. 3/8:  No new complaints. K+ back up. BP ok. Medical problems stable. 3/9:  Little pain at this time. K+ ok. BP ok. Gregorio Bustamante discussion held about her ETOH and she is resolved to stop ETOH. 3/10: No acute complaints. Anxious about surgery. Denies CP, SOB. Pain in foot controlled. Tolerating po. +BM. 3/11: No acute events overnight. Notes that swelling in foot has diminished. 3/12:  No complaints except discomfort from right leg. Stable for surg later today.       Past Medical History:   Diagnosis Date    Alcohol abuse     Anxiety     Depression     Fatigue  Hypertension     Memory loss     Vertigo     Visual disturbance      Past Surgical History:   Procedure Laterality Date    HX ANKLE FRACTURE TX Right 12/2016    HX APPENDECTOMY      HX ORTHOPAEDIC      right knee repair    HX OTHER SURGICAL  04/2016    double mastectomy     HX OTHER SURGICAL  06/2016    reconstructive surgery on her breasts    HX SHOULDER REPLACEMENT Right 11/2016    HX WRIST FRACTURE TX Left 12/2016     Social History     Social History    Marital status:      Spouse name: N/A    Number of children: N/A    Years of education: N/A     Occupational History    Not on file. Social History Main Topics    Smoking status: Never Smoker    Smokeless tobacco: Never Used    Alcohol use 21.0 oz/week     35 Glasses of wine per week      Comment: last drink was 2/27    Drug use: Not on file    Sexual activity: Not on file     Other Topics Concern    Not on file     Social History Narrative     Family History   Problem Relation Age of Onset    Cancer Mother     Other Father      Allergies   Allergen Reactions    Sulfa (Sulfonamide Antibiotics) Swelling and Other (comments)     Flushing and hot feeling   Facial swelling     Patient Active Problem List   Diagnosis Code    Numbness in feet R20.0    Burning sensation of feet R20.8    SAH (subarachnoid hemorrhage) ([facility]) I60.9    Numbness and tingling of both legs below knees R20.0, R20.2    Ankle fracture, right S82.891A    MARYJO (acute kidney injury) ([facility] [address] N17.9    Hypokalemia due to loss of potassium E87.6    HTN (hypertension), benign I10    Depression with anxiety F41.8    Dyslipidemia E78.5     Review of Systems:   Neg 12 system ROS other than what is mentioned in subjective.     Objective:   Physical Exam:     Visit Vitals    /71 (BP 1 Location: Left arm, BP Patient Position: Head of bed elevated (Comment degrees))    Pulse 80    Temp 98.4 °F (36.9 °C)    Resp 18    Ht 5' 7\" (1.702 m)    Wt 70.3 kg (154 lb 15.7 oz)    SpO2 94%    Breastfeeding No    BMI 24.27 kg/m2    O2 Flow Rate (L/min): 1.5 l/min O2 Device: Room air    Temp (24hrs), Av.4 °F (36.9 °C), Min:97.6 °F (36.4 °C), Max:98.8 °F (37.1 °C)        03/10 1901 -  0700  In: -   Out: 3313 [Urine:3150]    General:  Alert, cooperative, no distress, appears stated age. Head:  Normocephalic, without obvious abnormality, atraumatic. Eyes:  Conjunctivae/corneas clear. PERRL, EOMs intact. Nose: Nares normal. Septum midline. Throat: Lips, mucosa, and tongue moist..   Neck: Supple, symmetrical   Lungs:   Clear to auscultation bilaterally. Heart:  Regular rate and rhythm, S1, S2 normal, no murmur, click, rub or gallop. Abdomen:   Soft, non-tender. Bowel sounds normal. No masses,  No organomegaly. Extremities: no cyanosis or edema. Right LE in splint and metal frame/external fixation; moves toes to command. Feet warm. Dressing dry. Skin: Skin color, texture, turgor normal. No rashes or lesions   Neurologic: CNII-XII intact. Alert and oriented X 3. Data Review:   EXAM:  XR TIB/FIB RT, XR ANKLE RT MIN 3 V 3/2/18  INDICATION:  fx.  Right ankle deformity after a fall  COMPARISON: None. FINDINGS: AP and lateral  views of the right tibia and fibula. 3 views of the  right ankle. The patient is status post ORIF of the patella. There is mild medial compartment  osteophytosis in the knee. There is chronic deformity of the fibular neck. There  is a displaced fracture of the medial malleolus. The medial malleolus is 1.5 cm  from the distal tibia. There is a comminuted displaced fracture of the lateral  malleolus at the level of the tibiotalar joint. The talus and distal lateral  malleolus are dislocated laterally by 2.8 cm and slightly retracted.   IMPRESSION: Bimalleolar ankle fracture dislocation    Recent Days:  Recent Labs      18   0205   WBC  7.8   HGB  10.6*   HCT  31.9*   PLT  300     Recent Labs      18   0205   NA  136   K 3.6   CL  100   CO2  29   GLU  104*   BUN  18   CREA  0.92   CA  9.4   ALB  3.1*   TBILI  0.3   SGOT  15   ALT  15     No results for input(s): PH, PCO2, PO2, HCO3, FIO2 in the last 72 hours. 24 Hour Results:  Recent Results (from the past 24 hour(s))   CBC WITH AUTOMATED DIFF    Collection Time: 03/12/18  2:05 AM   Result Value Ref Range    WBC 7.8 3.6 - 11.0 K/uL    RBC 3.35 (L) 3.80 - 5.20 M/uL    HGB 10.6 (L) 11.5 - 16.0 g/dL    HCT 31.9 (L) 35.0 - 47.0 %    MCV 95.2 80.0 - 99.0 FL    MCH 31.6 26.0 - 34.0 PG    MCHC 33.2 30.0 - 36.5 g/dL    RDW 14.3 11.5 - 14.5 %    PLATELET 188 062 - 151 K/uL    MPV 9.5 8.9 - 12.9 FL    NRBC 0.0 0  WBC    ABSOLUTE NRBC 0.00 0.00 - 0.01 K/uL    NEUTROPHILS 55 32 - 75 %    LYMPHOCYTES 33 12 - 49 %    MONOCYTES 9 5 - 13 %    EOSINOPHILS 1 0 - 7 %    BASOPHILS 1 0 - 1 %    IMMATURE GRANULOCYTES 1 (H) 0.0 - 0.5 %    ABS. NEUTROPHILS 4.3 1.8 - 8.0 K/UL    ABS. LYMPHOCYTES 2.6 0.8 - 3.5 K/UL    ABS. MONOCYTES 0.7 0.0 - 1.0 K/UL    ABS. EOSINOPHILS 0.1 0.0 - 0.4 K/UL    ABS. BASOPHILS 0.1 0.0 - 0.1 K/UL    ABS. IMM. GRANS. 0.0 0.00 - 0.04 K/UL    DF AUTOMATED     METABOLIC PANEL, COMPREHENSIVE    Collection Time: 03/12/18  2:05 AM   Result Value Ref Range    Sodium 136 136 - 145 mmol/L    Potassium 3.6 3.5 - 5.1 mmol/L    Chloride 100 97 - 108 mmol/L    CO2 29 21 - 32 mmol/L    Anion gap 7 5 - 15 mmol/L    Glucose 104 (H) 65 - 100 mg/dL    BUN 18 6 - 20 MG/DL    Creatinine 0.92 0.55 - 1.02 MG/DL    BUN/Creatinine ratio 20 12 - 20      GFR est AA >60 >60 ml/min/1.73m2    GFR est non-AA >60 >60 ml/min/1.73m2    Calcium 9.4 8.5 - 10.1 MG/DL    Bilirubin, total 0.3 0.2 - 1.0 MG/DL    ALT (SGPT) 15 12 - 78 U/L    AST (SGOT) 15 15 - 37 U/L    Alk.  phosphatase 72 45 - 117 U/L    Protein, total 6.8 6.4 - 8.2 g/dL    Albumin 3.1 (L) 3.5 - 5.0 g/dL    Globulin 3.7 2.0 - 4.0 g/dL    A-G Ratio 0.8 (L) 1.1 - 2.2       Medications reviewed  Current Facility-Administered Medications Medication Dose Route Frequency    letrozole [facility]) tablet 2.5 mg  2.5 mg Oral DAILY    cephALEXin (KEFLEX) capsule 500 mg  500 mg Oral Q8H    gabapentin (NEURONTIN) capsule 600 mg  600 mg Oral TID    diphenhydrAMINE (BENADRYL) capsule 25 mg  25 mg Oral Q6H PRN    diphenhydrAMINE (BENADRYL) injection 25 mg  25 mg IntraVENous Q6H PRN    traZODone (DESYREL) tablet 50 mg  50 mg Oral QHS PRN    traZODone (DESYREL) tablet 50 mg  50 mg Oral QHS PRN    hydroCHLOROthiazide (HYDRODIURIL) tablet 25 mg  25 mg Oral DAILY    losartan (COZAAR) tablet 50 mg  50 mg Oral DAILY    amLODIPine (NORVASC) tablet 10 mg  10 mg Oral DAILY    hydrALAZINE (APRESOLINE) 20 mg/mL injection 20 mg  20 mg IntraVENous Q4H PRN    prochlorperazine (COMPAZINE) tablet 10 mg  10 mg Oral Q6H PRN    therapeutic multivitamin (THERAGRAN) tablet 1 Tab  1 Tab Oral DAILY    carvedilol (COREG) tablet 12.5 mg  12.5 mg Oral BID WITH MEALS    DULoxetine (CYMBALTA) capsule 20 mg  20 mg Oral DAILY    levothyroxine (SYNTHROID) tablet 50 mcg  50 mcg Oral ACB    simvastatin (ZOCOR) tablet 20 mg  20 mg Oral DAILY    sodium chloride (NS) flush 5-10 mL  5-10 mL IntraVENous Q8H    sodium chloride (NS) flush 5-10 mL  5-10 mL IntraVENous PRN    acetaminophen (TYLENOL) tablet 650 mg  650 mg Oral Q4H PRN    oxyCODONE-acetaminophen (PERCOCET 7.5) 7.5-325 mg per tablet 1 Tab  1 Tab Oral Q4H PRN    HYDROmorphone (PF) (DILAUDID) injection 1 mg  1 mg IntraVENous Q4H PRN    naloxone (NARCAN) injection 0.4 mg  0.4 mg IntraVENous PRN    folic acid (FOLVITE) tablet 1 mg  1 mg Oral DAILY    Thiamine Mononitrate (B-1) tablet 100 mg  100 mg Oral DAILY    LORazepam (ATIVAN) injection 1 mg  1 mg IntraVENous Q6H PRN    prochlorperazine (COMPAZINE) injection 5 mg  5 mg IntraVENous Q6H PRN       María Manley MD

## 2018-03-12 NOTE — PROGRESS NOTES
Problem: Mobility Impaired (Adult and Pediatric)  Goal: *Acute Goals and Plan of Care (Insert Text)  Physical Therapy Goals  Revised 3/6/2018- Goals upgraded as progress noted today  1. Patient will move from supine to sit and sit to supine and scoot up and down  in bed with modified independence within 7 day(s). 2.  Patient will transfer from bed to chair and chair to bed with supervision/set-up with RW, NWB RLE within 7 day(s). 3.  Patient will perform sit <>stand with modified independence within 7 day(s). 4.  Patient will ambulate with supervision/set-up for 10' or less with RW, NWB RLE within 7 day(s). Physical Therapy Goals  Initiated 3/3/2018  1. Patient will move from supine to sit and sit to supine  in bed with supervision within 7 day(s). MET 3/6/18 see above  2. Patient will transfer from bed to chair and chair to bed with minimal assistance using the least restrictive device within 7 day(s). Almost Achieved 3/6/18 see above  3. Patient will perform sit to stand with minimal assistance within 7 day(s). Met 3/6/18- see above  4. Patient will ambulate with minimal assistance for 25 feet with the least restrictive device within 7 day(s). NOT MET- HOLD at this time and Continue per orders transfers only       physical Therapy TREATMENT  Patient: Yadiel Springer (79 y.o. female)  Date: 3/12/2018  Diagnosis: RIGHT ANKLE FRACTURE  Ankle fracture, right  ANKLE FRACTURE Ankle fracture, right  Procedure(s) (LRB):  ANKLE OPEN REDUCTION INTERNAL FIXATION (Right) 10 Days Post-Op  Precautions: Bed Alarm, Fall, NWB  Chart, physical therapy assessment, plan of care and goals were reviewed. ASSESSMENT:  Pt received in bed, declining to participate with physical therapy,initially, agreeable to transfer bed<>BSC when realized purwick not in place. SBA for bed mobility <>sitting EOB. Sit<>stand using RW, Pt able to recall safe technique without verbal cues.  CGA for SPT to bedside commode using RW for steadying with additional time maintaining NWB on RLE. Pt returned to bed, tearful in anticipation for R ORIF vs. Ankle arthrodesis declining supine thera ex for BLE at this time. Progression toward goals:  []    Improving appropriately and progressing toward goals  [x]    Improving slowly and progressing toward goals  []    Not making progress toward goals and plan of care will be adjusted     PLAN:  Patient continues to benefit from skilled intervention to address the above impairments. Continue treatment per established plan of care. Discharge Recommendations:  Home Health and To Be Determined  Further Equipment Recommendations for Discharge:  DME ordered     SUBJECTIVE:   Patient stated I really am not in the mood for this today.     OBJECTIVE DATA SUMMARY:   Critical Behavior:  Neurologic State: Alert  Orientation Level: Oriented X4  Cognition: Appropriate decision making, Appropriate for age attention/concentration, Appropriate safety awareness, Follows commands  Safety/Judgement: Awareness of environment, Insight into deficits  Functional Mobility Training:  Bed Mobility:     Supine to Sit: Stand-by assistance  Sit to Supine: Stand-by assistance  Scooting: Contact guard assistance        Transfers:  Sit to Stand: Contact guard assistance  Stand to Sit: Contact guard assistance        Bed to Chair: Contact guard assistance; Additional time                    Balance:  Sitting: Intact  Standing: Impaired; With support  Standing - Static: Constant support  Standing - Dynamic : Fair  Ambulation/Gait Training:                    Right Side Weight Bearing: Non-weight bearing  Left Side Weight Bearing: As tolerated                                Stairs:              Neuro Re-Education:    Therapeutic Exercises:     Pain:  Pain Scale 1: Numeric (0 - 10)  Pain Intensity 1: 0              Activity Tolerance:   Good  Please refer to the flowsheet for vital signs taken during this treatment.   After treatment:   []    Patient left in no apparent distress sitting up in chair  [x]    Patient left in no apparent distress in bed  [x]    Call bell left within reach  [x]    Nursing notified  []    Caregiver present  []    Bed alarm activated    COMMUNICATION/COLLABORATION:   The patients plan of care was discussed with: Registered Nurse    Gwyn Gonzalez   Time Calculation: 11 mins

## 2018-03-12 NOTE — PROGRESS NOTES
Subjective:    Diego Pyle is a 61 y.o. female s/p I&D and ex fix of RIGHT ankle trimalleolar ankle fx dislocation    Major Events:. Pain controlled, pt has decided to proceed with ORIF RIGHT ankle    Objective:    Vital signs in last 24 hours:    Temp:  [97.6 °F (36.4 °C)-99.2 °F (37.3 °C)]   Pulse (Heart Rate):  [67-96]   BP: ()/(53-90)   Resp Rate:  [13-20]   O2 Sat (%):  [92 %-98 %]   Weight:  [69.9 kg (154 lb)]     Temp (24hrs), Av.6 °F (37 °C), Min:97.6 °F (36.4 °C), Max:99.2 °F (37.3 °C)      Labs:    Lab Results   Component Value Date/Time    WBC 7.8 2018 02:05 AM    HGB 10.6 (L) 2018 02:05 AM    HCT 31.9 (L) 2018 02:05 AM    PLATELET 023  02:05 AM       Lab Results   Component Value Date/Time    Sodium 136 2018 02:05 AM    Potassium 3.6 2018 02:05 AM    Chloride 100 2018 02:05 AM    CO2 29 2018 02:05 AM    BUN 18 2018 02:05 AM       Physical Exam:    General: alert, cooperative, in NAD  Nonlabored resp    RIGH Lower extremity    Dressing: wound healing well, exfix in place, swelling improved, + skin wrinkles no erythema/drainage      Motor: + toe df/pf    Sensory: sensation diminished at baseline    Vascular: Brisk capillary refill in toes    Assessment/Plan:    · Pain management: as written    · DVT Prophylaxis: hold today for OR    · PT/OT: NWB     Plan removal of exfix and  ORIF RIGHT ankle today    [facility] [address]  Pop Salazar MD

## 2018-03-12 NOTE — ANESTHESIA PROCEDURE NOTES
Peripheral Block    Start time: 3/12/2018 4:20 PM  End time: 3/12/2018 4:32 PM  Performed by: Chun Purvis  Authorized by: Long Yang       Pre-procedure: Indications: at surgeon's request and post-op pain management    Preanesthetic Checklist: patient identified, risks and benefits discussed, site marked, timeout performed, anesthesia consent given and patient being monitored    Timeout Time: 16:20          Block Type:   Block Type:  Popliteal and saphenous  Laterality:  Right  Monitoring:  Continuous pulse ox, frequent vital sign checks, heart rate and responsive to questions  Injection Technique:  Single shot  Procedures: ultrasound guided and nerve stimulator    Patient Position: supine  Prep: chlorhexidine    Location:  Lower thigh  Needle Type:  Stimuplex  Needle Gauge:  21 G  Needle Localization:  Nerve stimulator and ultrasound guidance  Medication Injected:  0.5%  ropivacaine  Volume (mL):  30    Assessment:  Number of attempts:  1  Injection Assessment:  Incremental injection every 5 mL, local visualized surrounding nerve on ultrasound, negative aspiration for blood, no paresthesia and no intravascular symptoms  Patient tolerance:  Patient tolerated the procedure well with no immediate complications  Saphenous block done under ultrasound guidance with incremental injection of Ropivacaine 0.5% 10 cc using a 21 G Stimuplex needle.

## 2018-03-13 ENCOUNTER — HOME HEALTH ADMISSION (OUTPATIENT)
Dept: HOME HEALTH SERVICES | Facility: HOME HEALTH | Age: 63
End: 2018-03-13
Payer: COMMERCIAL

## 2018-03-13 LAB
ALBUMIN SERPL-MCNC: 2.8 G/DL (ref 3.5–5)
ALBUMIN/GLOB SERPL: 0.8 {RATIO} (ref 1.1–2.2)
ALP SERPL-CCNC: 75 U/L (ref 45–117)
ALT SERPL-CCNC: 6 U/L (ref 12–78)
ANION GAP SERPL CALC-SCNC: 10 MMOL/L (ref 5–15)
AST SERPL-CCNC: 20 U/L (ref 15–37)
BASOPHILS # BLD: 0 K/UL (ref 0–0.1)
BASOPHILS NFR BLD: 0 % (ref 0–1)
BILIRUB SERPL-MCNC: 0.2 MG/DL (ref 0.2–1)
BUN SERPL-MCNC: 13 MG/DL (ref 6–20)
BUN/CREAT SERPL: 11 (ref 12–20)
CALCIUM SERPL-MCNC: 8.6 MG/DL (ref 8.5–10.1)
CHLORIDE SERPL-SCNC: 99 MMOL/L (ref 97–108)
CO2 SERPL-SCNC: 27 MMOL/L (ref 21–32)
CREAT SERPL-MCNC: 1.18 MG/DL (ref 0.55–1.02)
DIFFERENTIAL METHOD BLD: ABNORMAL
EOSINOPHIL # BLD: 0.1 K/UL (ref 0–0.4)
EOSINOPHIL NFR BLD: 1 % (ref 0–7)
ERYTHROCYTE [DISTWIDTH] IN BLOOD BY AUTOMATED COUNT: 14 % (ref 11.5–14.5)
EST. AVERAGE GLUCOSE BLD GHB EST-MCNC: 100 MG/DL
GLOBULIN SER CALC-MCNC: 3.7 G/DL (ref 2–4)
GLUCOSE BLD STRIP.AUTO-MCNC: 152 MG/DL (ref 65–100)
GLUCOSE BLD STRIP.AUTO-MCNC: 157 MG/DL (ref 65–100)
GLUCOSE SERPL-MCNC: 305 MG/DL (ref 65–100)
HBA1C MFR BLD: 5.1 % (ref 4.2–6.3)
HCT VFR BLD AUTO: 32.8 % (ref 35–47)
HGB BLD-MCNC: 10.5 G/DL (ref 11.5–16)
IMM GRANULOCYTES # BLD: 0 K/UL
IMM GRANULOCYTES NFR BLD AUTO: 0 %
LYMPHOCYTES # BLD: 0.5 K/UL (ref 0.8–3.5)
LYMPHOCYTES NFR BLD: 5 % (ref 12–49)
MAGNESIUM SERPL-MCNC: 1.6 MG/DL (ref 1.6–2.4)
MCH RBC QN AUTO: 31 PG (ref 26–34)
MCHC RBC AUTO-ENTMCNC: 32 G/DL (ref 30–36.5)
MCV RBC AUTO: 96.8 FL (ref 80–99)
MONOCYTES # BLD: 0 K/UL (ref 0–1)
MONOCYTES NFR BLD: 0 % (ref 5–13)
NEUTS SEG # BLD: 9.6 K/UL (ref 1.8–8)
NEUTS SEG NFR BLD: 94 % (ref 32–75)
NRBC # BLD: 0 K/UL (ref 0–0.01)
NRBC BLD-RTO: 0 PER 100 WBC
PLATELET # BLD AUTO: 349 K/UL (ref 150–400)
PMV BLD AUTO: 9.6 FL (ref 8.9–12.9)
POTASSIUM SERPL-SCNC: 4.2 MMOL/L (ref 3.5–5.1)
PROT SERPL-MCNC: 6.5 G/DL (ref 6.4–8.2)
RBC # BLD AUTO: 3.39 M/UL (ref 3.8–5.2)
RBC MORPH BLD: ABNORMAL
SERVICE CMNT-IMP: ABNORMAL
SERVICE CMNT-IMP: ABNORMAL
SODIUM SERPL-SCNC: 136 MMOL/L (ref 136–145)
WBC # BLD AUTO: 10.2 K/UL (ref 3.6–11)

## 2018-03-13 PROCEDURE — 74011250637 HC RX REV CODE- 250/637: Performed by: PHYSICIAN ASSISTANT

## 2018-03-13 PROCEDURE — 82962 GLUCOSE BLOOD TEST: CPT

## 2018-03-13 PROCEDURE — 77030038269 HC DRN EXT URIN PURWCK BARD -A

## 2018-03-13 PROCEDURE — 74011250637 HC RX REV CODE- 250/637: Performed by: NURSE PRACTITIONER

## 2018-03-13 PROCEDURE — 83036 HEMOGLOBIN GLYCOSYLATED A1C: CPT | Performed by: FAMILY MEDICINE

## 2018-03-13 PROCEDURE — 80053 COMPREHEN METABOLIC PANEL: CPT | Performed by: FAMILY MEDICINE

## 2018-03-13 PROCEDURE — 97530 THERAPEUTIC ACTIVITIES: CPT

## 2018-03-13 PROCEDURE — 77030032490 HC SLV COMPR SCD KNE COVD -B

## 2018-03-13 PROCEDURE — 97164 PT RE-EVAL EST PLAN CARE: CPT

## 2018-03-13 PROCEDURE — 77010033678 HC OXYGEN DAILY

## 2018-03-13 PROCEDURE — 74011250637 HC RX REV CODE- 250/637: Performed by: ORTHOPAEDIC SURGERY

## 2018-03-13 PROCEDURE — 77030027138 HC INCENT SPIROMETER -A

## 2018-03-13 PROCEDURE — 36415 COLL VENOUS BLD VENIPUNCTURE: CPT | Performed by: FAMILY MEDICINE

## 2018-03-13 PROCEDURE — 65270000029 HC RM PRIVATE

## 2018-03-13 PROCEDURE — 74011250637 HC RX REV CODE- 250/637: Performed by: FAMILY MEDICINE

## 2018-03-13 PROCEDURE — 74011250636 HC RX REV CODE- 250/636: Performed by: FAMILY MEDICINE

## 2018-03-13 PROCEDURE — 97165 OT EVAL LOW COMPLEX 30 MIN: CPT

## 2018-03-13 PROCEDURE — 83735 ASSAY OF MAGNESIUM: CPT | Performed by: FAMILY MEDICINE

## 2018-03-13 PROCEDURE — 74011250636 HC RX REV CODE- 250/636: Performed by: ORTHOPAEDIC SURGERY

## 2018-03-13 PROCEDURE — 85025 COMPLETE CBC W/AUTO DIFF WBC: CPT | Performed by: FAMILY MEDICINE

## 2018-03-13 PROCEDURE — 97535 SELF CARE MNGMENT TRAINING: CPT

## 2018-03-13 RX ORDER — CEPHALEXIN 250 MG/1
500 CAPSULE ORAL EVERY 8 HOURS
Status: DISCONTINUED | OUTPATIENT
Start: 2018-03-13 | End: 2018-03-14 | Stop reason: HOSPADM

## 2018-03-13 RX ORDER — HYDROCODONE BITARTRATE AND ACETAMINOPHEN 5; 325 MG/1; MG/1
1 TABLET ORAL
Status: DISCONTINUED | OUTPATIENT
Start: 2018-03-13 | End: 2018-03-13

## 2018-03-13 RX ORDER — OXYCODONE AND ACETAMINOPHEN 5; 325 MG/1; MG/1
1 TABLET ORAL
Status: DISCONTINUED | OUTPATIENT
Start: 2018-03-13 | End: 2018-03-14 | Stop reason: HOSPADM

## 2018-03-13 RX ORDER — OXYCODONE AND ACETAMINOPHEN 10; 325 MG/1; MG/1
1 TABLET ORAL
Status: DISCONTINUED | OUTPATIENT
Start: 2018-03-13 | End: 2018-03-14 | Stop reason: HOSPADM

## 2018-03-13 RX ADMIN — MORPHINE SULFATE 1 MG: 2 INJECTION, SOLUTION INTRAMUSCULAR; INTRAVENOUS at 09:37

## 2018-03-13 RX ADMIN — HYDROCODONE BITARTRATE AND ACETAMINOPHEN 1 TABLET: 5; 325 TABLET ORAL at 16:33

## 2018-03-13 RX ADMIN — Medication 10 ML: at 08:28

## 2018-03-13 RX ADMIN — LOSARTAN POTASSIUM 50 MG: 50 TABLET ORAL at 08:26

## 2018-03-13 RX ADMIN — DULOXETINE HYDROCHLORIDE 20 MG: 20 CAPSULE, DELAYED RELEASE ORAL at 08:56

## 2018-03-13 RX ADMIN — ENOXAPARIN SODIUM 40 MG: 40 INJECTION SUBCUTANEOUS at 08:27

## 2018-03-13 RX ADMIN — CEPHALEXIN 500 MG: 250 CAPSULE ORAL at 05:22

## 2018-03-13 RX ADMIN — MORPHINE SULFATE 1 MG: 2 INJECTION, SOLUTION INTRAMUSCULAR; INTRAVENOUS at 14:18

## 2018-03-13 RX ADMIN — LORAZEPAM 1 MG: 2 INJECTION INTRAMUSCULAR; INTRAVENOUS at 22:17

## 2018-03-13 RX ADMIN — Medication 2 G: at 16:33

## 2018-03-13 RX ADMIN — OYSTER SHELL CALCIUM WITH VITAMIN D 1 TABLET: 500; 200 TABLET, FILM COATED ORAL at 11:20

## 2018-03-13 RX ADMIN — HYDROCODONE BITARTRATE AND ACETAMINOPHEN 1 TABLET: 5; 325 TABLET ORAL at 05:22

## 2018-03-13 RX ADMIN — DOCUSATE SODIUM AND SENNOSIDES 1 TABLET: 8.6; 5 TABLET, FILM COATED ORAL at 17:45

## 2018-03-13 RX ADMIN — LORAZEPAM 1 MG: 2 INJECTION INTRAMUSCULAR; INTRAVENOUS at 08:27

## 2018-03-13 RX ADMIN — LEVOTHYROXINE SODIUM 50 MCG: 50 TABLET ORAL at 07:30

## 2018-03-13 RX ADMIN — CEPHALEXIN 500 MG: 250 CAPSULE ORAL at 22:17

## 2018-03-13 RX ADMIN — TRAZODONE HYDROCHLORIDE 50 MG: 50 TABLET ORAL at 01:40

## 2018-03-13 RX ADMIN — OYSTER SHELL CALCIUM WITH VITAMIN D 1 TABLET: 500; 200 TABLET, FILM COATED ORAL at 08:26

## 2018-03-13 RX ADMIN — GABAPENTIN 600 MG: 300 CAPSULE ORAL at 08:26

## 2018-03-13 RX ADMIN — AMLODIPINE BESYLATE 10 MG: 5 TABLET ORAL at 08:26

## 2018-03-13 RX ADMIN — OXYCODONE AND ACETAMINOPHEN 1 TABLET: 10; 325 TABLET ORAL at 20:17

## 2018-03-13 RX ADMIN — Medication 2 G: at 08:27

## 2018-03-13 RX ADMIN — SIMVASTATIN 20 MG: 20 TABLET, FILM COATED ORAL at 17:46

## 2018-03-13 RX ADMIN — OYSTER SHELL CALCIUM WITH VITAMIN D 1 TABLET: 500; 200 TABLET, FILM COATED ORAL at 16:33

## 2018-03-13 RX ADMIN — TRAZODONE HYDROCHLORIDE 50 MG: 50 TABLET ORAL at 00:39

## 2018-03-13 RX ADMIN — FOLIC ACID 1 MG: 1 TABLET ORAL at 08:27

## 2018-03-13 RX ADMIN — TRAZODONE HYDROCHLORIDE 50 MG: 50 TABLET ORAL at 22:17

## 2018-03-13 RX ADMIN — HYDROCODONE BITARTRATE AND ACETAMINOPHEN 1 TABLET: 5; 325 TABLET ORAL at 11:20

## 2018-03-13 RX ADMIN — DOCUSATE SODIUM AND SENNOSIDES 1 TABLET: 8.6; 5 TABLET, FILM COATED ORAL at 08:27

## 2018-03-13 RX ADMIN — GABAPENTIN 600 MG: 300 CAPSULE ORAL at 16:33

## 2018-03-13 RX ADMIN — SODIUM CHLORIDE 75 ML/HR: 900 INJECTION, SOLUTION INTRAVENOUS at 14:22

## 2018-03-13 RX ADMIN — Medication 2 G: at 00:40

## 2018-03-13 RX ADMIN — CARVEDILOL 12.5 MG: 12.5 TABLET, FILM COATED ORAL at 16:33

## 2018-03-13 RX ADMIN — POLYETHYLENE GLYCOL 3350 17 G: 17 POWDER, FOR SOLUTION ORAL at 08:56

## 2018-03-13 RX ADMIN — GABAPENTIN 600 MG: 300 CAPSULE ORAL at 22:16

## 2018-03-13 RX ADMIN — LETROZOLE 2.5 MG: 2.5 TABLET, FILM COATED ORAL at 08:57

## 2018-03-13 RX ADMIN — THERA TABS 1 TABLET: TAB at 08:26

## 2018-03-13 RX ADMIN — HYDROCODONE BITARTRATE AND ACETAMINOPHEN 1 TABLET: 5; 325 TABLET ORAL at 00:39

## 2018-03-13 RX ADMIN — Medication 100 MG: at 08:27

## 2018-03-13 RX ADMIN — HYDROCHLOROTHIAZIDE 25 MG: 25 TABLET ORAL at 08:27

## 2018-03-13 RX ADMIN — CARVEDILOL 12.5 MG: 12.5 TABLET, FILM COATED ORAL at 08:56

## 2018-03-13 RX ADMIN — SODIUM CHLORIDE 125 ML/HR: 900 INJECTION, SOLUTION INTRAVENOUS at 05:23

## 2018-03-13 NOTE — PERIOP NOTES
TRANSFER - OUT REPORT:    Verbal report given to Doctor Umang Larry RN (name) on Silvino Plummer  being transferred to [phone] (unit) for routine post - op       Report consisted of patients Situation, Background, Assessment and   Recommendations(SBAR). Information from the following report(s) SBAR, Kardex, Intake/Output, MAR and Recent Results was reviewed with the receiving nurse. Lines:   Peripheral IV 03/12/18 Left Hand (Active)   Site Assessment Clean, dry, & intact 3/12/2018  8:40 PM   Phlebitis Assessment 0 3/12/2018  8:40 PM   Infiltration Assessment 0 3/12/2018  8:40 PM   Dressing Status Clean, dry, & intact 3/12/2018  8:40 PM   Dressing Type Transparent;Tape 3/12/2018  8:40 PM   Hub Color/Line Status Pink 3/12/2018  8:40 PM   Action Taken Open ports on tubing capped 3/12/2018  4:00 PM   Alcohol Cap Used Yes 3/12/2018  8:40 PM       Peripheral IV Left Antecubital (Active)   Site Assessment Clean, dry, & intact 3/12/2018  9:48 PM   Phlebitis Assessment 0 3/12/2018  8:40 PM   Infiltration Assessment 0 3/12/2018  8:40 PM   Dressing Status Clean, dry, & intact 3/12/2018  8:40 PM   Dressing Type Transparent;Tape 3/12/2018  8:40 PM   Hub Color/Line Status Pink 3/12/2018  8:40 PM   Alcohol Cap Used Yes 3/12/2018  8:40 PM        Opportunity for questions and clarification was provided.       Patient transported with:   Registered Nurse

## 2018-03-13 NOTE — PROGRESS NOTES
Problem: Mobility Impaired (Adult and Pediatric)  Goal: *Acute Goals and Plan of Care (Insert Text)  Physical Therapy Goals  Revised 3/13/2018  1. Patient will move from supine to sit and sit to supine , scoot up and down and roll side to side in bed with modified independence within 7 day(s). 2.  Patient will transfer from bed to chair and chair to bed with modified independence using the least restrictive device within 7 day(s). 3.  Patient will perform sit <> stand with modified independence within 7 day(s). 4.  Patient will ascend/descend 4 stairs with 1 handrail(s) with crutches and NWB RLE and minimal assistance within 7 day(s). Physical Therapy Goals  Revised 3/6/2018- Goals upgraded as progress noted today  1. Patient will move from supine to sit and sit to supine and scoot up and down  in bed with modified independence within 7 day(s). Progress- continue  2. Patient will transfer from bed to chair and chair to bed with supervision/set-up with RW, NWB RLE within 7 day(s). Met - continue to MOD I  3. Patient will perform sit <>stand with modified independence within 7 day(s). Progress- continue  4. Patient will ambulate with supervision/set-up for 10' or less with RW, NWB RLE within 7 day(s). NOT MET - discontinue      Physical Therapy Goals  Initiated 3/3/2018  1. Patient will move from supine to sit and sit to supine  in bed with supervision within 7 day(s). MET 3/6/18 see above  2. Patient will transfer from bed to chair and chair to bed with minimal assistance using the least restrictive device within 7 day(s). Almost Achieved 3/6/18 see above  3. Patient will perform sit to stand with minimal assistance within 7 day(s). Met 3/6/18- see above  4. Patient will ambulate with minimal assistance for 25 feet with the least restrictive device within 7 day(s). NOT MET- HOLD at this time and Continue per orders transfers only       physical Therapy REEVALUATION  Patient: Boyd Camp (39 y.o. female)  Date: 3/13/2018  Primary Diagnosis: RIGHT ANKLE FRACTURE  Ankle fracture, right  ANKLE FRACTURE  Procedure(s) (LRB):  RIGHT ANKLE EXTERNAL FIXATOR REMOVAL, RIGHT ANKLE FUSION WITH WHEELER MEDICAL PLATES (Right) 1 Day Post-Op   Precautions:  Bed Alarm, Fall, NWB (NWB RLE - keep bone stimulator and PRAFO in place)  Chart, physical therapy assessment, plan of care and goals were reviewed. ASSESSMENT :  Based on the objective data described below, the patient presents s/p second ankle surgery POD #1. Patient has been at [facility] for past 11 days. History of ETOH and unsafe to discharge to home without 24/7 supervision and assistance and risk WBing on RLE. Patient now without external fixator but strict NWB RLE for 12 weeks- 3months and wearing cast splint as well as PRAFO atop splint. Also has bone stimulator in place. Per patient and her  their discharge plan between discharge to home with hired assistance while  at work - 24/7 care- or private pay for first 7 days of SNF for rehab. Patient presents with ability to perform stand pivot transfers bed<> chair maintaining NWB RLE with RW. Patient needs much encouragement and focus on importance of NWB long term. Discussed ETOH and fall risk and patient tearful but reports she is done with her drinking. If she discharges to home she will need Wheelchair with elevating legrests, RW, and BSC. Family members are moving a recliner chair to her home and she plans to sleep in recliner instead of regular bed. Discussed hospital bed as well as knee walker. Left message for heather Al NP regarding knee walker. May be something to try at much later date if Dr Mirna Brito approves WBAT on anterior RLE. For now patient will need to mobilize about the home from wheelchair level and transfers only. Will give gait belt for home. Will speak with CM regarding D/C plan and DME. Patient requests to review exercise program (issued by this PT) later this afternoon.  Patient left up in chair NAD on chair alarm- positioned for comfort. Will need to follow up and confirm no steps to enter home for discharge. If no steps to enter, and DME as well as 24/7 supervision/care arranged then patient ready for discharge to home from PT standpoint. Patient will benefit from skilled intervention to address the above impairments. Patients rehabilitation potential is considered to be Good  Factors which may influence rehabilitation potential include:   []           None noted  []           Mental ability/status  []           Medical condition  []           Home/family situation and support systems  []           Safety awareness  [x]           Pain tolerance/management  []           Other:      PLAN :  Recommendations and Planned Interventions:  [x]             Bed Mobility Training             []      Neuromuscular Re-Education  [x]             Transfer Training                   []      Orthotic/Prosthetic Training  []             Gait Training                         []      Modalities  [x]             Therapeutic Exercises           []      Edema Management/Control  [x]             Therapeutic Activities            [x]      Patient and Family Training/Education  []             Other (comment):  Frequency/Duration: Patient will be followed by physical therapy twice daily to address goals. Discharge Recommendations: Home Health  Further Equipment Recommendations for Discharge: bedside commode, hospital bed, rolling walker and wheelchair with elevating legrests, gait belt     SUBJECTIVE:   Patient stated I am not sure this bone stimulator is working.     OBJECTIVE DATA SUMMARY:     Past Medical History:   Diagnosis Date    Alcohol abuse     Anxiety     Depression     Fatigue     Hypertension     Memory loss     Vertigo     Visual disturbance      Past Surgical History:   Procedure Laterality Date    HX ANKLE FRACTURE TX Right 12/2016    HX APPENDECTOMY      HX ORTHOPAEDIC      right knee repair    HX OTHER SURGICAL  04/2016    double mastectomy     HX OTHER SURGICAL  06/2016    reconstructive surgery on her breasts    HX SHOULDER REPLACEMENT Right 11/2016    HX WRIST FRACTURE TX Left 12/2016     Hospital course since last seen and reason for reevaluation:   Critical Behavior:  Neurologic State: Alert  Orientation Level: Oriented X4  Cognition: Appropriate decision making, Appropriate for age attention/concentration, Appropriate safety awareness, Follows commands  Safety/Judgement: Awareness of environment, Insight into deficits    Strength:    Strength: Within functional limits                      Tone & Sensation:   Tone: Normal              Sensation: Impaired               Range Of Motion:  AROM: Within functional limits           PROM: Within functional limits           Coordination:  Coordination: Within functional limits    Functional Mobility:  Bed Mobility:     Supine to Sit: Supervision (head of bed fully upright)  Sit to Supine: Supervision  Scooting: Supervision  Transfers:  Sit to Stand: Contact guard assistance; Additional time;Assist x1  Stand to Sit: Contact guard assistance; Additional time;Assist x1  Stand Pivot Transfers: Minimum assistance     Bed to Chair: Minimum assistance; Additional time;Assist x1           Balance:   Sitting: Intact  Standing - Static: Constant support; Fair  Standing - Dynamic : Fair  Ambulation/Gait Training:  Distance (ft): 2 Feet (ft)  Assistive Device: Gait belt;Walker, rolling  Ambulation - Level of Assistance: Minimal assistance; Additional time;Assist x1        Gait Abnormalities: Antalgic  Right Side Weight Bearing: Non-weight bearing  Left Side Weight Bearing: As tolerated  Base of Support: Shift to left     Speed/Carri: Pace decreased (<100 feet/min)          Therapeutic Exercises:   See previously issued program of bed exercises    Functional Measure:  Tinetti test:    Sitting Balance: 1  Arises: 0  Attempts to Rise: 0  Immediate Standing Balance: 1  Standing Balance: 1  Nudged: 1  Eyes Closed: 1  Turn 360 Degrees - Continuous/Discontinuous: 0  Turn 360 Degrees - Steady/Unsteady: 0  Sitting Down: 1  Balance Score: 6  Indication of Gait: 1  R Step Length/Height: 0  L Step Length/Height: 0  R Foot Clearance: 0  L Foot Clearance: 1  Step Symmetry: 0  Step Continuity: 0  Path: 1  Trunk: 0  Walking Time: 0  Gait Score: 3  Total Score: 9       Tinetti Test and G-code impairment scale:  Percentage of Impairment CH    0%   CI    1-19% CJ    20-39% CK    40-59% CL    60-79% CM    80-99% CN     100%   Tinetti  Score 0-28 28 23-27 17-22 12-16 6-11 1-5 0       Tinetti Tool Score Risk of Falls  <19 = High Fall Risk  19-24 = Moderate Fall Risk  25-28 = Low Fall Risk  Tinetti ME. Performance-Oriented Assessment of Mobility Problems in Elderly Patients. [facility] 66; [id number]. (Scoring Description: PT Bulletin Feb. 10, 1993)    Older adults: Shiv Segundo et al, 2009; n = 1000 [facility] elderly evaluated with ABC, TANESHA, ADL, and IADL)  · Mean TANESHA score for males aged 69-68 years = 26.21(3.40)  · Mean TANESHA score for females age 69-68 years = 25.16(4.30)  · Mean TANESHA score for males over 80 years = 23.29(6.02)  · Mean TANESHA score for females over 80 years = 17.20(8.32)       G codes: In compliance with CMSs Claims Based Outcome Reporting, the following G-code set was chosen for this patient based on their primary functional limitation being treated: The outcome measure chosen to determine the severity of the functional limitation was the Tinetti Test with a score of 9/28 which was correlated with the impairment scale.     ? Mobility - Walking and Moving Around:     - CURRENT STATUS: CL - 60%-79% impaired, limited or restricted    - GOAL STATUS: CK - 40%-59% impaired, limited or restricted    - D/C STATUS:  ---------------To be determined---------------     Pain:  Pain Scale 1: Numeric (0 - 10)  Pain Intensity 1: 8  Pain Location 1: Foot  Pain Orientation 1: Right  Pain Description 1: Aching  Pain Intervention(s) 1: Medication (see MAR)  Activity Tolerance:   Fair   Please refer to the flowsheet for vital signs taken during this treatment. After treatment:   [x]  Patient left in no apparent distress sitting up in chair  []  Patient left in no apparent distress in bed  [x]  Call bell left within reach  [x]  Nursing notified  [x]  Caregiver present  [x]   alarm activated    COMMUNICATION/EDUCATION:   The patients plan of care was discussed with: Registered Nurse. [x]  Fall prevention education was provided and the patient/caregiver indicated understanding. [x]  Patient/family have participated as able in goal setting and plan of care. []  Patient/family agree to work toward stated goals and plan of care. []  Patient understands intent and goals of therapy, but is neutral about his/her participation. []  Patient is unable to participate in goal setting and plan of care.     Thank you for this referral.  Thomas Solomon, PT   Time Calculation: 25 mins

## 2018-03-13 NOTE — PROGRESS NOTES
3/13/2018     12:06 PM  CM called Nuris Rivera with [facility] who provided a rough estimate of daily cost out of pocket at $500 including PT with a 7 day minimum stay. CM talked with pt's  who related that this was not affordable. CM provided  with another [address] listing, and explained to him the benefits of this additional assistance. CM offered to assist pt's  in coordinating a personal care aide for discharge and DME; however, pt's  refused.  reported he has not yet secured the [facility] and wheelchair, but is working independently with a [address] he is familiar with. Pt's  expressed intent to set up a personal care aide with a company from provided listing. CM provided [facility] information. 10:39 AM  CM received acceptance from [facility]. CM spoke with  who reported he has secured a RW, BSC, and wheelchair for pt's DC. Pt's  related that they wish for pt to go to the [facility] for 2-3 days post DC, and they are willing to pay out of pocket. CM to call for pricing. 9:12 AM  CM consult for HH noted. CM completed referral to [facility] and is awaiting response.

## 2018-03-13 NOTE — PROGRESS NOTES
Nutrition Assessment:    RECOMMENDATIONS/INTERVENTION(S):     1. Encourage po intake on Regular diet and snacks to promote healing   2. Continue MVI, thiamine, folic acid  3. Continue to monitor & replete lytes prn      Will monitor the plan of care, PO intakes, labs, wt & nutrition needs post-op    ASSESSMENT:   3/13: Consult received for general nutrition management and supplements. Also seen for f/u. Pt is POD # 1 right ankle external fixator removal, right ankle fusion with [employer] medical plates. Visited pt who states her appetite was not as good at breakfast, she at one third of her omelet. States she has not received any snacks. I discussed with her and  the importance of eating well after surgery and protein sources reviewed. Appropriate snacks ordered, and discussed with kitchen as well. Encouraged to continue to eat well after discharge for healing. Pt food preferences verbalized  and message sent to kitchen. 3/8:  Pt seen for LOS. 61 yof admitted for R ankle fx. Ortho following - plans for surgery next week. Hx of Etoh. Labs/meds reviewed. Etoh vitamins. K repleted. S/p MARYJO. Na WDL. No Mag, Phos. Regular diet x 5 days. Visited pt this afternoon. Tolerating diet w/ good appetite & PO intakes, eating \"all\" of meals. Ordering per preferences - eating a variety per meal recall. Decreased appetite x 1 wk PTA (pain?). #.  3.2% wt loss x 1 wk PTA if accurate - wt hx not available. Normal BMI. No mention of Etoh. Pt requesting snacks between meals (barbie irwin, PB, fruit). Current diet appropriate to meet nutrition needs - reassess post-op. SUBJECTIVE/OBJECTIVE:     Diet Order: Regular, snacks TID  % Eaten:  No data found.     Pertinent Medications: [x] Reviewed  Past Medical History:   Diagnosis Date    Alcohol abuse     Anxiety     Depression     Fatigue     Hypertension     Memory loss     Vertigo     Visual disturbance        Chemistries:  Lab Results Component Value Date/Time    Sodium 136 03/13/2018 01:35 AM    Potassium 4.2 03/13/2018 01:35 AM    Chloride 99 03/13/2018 01:35 AM    CO2 27 03/13/2018 01:35 AM    Anion gap 10 03/13/2018 01:35 AM    Glucose 305 (H) 03/13/2018 01:35 AM    BUN 13 03/13/2018 01:35 AM    Creatinine 1.18 (H) 03/13/2018 01:35 AM    BUN/Creatinine ratio 11 (L) 03/13/2018 01:35 AM    GFR est AA 56 (L) 03/13/2018 01:35 AM    GFR est non-AA 46 (L) 03/13/2018 01:35 AM    Calcium 8.6 03/13/2018 01:35 AM    AST (SGOT) 20 03/13/2018 01:35 AM    Alk. phosphatase 75 03/13/2018 01:35 AM    Protein, total 6.5 03/13/2018 01:35 AM    Albumin 2.8 (L) 03/13/2018 01:35 AM    Globulin 3.7 03/13/2018 01:35 AM    A-G Ratio 0.8 (L) 03/13/2018 01:35 AM    ALT (SGPT) 6 (L) 03/13/2018 01:35 AM      Anthropometrics: Height: 5' 7\" (170.2 cm) Weight: 69.9 kg (154 lb)    IBW (%IBW): 67.5 kg (148 lb 13 oz) (104.15 %) UBW (%UBW): 72.6 kg (160 lb) (96.86 %)    BMI: Body mass index is 24.12 kg/(m^2). This BMI is indicative of:   [] Underweight    [x] Normal    [] Overweight    []  Obesity    []  Extreme Obesity (BMI>40)  Estimated Nutrition Needs (Based on): 1700 Kcals/day , 70 g (to 80 gms ( 1.0-1.1 gms/kg)) Protein  Carbohydrate: At Least 130 g/day  Fluids: 1700 -2100 mL/day    Last BM: 3/13, abd WDL    []Active     []Hyperactive  []Hypoactive       [] Absent   BS - not charted  Skin:    [] Intact   [x] Incision: R foot from surgery  [] Breakdown   [] DTI   [x] Tears/Excoriation/Abrasion - R ankle  [x]Edema: LLE, RLE [] Other:      Wt Readings from Last 30 Encounters:   03/12/18 69.9 kg (154 lb)   08/24/16 74.5 kg (164 lb 3.2 oz)   02/08/16 76.1 kg (167 lb 12.8 oz)   12/31/15 72.7 kg (160 lb 3.2 oz)   12/07/15 69.9 kg (154 lb 1.6 oz)   10/23/15 68 kg (150 lb)   07/06/15 68 kg (150 lb)      NUTRITION DIAGNOSES:   Problem:  Increased nutrient needs     Etiology: related to wound healing      Signs/Symptoms: as evidenced by s/p R ankle external fixator removal and R ankle fusion      NUTRITION INTERVENTIONS:  Meals/Snacks:  Other (protien sources for healing)                  GOAL:   Pt will increase po intake at meals and snacks for healing prior to discharge    Cultural, Islam, or Ethnic Dietary Needs: None     EDUCATION & DISCHARGE NEEDS:    [] None Identified   [x] Identified and Education Provided/Documented   [] Identified and Pt declined/was not appropriate      [x] Interdisciplinary Care Plan Reviewed/Documented    [x] Discharge Needs:    TBD   [] No Nutrition Related Discharge Needs    NUTRITION RISK:   Pt Is At Nutrition Risk  [x]     No Nutrition Risk Identified  []       PT SEEN FOR:    []  MD Consult: []Calorie Count      []Diabetic Diet Education        []Diet Education     []Electrolyte Management     []General Nutrition Management and Supplements     []Management of Tube Feeding     []TPN Recommendations    []  RN Referral:  []MST score >=2     []Enteral/Parenteral Nutrition PTA     []Pregnant: Gestational DM or Multigestation                 [] Pressure Ulcer    []  Low BMI      []  Length of Stay       [] Dysphagia Diet         [] Ventilator  [x]  Follow-up     Previous Recommendations:   [] Implemented          [] Not Implemented          [] Not Applicable    Previous Goal:   [] Met              [x] Progressing Towards Goal              [] Not Progressing Towards Goal   [x] Not Applicable            Genny Shane RD

## 2018-03-13 NOTE — ANESTHESIA POSTPROCEDURE EVALUATION
Post-Anesthesia Evaluation and Assessment    Patient: Kyleigh Bennett MRN: [id number]  SSN: [id number]    YOB: 1955  Age: 61 y.o. Sex: female       Cardiovascular Function/Vital Signs  Visit Vitals    /69    Pulse 91    Temp 37.1 °C (98.8 °F)    Resp 15    Ht 5' 7\" (1.702 m)    Wt 69.9 kg (154 lb)    SpO2 100%    Breastfeeding No    BMI 24.12 kg/m2       Patient is status post general, regional anesthesia for Procedure(s):  RIGHT ANKLE EXTERNAL FIXATOR REMOVAL, RIGHT ANKLE FUSION WITH [employer] MEDICAL PLATES. Nausea/Vomiting: None    Postoperative hydration reviewed and adequate. Pain:  Pain Scale 1: Numeric (0 - 10) (03/12/18 2040)  Pain Intensity 1: 6 (03/12/18 2040)   Managed    Neurological Status:   Neuro (WDL): Within Defined Limits (03/12/18 2040)  Neuro  Neurologic State: Alert (03/12/18 2040)  Orientation Level: Appropriate for age (03/12/18 2040)  Cognition: Appropriate for age attention/concentration; Follows commands (03/12/18 2040)  Speech: Clear (03/12/18 2040)  LUE Motor Response: Purposeful (03/12/18 2040)  LLE Motor Response: Purposeful (03/12/18 2040)  RUE Motor Response: Purposeful (03/12/18 2040)  RLE Motor Response: Purposeful;Weak (03/12/18 2040)   At baseline    Mental Status and Level of Consciousness: Arousable    Pulmonary Status:   O2 Device: Nasal cannula (03/12/18 2048)   Adequate oxygenation and airway patent    Complications related to anesthesia: None    Post-anesthesia assessment completed.  No concerns    Signed By: Tanika Alvarez MD     March 12, 2018

## 2018-03-13 NOTE — PROGRESS NOTES
Subjective:    Junito Murray is a 61 y.o. female who is POD 1 s/p exfix removal and ORIF RIGHT trimalleolar ankle fx with syndesmosis fixation    Major Events:. Pain controlled, denies cp/sob    Objective:    Vital signs in last 24 hours:    Temp:  [97.6 °F (36.4 °C)-99.2 °F (37.3 °C)]   Pulse (Heart Rate):  []   BP: ()/(53-90)   Resp Rate:  [10-20]   O2 Sat (%):  [88 %-100 %]   Weight:  [69.9 kg (154 lb)]     Temp (24hrs), Av.6 °F (37 °C), Min:98.1 °F (36.7 °C), Max:99.2 °F (37.3 °C)      Labs:    Lab Results   Component Value Date/Time    WBC 10.2 2018 01:35 AM    HGB 10.5 (L) 2018 01:35 AM    HCT 32.8 (L) 2018 01:35 AM    PLATELET 747  01:35 AM       Lab Results   Component Value Date/Time    Sodium 136 2018 01:35 AM    Potassium 4.2 2018 01:35 AM    Chloride 99 2018 01:35 AM    CO2 27 2018 01:35 AM    BUN 13 2018 01:35 AM       Physical Exam:    General: alert, cooperative, in NAD  Nonlabored resp    RIGHT Lower extremity    Dressing: splint c/d/i, elevated, bone stim in place      Motor: + toes df/pf    Sensory: SILT    Vascular: Brisk capillary refill in toes    Assessment/Plan:    · Pain management: as written    · DVT Prophylaxis: Lovenox 40mg sc daily x 4 weeks, then transition to aspirin 81mg PO BID x 2 weeks    · PT/OT: BEKAH BELCHER    · Dispo: pending PT evaluation    F/u: [facility] 2-3 weeks Huyen 7.  [facility], MD

## 2018-03-13 NOTE — PROGRESS NOTES
Problem: Self Care Deficits Care Plan (Adult)  Goal: *Acute Goals and Plan of Care (Insert Text)  Occupational Therapy Goals  Initiated 3/13/2018  1. Patient will perform lower body dressing with minimal assistance/contact guard assist within 7 day(s). 2.  Patient will perform toilet transfers with supervision/set-up within 7 day(s). 3.  Patient will perform all aspects of toileting with supervision/set-up within 7 day(s). 4.  Patient will participate in upper extremity therapeutic exercise/activities with supervision/set-up for 10 minutes within 7 day(s). 5.  Patient will utilize energy conservation techniques during functional activities with verbal cues within 7 day(s). Occupational Therapy EVALUATION  Patient: Joann Mott (61 y.o. female)  Date: 3/13/2018  Primary Diagnosis: RIGHT ANKLE FRACTURE  Ankle fracture, right  ANKLE FRACTURE  Procedure(s) (LRB):  RIGHT ANKLE EXTERNAL FIXATOR REMOVAL, RIGHT ANKLE FUSION WITH WHEELER MEDICAL PLATES (Right) 1 Day Post-Op   Precautions:   Bed Alarm, Fall, NWB (NWB RLE - keep bone stimulator and PRAFO in place)    ASSESSMENT :  Based on the objective data described below, the patient presents with hospital admission secondary to R ankle fracture with R ankle external fixator. Patient has been in hospital x11 days. Surgical removal of external fixator yesterday and now POD 1 right ankle fusion, with new orders for OT. Patient orders for NWB at all times keeping bone stimulator and PRAFO in place. Patient tearful during OT session as she was informed by MD that she must remain NWB for 3 mos or risk possible amputation. Patient reports need to void and agreeable to SPT to [facility]. Patient able to perform sit to stand with CGA but with difficulty performing transfer requiring minimum assistance. Patient manages hygiene in sitting with supervision. Upon standing patient transfers back to [facility] and able to maintain NWB to RLE.   Patient with questions regarding dressing tasks and questions need to cut pants to fit over R foot vs wearing dresses or skirts. Per patient spouse/case management to secure [facility] and other DME for home use. Patient will benefit from continued OT services to increase safety and independence with ADL tasks. She will require 24hr assist at home for safety to ensure NWB status and history of ETOH abuse which lead to initial fall/ankle fracture. Patient will benefit from skilled intervention to address the above impairments. Patients rehabilitation potential is considered to be Good  Factors which may influence rehabilitation potential include:   []             None noted  []             Mental ability/status  []             Medical condition  []             Home/family situation and support systems  []             Safety awareness  [x]             Pain tolerance/management  []             Other:      PLAN :  Recommendations and Planned Interventions:  [x]               Self Care Training                  [x]        Therapeutic Activities  [x]               Functional Mobility Training    []        Cognitive Retraining  [x]               Therapeutic Exercises           [x]        Endurance Activities  [x]               Balance Training                   []        Neuromuscular Re-Education  []               Visual/Perceptual Training     [x]   Home Safety Training  [x]               Patient Education                 [x]        Family Training/Education  []               Other (comment):    Frequency/Duration: Patient will be followed by occupational therapy 5 times a week to address goals. Discharge Recommendations: Home Health  Further Equipment Recommendations for Discharge: BSC, RW, wheelchair     SUBJECTIVE:   Patient stated [facility] will I get dressed? Rosalia Hernandez    OBJECTIVE DATA SUMMARY:   HISTORY:   Past Medical History:   Diagnosis Date    Alcohol abuse     Anxiety     Depression     Fatigue     Hypertension     Memory loss     Vertigo     Visual disturbance      Past Surgical History:   Procedure Laterality Date    HX ANKLE FRACTURE TX Right 12/2016    HX APPENDECTOMY      HX ORTHOPAEDIC      right knee repair    HX OTHER SURGICAL  04/2016    double mastectomy     HX OTHER SURGICAL  06/2016    reconstructive surgery on her breasts    HX SHOULDER REPLACEMENT Right 11/2016    HX WRIST FRACTURE TX Left 12/2016       Prior Level of Function/Environment/Context: independent  Occupations in which the patient is/was successful, what are the barriers preventing that success:   Performance Patterns (routines, roles, habits, and rituals):   Personal Interests and/or values:   Expanded or extensive additional review of patient history:     Home Situation  Home Environment: Private residence  # Steps to Enter: 0  One/Two Story Residence: One story  Living Alone: No  Support Systems: Spouse/Significant Other/Partner  Patient Expects to be Discharged to[de-identified] Rehabilitation facility  Current DME Used/Available at Home: None  Tub or Shower Type: Tub/Shower combination  [x]  Right hand dominant   []  Left hand dominant    EXAMINATION OF PERFORMANCE DEFICITS:  Cognitive/Behavioral Status:  Neurologic State: Alert  Orientation Level: Oriented X4  Cognition: Follows commands  Perception: Appears intact  Perseveration: No perseveration noted  Safety/Judgement: Awareness of environment    Skin: intact as seen    Edema: RLE    Hearing: Auditory  Auditory Impairment: None    Vision/Perceptual:                                Corrective Lenses: Glasses    Range of Motion:    AROM: Within functional limits  PROM: Within functional limits                      Strength:  Strength: Within functional limits                Coordination:  Coordination: Within functional limits  Fine Motor Skills-Upper: Left Intact; Right Intact    Gross Motor Skills-Upper: Left Intact; Right Intact    Tone & Sensation:  Tone: Normal  Sensation: Impaired                      Balance:  Sitting: Intact  Standing: Impaired  Standing - Static: Constant support; Fair  Standing - Dynamic : Fair    Functional Mobility and Transfers for ADLs:  Bed Mobility:  Supine to Sit: Supervision (head of bed fully upright)  Sit to Supine: Supervision  Scooting: Supervision    Transfers:  Sit to Stand: Contact guard assistance; Additional time;Assist x1  Stand to Sit: Contact guard assistance; Additional time;Assist x1  Bed to Chair: Minimum assistance; Additional time;Assist x1  Toilet Transfer : Minimum assistance (verbal cues )    ADL Assessment:  Feeding: Independent    Oral Facial Hygiene/Grooming: Supervision;Setup (seated in chair )    Bathing: Contact guard assistance (seated on commode )    Upper Body Dressing: Supervision    Lower Body Dressing: Moderate assistance    Toileting: Minimum assistance                ADL Intervention and task modifications:                                     Cognitive Retraining  Safety/Judgement: Awareness of environment    Therapeutic Exercise:     Functional Measure:  Barthel Index:    Bathin  Bladder: 10  Bowels: 10  Groomin  Dressin  Feeding: 10  Mobility: 5  Stairs: 0  Toilet Use: 5  Transfer (Bed to Chair and Back): 10  Total: 60       Barthel and G-code impairment scale:  Percentage of impairment CH  0% CI  1-19% CJ  20-39% CK  40-59% CL  60-79% CM  80-99% CN  100%   Barthel Score 0-100 100 99-80 79-60 59-40 20-39 1-19   0   Barthel Score 0-20 20 17-19 13-16 9-12 5-8 1-4 0      The Barthel ADL Index: Guidelines  1. The index should be used as a record of what a patient does, not as a record of what a patient could do. 2. The main aim is to establish degree of independence from any help, physical or verbal, however minor and for whatever reason. 3. The need for supervision renders the patient not independent. 4. A patient's performance should be established using the best available evidence.  Asking the patient, friends/relatives and nurses are the usual sources, but direct observation and common sense are also important. However direct testing is not needed. 5. Usually the patient's performance over the preceding 24-48 hours is important, but occasionally longer periods will be relevant. 6. Middle categories imply that the patient supplies over 50 per cent of the effort. 7. Use of aids to be independent is allowed. Cameron Cooper., Barthel, D.W. (4363). Functional evaluation: the Barthel Index. [address] (14)2. CHULA Aguirre, Arik Mitchell., [facility]., [place], [address] [facility] (1999). Measuring the change indisability after inpatient rehabilitation; comparison of the responsiveness of the Barthel Index and Functional [place] Measure. Journal of Neurology, Neurosurgery, and Psychiatry, 66(4), 937-561. CHICA Jeffrey, NEYDA Winkler, & Deny Salgado M.A. (2004.) Assessment of post-stroke quality of life in cost-effectiveness studies: The usefulness of the Barthel Index and the EuroQoL-5D. Quality of Life Research, 13, 394-67         G codes: In compliance with CMSs Claims Based Outcome Reporting, the following G-code set was chosen for this patient based on their primary functional limitation being treated: The outcome measure chosen to determine the severity of the functional limitation was the Barthel Index with a score of 60/100 which was correlated with the impairment scale. ?  Self Care:     - CURRENT STATUS: CJ - 20%-39% impaired, limited or restricted    - GOAL STATUS: CI - 1%-19% impaired, limited or restricted    - D/C STATUS:  ---------------To be determined---------------     Occupational Therapy Evaluation Charge Determination   History Examination Decision-Making   LOW Complexity : Brief history review  LOW Complexity : 1-3 performance deficits relating to physical, cognitive , or psychosocial skils that result in activity limitations and / or participation restrictions  MEDIUM Complexity : Patient may present with comorbidities that affect occupational performnce. Miniml to moderate modification of tasks or assistance (eg, physical or verbal ) with assesment(s) is necessary to enable patient to complete evaluation       Based on the above components, the patient evaluation is determined to be of the following complexity level: MEDIUM  Pain:  Pain Scale 1: Numeric (0 - 10)  Pain Intensity 1: 8  Pain Location 1: Foot  Pain Orientation 1: Right  Pain Description 1: Aching  Pain Intervention(s) 1: Medication (see MAR)  Activity Tolerance:   VSS  Please refer to the flowsheet for vital signs taken during this treatment. After treatment:   [x] Patient left in no apparent distress sitting up in chair  [] Patient left in no apparent distress in bed  [x] Call bell left within reach  [x] Nursing notified  [] Caregiver present  [x] Bed alarm activated    COMMUNICATION/EDUCATION:   The patients plan of care was discussed with: Physical Therapist and Registered Nurse. [x] Home safety education was provided and the patient/caregiver indicated understanding. [x] Patient/family have participated as able in goal setting and plan of care. [x] Patient/family agree to work toward stated goals and plan of care. [] Patient understands intent and goals of therapy, but is neutral about his/her participation. [] Patient is unable to participate in goal setting and plan of care. This patients plan of care is appropriate for delegation to \[facility]\"".     Thank you for this referral.  Valeria Sy OTR/L  Time Calculation: 23 mins

## 2018-03-13 NOTE — OP NOTES
[address] REPORT    Melchor Alexandra  MR#: [id number]  : 1955  ACCOUNT #: [de-identified]   DATE OF SERVICE: 2018    PREOPERATIVE DIAGNOSIS:  Right open comminuted trimalleolar ankle fracture. POSTOPERATIVE DIAGNOSIS:  Right open comminuted trimalleolar ankle fracture. PROCEDURES PERFORMED:  1. Right ankle external fixator removal.  2.  Open reduction, internal fixation right trimalleolar ankle fracture. 3.  Open reduction, internal fixation right syndesmosis. SURGEON:  Mainor Caldwell MD.     ASSISTANT:  Omar Hairston and Toy Newberry. ANESTHESIA:  General.    ESTIMATED BLOOD LOSS:  5 mL. SPECIMENS REMOVED:  None. FINDINGS:  Severely comminuted right distal fibula fracture, comminuted right medial malleolus fracture, stable avulsion posterior malleolus fracture and right syndesmosis disruption. COMPLICATIONS:  None. IMPLANTS:  [employer] plate and screws and Tiff bone graft 10 mL. TOURNIQUET TIME:  Right nonsterile well-padded upper thigh tourniquet at 275 mmHg for 106 minutes. INDICATIONS FOR PROCEDURE:  This is a 59-year-old female who approximately two weeks ago suffered a right ankle fracture dislocation. Due to her neuropathy, she did not immediately seek medical care and then on Friday morning, she had erosion of the medial aspect of her distal tibia through the skin and she presented to [facility] where she was taken to the operating room for irrigation, debridement, closed reduction and external fixator placement by my partner, Dr. Castro Pinto.  He referred her to me for definitive fixation. I had a long discussion with the patient and discussed all her options at length including ORIF of the ankle fracture versus primary ankle fusion. Given her severe neuropathy and also history of alcoholism, she is a very high risk for failure of this construct due to noncompliance with weightbearing.   I discussed this at length with her and offered both options. She ultimately decided to proceed with a joint sparing procedure with ORIF of her ankle fracture. She knows that she is high risk for developing arthritis as well as nonunion because of her neuropathy and alcoholism. I discussed the risks and benefits of conservative versus surgical management options at length with the patient. All questions were answered to the best of my ability. The risks of surgery include but not limited to complications with anesthesia including death, pain, bleeding, infection, damage to surrounding structures, nonunion, malunion, DVT, PE, the need for further surgery, ankle arthritis, infection that cannot be cleared resulting in an amputation. The patient verbalized understanding and elects to proceed with surgical intervention. DESCRIPTION OF PROCEDURE:  The correct patient, extremity, operation were all identified in the preoperative holding area and I marked her right ankle. She was then taken back to the operating room and placed successfully under general anesthesia and placed in the sloppy lateral position on the beanbag and secured to the bed with a safety strap and all bony prominences were padded well. The right lower extremity was then prepped and draped in the usual sterile fashion and a preoperative timeout was called. All parties were in agreement and the correct patient, extremity and operation were identified, she received IV Ancef 2 grams prior to incision and we proceeded with the operation. The Esmarch bandage was used to exsanguinate the limb and the tourniquet was raised. Attention was first turned to the medial side of the ankle. She had an oblique wound over the medial malleolus that had previously been stitched. A separate anterior medial incision was made over the anterior medial aspect of the ankle joint curved distally and posteriorly around the distal aspect of the medial malleolus. Sharp dissection was carried down through the level of the skin and blunt dissection carried down to the level of bone, taking care to protect the saphenous vein and nerve. The medial malleolus fracture was identified, reduced in anatomic position and held in place with provisional K wires. Fluoroscopic imaging showed anatomic reduction of her medial ankle mortise and the medial shoulder of the joint. There was some bone loss on the medial cortex of the distal tibia at the medial malleolus. The bone lined up very well and these wires were confirmed to be in good position on the AP, mortise and lateral planes. They were overdrilled and two 4.0 x 50 mm partially threaded cannulated screws were placed over the wires holding the fracture in place. The wires were then removed. Attention was then turned to the distal fibula. A longitudinal incision was made over the distal fibula. Sharp dissection was carried down through the level of skin and blunt dissection carried down to the level of the bone, taking care to protect the superficial peroneal nerve, which was visualized identified and protected throughout the entirety of this portion of the case. The fracture was identified and was found to be severely comminuted and there was bone loss due to comminution. A plate was then placed on the lateral aspect of the bone and the bone was pulled out to length and the fracture was reduced and anatomic fibular length was restored. The plate was held to the bone provisionally with K wires. The plate was then secured to the bone distally with a combination of locking and nonlocking screws. With the plate in place, fluoroscopic imaging showed restoration of anatomic length of the distal fibula fracture. The alignment was anatomic except for the bone loss at the distal third of the tibia just proximal to the ankle joint.   The plate was then further fixed to the bone both proximally and distally with cortical screws. Fluoroscopic imaging showed satisfactory reduction of the fracture. Because of the bone loss, Tiff bone graft was used to fill in the void and fluoroscopic imaging showed that the bone graft was placed in good position filling the void. An external rotation stress view was taken which showed abnormal syndesmosis widening as well as medial clear space widening. Also, given her neuropathy, I made the decision to place several syndesmosis screws. The ankle was held in neutral dorsiflexion. A clamp was placed from the medial and lateral malleoli, gentle pressure was placed on the clamp and holes were drilled in the distal fibula starting approximately 1 cm proximal to the joint and they were parallel to the joint. These drill holes were placed from the fibula into the tibia across all four cortices and filled with 4.0 fully threaded cancellous screws. Four syndesmosis screws were placed and found to be in good position with fluoroscopic imaging. Final x-rays were taken in the AP, oblique and lateral planes showing satisfactory reduction of the ankle fracture, good position of the plate and screws and the bone void was filled with bone graft in the fibula and also at the comminuted area of the medial aspect of the medial cortex of the medial malleolus. The screws did not violate the joint and found to be in good position. The wounds were copiously irrigated with antibiotic-laden normal saline. The deep tissue closed over the fibula with 0 Vicryl suture, the subcutaneous layer closed over the medial malleolus with 3.0 Vicryl suture and the skin closed with 3.0 nylon suture. A sterile dressing was applied. Xeroform dressing was placed over the holes from the external fixator pins and a well-padded, well-molded short leg splint was applied to the right lower extremity and the tourniquet was dropped.   The patient was then awakened from anesthesia and taken to recovery room in hemodynamically stable condition. All lap and sharp counts were correct at the end of the case. POSTOPERATIVE CARE:  The patient will be admitted back to the floor to the family medicine service. She will be nonweightbearing to the right lower extremity. I had a long discussion with the patient and her  and she is going to be under strict nonweightbearing restrictions for at least three months until 06/12/2018. They both verbalized understanding to this restriction. She will also continue Lovenox 40 mg subq daily for DVT prophylaxis for four weeks postoperatively and then transition to aspirin 81 mg p.o. b.i.d. She will see me in clinic in 2-3 weeks for followup.       Nelson Bennett MD       PHSANDRA / CURTIS  D: 03/12/2018 20:55     T: 03/13/2018 08:14  JOB #: [id number]

## 2018-03-13 NOTE — BRIEF OP NOTE
BRIEF OPERATIVE NOTE    Date of Procedure: 3/12/2018   Preoperative Diagnosis: ANKLE FRACTURE  Postoperative Diagnosis: ANKLE FRACTURE    Procedure(s):  RIGHT ANKLE EXTERNAL FIXATOR REMOVAL, ORIF RIGHT TRIMALLEOLAR ANKLE FRACTURE, ORIF RIGHT SYNDESMOSIS  WITH WHEELER MEDICAL PLATES  Surgeon(s) and Role:     Gayatri Laura MD - Primary         Assistant Staff: None      Surgical Staff:  Circ-1: Francesca Lennox, RN  Scrub Tech-1: Rodney Avila  Surg Asst-1: Marilu Jerome LPN  Surg Asst-Relief: Elana Gutierrez RN  Event Time In   Incision Start 1844   Incision Close 2025     Anesthesia: General   Estimated Blood Loss: 5cc  Specimens: * No specimens in log *   Findings: Severely Comminuted RIGHT distal fibula fracture, comminuted RIGHT medial malleolus fracture, stable avulsion posterior malleolus fracture, RIGHT syndesmosis disruption   Complications: none  Implants:   Implant Name Type Inv.  Item Serial No.  Lot No. LRB No. Used Action   GRAFT BNE [employer] --  - [id number]  G[employer] --  [id number] MUSCULOSKELETAL TRANS medium Right 1 Implanted   GRAFT BNE [employer] LG --  - [id number]  GRAFT BNE [employer] LG --  [id number] MUSCULOSKELETAL TRANS  Right 1 Implanted   FIBULA PLATE   NA [employer] NA Right 1 Implanted   4.0X48MM CANNULATED SCREW   NA [employer] NA Right 2 Implanted   SCR BNE RELL NLCK LP 3.5X16MM -- ORTHOLOC HALLUX - SNA  SCR BNE RELL NLCK LP 3.5X16MM -- ORTHOLOC HALLUX NA [employer]CHN[employer] NA Right 1 Implanted   SCR BNE RELL NLCK LP 3.5X18MM -- ORTHOLOC HALLUX - SNA  SCR BNE RELL NLCK LP 3.5X18MM -- ORTHOLOC HALLUX NA [employer] TECHNOLOGY NA Right 2 Implanted   SCR BNE RELL NLCK LP 3.5X12MM -- ORTHOLOC HALLUX - SNA  SCR BNE RELL NLCK LP 3.5X12MM -- ORTHOLOC HALLUX NA [employer] NA Right 4 Implanted   3.5X18MM SCREW   NA [employer] NA Right 1 Implanted   4.0X50MM SCREW   NA [employer] [facility] NA Right 1 Implanted   4.0X45MM SCREW   NA [employer] [facility] NA Right 2 Implanted   4.0X40MM SCREW     NA [employer]  Right 1 Implanted       Dictation #: [id number]

## 2018-03-13 NOTE — PROGRESS NOTES
Daily Progress Note: 3/13/2018  Jose De Jesus Merida MD    Assessment/Plan:   MARYJO on admit - resolved     Hypokalemia - resolved     HTN (hypertension), benign (3/3/2018): BP improved on current regimen     Depression with anxiety- Cymbalta     Dyslipidemia -  simvastatin      Ankle fracture, right Per ortho. --Surgery planned for tomorrow 3/12    Alcohol withdrawal as evidenced by confusion & agitation treated with IV Ativan 2mg and placed on CIWA protocol - resolved    Hx breast CA - continue femara    DVT proph - heparin subcu        Problem List:  Problem List as of 3/13/2018  Date Reviewed: 8/24/2016          Codes Class Noted - Resolved    MARYJO (acute kidney injury) ([facility] [address] ICD-10-CM: N17.9  ICD-9-CM: 584.9  3/3/2018 - Present        Hypokalemia due to loss of potassium ICD-10-CM: E87.6  ICD-9-CM: 276.8  3/3/2018 - Present        HTN (hypertension), benign ICD-10-CM: I10  ICD-9-CM: 401.1  3/3/2018 - Present        Depression with anxiety ICD-10-CM: F41.8  ICD-9-CM: 300.4  3/3/2018 - Present        Dyslipidemia ICD-10-CM: E78.5  ICD-9-CM: 272.4  3/3/2018 - Present        * (Principal)Ankle fracture, right ICD-10-CM: V11.167E  ICD-9-CM: 824.8  3/2/2018 - Present        Numbness and tingling of both legs below knees ICD-10-CM: R20.0, R20.2  ICD-9-CM: 782.0  11/30/2015 - Present        Numbness in feet ICD-10-CM: R20.0  ICD-9-CM: 782.0  10/23/2015 - Present        Burning sensation of feet ICD-10-CM: R20.8  ICD-9-CM: 782.0  10/23/2015 - Present        SAH (subarachnoid hemorrhage) (HCC) ICD-10-CM: I60.9  ICD-9-CM: 489  10/23/2015 - Present              Subjective:    62 yo WF, pt Dr Kavya Hernandez in our group, admitted after fall and severe fx of right ankle requiring 2 stage surg repair, first repair done 3/2/18. Pt reports no syncope or dizziness prior to fall and no prodrome events. She did walk on the foot for 2 days post fall and had little pain due to her chronic LE neuropathy.   We are asked to see her for her medical problems including elevated creat. She does report marked decrease in intake for 2 days since her fall. She has a hx of HTN which has been relatively well controlled outpt. She also has a hx hypothyroidism, LE neuropathy, depression with anxiety and hypothyroidism and reports she is compliant with her medications. In addition, she reports Alcoholism and reports she has been sober for Ramonia Salvia couple of months. \"  She is going to call her sponsor today as she is feeling additional stress from the fall and surg. Currently she is stable for follow up surg. 3/4: Had more agitation and confusion last night and had to start CIWA protocol. She is better this am but still having problems with word retrieval. She reports to me that her last drink was Monday but told Dr Wing Powell it was months ago. Potassium still low at 2.9. Will replete with po.     3/5:  Agitation and confusion have resolved. K+ back up after repletion. Discussed with ortho. 3/6:  Feeling better this AM.  BP spiked last night and Amlodipine added and resumed her usual BP meds - BP now better. No further signs of ETOH withdrawal.      3/7: BP is much better. She slept well last night. No signs of withdrawal. Case management working on placement until her surgery. She is not safe to go home. Potassium a little low. 3/8:  No new complaints. K+ back up. BP ok. Medical problems stable. 3/9:  Little pain at this time. K+ ok. BP ok. Jean Saldivar discussion held about her ETOH and she is resolved to stop ETOH. 3/10: No acute complaints. Anxious about surgery. Denies CP, SOB. Pain in foot controlled. Tolerating po. +BM. 3/11: No acute events overnight. Notes that swelling in foot has diminished. 3/12:  No complaints except discomfort from right leg. Stable for surg later today. 3/13:  Surg yesterday PM - pt in cast today with little pain currently.   Glucose elevated on this AMs lab but bedside fingerstick glucose OK 1 hour post prandial (150) - check HbA1C. Past Medical History:   Diagnosis Date    Alcohol abuse     Anxiety     Depression     Fatigue     Hypertension     Memory loss     Vertigo     Visual disturbance      Past Surgical History:   Procedure Laterality Date    HX ANKLE FRACTURE TX Right 12/2016    HX APPENDECTOMY      HX ORTHOPAEDIC      right knee repair    HX OTHER SURGICAL  04/2016    double mastectomy     HX OTHER SURGICAL  06/2016    reconstructive surgery on her breasts    HX SHOULDER REPLACEMENT Right 11/2016    HX WRIST FRACTURE TX Left 12/2016     Social History     Social History    Marital status:      Spouse name: N/A    Number of children: N/A    Years of education: N/A     Occupational History    Not on file. Social History Main Topics    Smoking status: Never Smoker    Smokeless tobacco: Never Used    Alcohol use 21.0 oz/week     35 Glasses of wine per week      Comment: last drink was 2/27    Drug use: Not on file    Sexual activity: Not on file     Other Topics Concern    Not on file     Social History Narrative     Family History   Problem Relation Age of Onset    Cancer Mother     Other Father      Allergies   Allergen Reactions    Sulfa (Sulfonamide Antibiotics) Swelling and Other (comments)     Flushing and hot feeling   Facial swelling     Patient Active Problem List   Diagnosis Code    Numbness in feet R20.0    Burning sensation of feet R20.8    SAH (subarachnoid hemorrhage) ([facility]) I60.9    Numbness and tingling of both legs below knees R20.0, R20.2    Ankle fracture, right S82.891A    MARYJO (acute kidney injury) ([facility] [address] N17.9    Hypokalemia due to loss of potassium E87.6    HTN (hypertension), benign I10    Depression with anxiety F41.8    Dyslipidemia E78.5     Review of Systems:   Neg 12 system ROS other than what is mentioned in subjective.     Objective:   Physical Exam:     Visit Vitals    /59 (BP 1 Location: Left arm, BP Patient Position: At rest)    Pulse (!) 102    Temp 98.1 °F (36.7 °C)    Resp 16    Ht 5' 7\" (1.702 m)    Wt 69.9 kg (154 lb)    SpO2 94%    Breastfeeding No    BMI 24.12 kg/m2    O2 Flow Rate (L/min): 2 l/min O2 Device: Nasal cannula    Temp (24hrs), Av.7 °F (37.1 °C), Min:98.1 °F (36.7 °C), Max:99.2 °F (37.3 °C)         1901 -  0700  In: 1800 [I.V.:1800]  Out: 2725 [Urine:2700]    General:  Alert, cooperative, no distress, appears stated age. Head:  Normocephalic, without obvious abnormality, atraumatic. Eyes:  Conjunctivae/corneas clear. PERRL, EOMs intact. Throat: Lips, mucosa, and tongue moist..   Neck: Supple, symmetrical   Lungs:   Clear to auscultation bilaterally. Heart:  Regular rate and rhythm, S1, S2 normal, no murmur, click, rub or gallop. Abdomen:   Soft, non-tender. Bowel sounds normal. No masses,  No organomegaly. Extremities: no cyanosis or edema. Right LE in cast with bone stimulator inplace. Toes mobile to command. Feet warm. Dressing dry. Skin: Skin color, texture, turgor normal. No rashes or lesions   Neurologic: CNII-XII intact. Alert and oriented X 3. Data Review:   EXAM:  XR TIB/FIB RT, XR ANKLE RT MIN 3 V 3/2/18  INDICATION:  fx.  Right ankle deformity after a fall  COMPARISON: None. FINDINGS: AP and lateral  views of the right tibia and fibula. 3 views of the  right ankle. The patient is status post ORIF of the patella. There is mild medial compartment  osteophytosis in the knee. There is chronic deformity of the fibular neck. There  is a displaced fracture of the medial malleolus. The medial malleolus is 1.5 cm  from the distal tibia. There is a comminuted displaced fracture of the lateral  malleolus at the level of the tibiotalar joint. The talus and distal lateral  malleolus are dislocated laterally by 2.8 cm and slightly retracted.   IMPRESSION: Bimalleolar ankle fracture dislocation    Recent Days:  Recent Labs 03/13/18 0135 03/12/18   0205   WBC  10.2  7.8   HGB  10.5*  10.6*   HCT  32.8*  31.9*   PLT  349  300     Recent Labs      03/13/18 0135 03/12/18   0205   NA  136  136   K  4.2  3.6   CL  99  100   CO2  27  29   GLU  305*  104*   BUN  13  18   CREA  1.18*  0.92   CA  8.6  9.4   MG  1.6   --    ALB  2.8*  3.1*   TBILI  0.2  0.3   SGOT  20  15   ALT  6*  15     No results for input(s): PH, PCO2, PO2, HCO3, FIO2 in the last 72 hours. 24 Hour Results:  Recent Results (from the past 24 hour(s))   METABOLIC PANEL, COMPREHENSIVE    Collection Time: 03/13/18  1:35 AM   Result Value Ref Range    Sodium 136 136 - 145 mmol/L    Potassium 4.2 3.5 - 5.1 mmol/L    Chloride 99 97 - 108 mmol/L    CO2 27 21 - 32 mmol/L    Anion gap 10 5 - 15 mmol/L    Glucose 305 (H) 65 - 100 mg/dL    BUN 13 6 - 20 MG/DL    Creatinine 1.18 (H) 0.55 - 1.02 MG/DL    BUN/Creatinine ratio 11 (L) 12 - 20      GFR est AA 56 (L) >60 ml/min/1.73m2    GFR est non-AA 46 (L) >60 ml/min/1.73m2    Calcium 8.6 8.5 - 10.1 MG/DL    Bilirubin, total 0.2 0.2 - 1.0 MG/DL    ALT (SGPT) 6 (L) 12 - 78 U/L    AST (SGOT) 20 15 - 37 U/L    Alk.  phosphatase 75 45 - 117 U/L    Protein, total 6.5 6.4 - 8.2 g/dL    Albumin 2.8 (L) 3.5 - 5.0 g/dL    Globulin 3.7 2.0 - 4.0 g/dL    A-G Ratio 0.8 (L) 1.1 - 2.2     MAGNESIUM    Collection Time: 03/13/18  1:35 AM   Result Value Ref Range    Magnesium 1.6 1.6 - 2.4 mg/dL   CBC WITH AUTOMATED DIFF    Collection Time: 03/13/18  1:35 AM   Result Value Ref Range    WBC 10.2 3.6 - 11.0 K/uL    RBC 3.39 (L) 3.80 - 5.20 M/uL    HGB 10.5 (L) 11.5 - 16.0 g/dL    HCT 32.8 (L) 35.0 - 47.0 %    MCV 96.8 80.0 - 99.0 FL    MCH 31.0 26.0 - 34.0 PG    MCHC 32.0 30.0 - 36.5 g/dL    RDW 14.0 11.5 - 14.5 %    PLATELET 719 292 - 213 K/uL    MPV 9.6 8.9 - 12.9 FL    NRBC 0.0 0  WBC    ABSOLUTE NRBC 0.00 0.00 - 0.01 K/uL    NEUTROPHILS 94 (H) 32 - 75 %    LYMPHOCYTES 5 (L) 12 - 49 %    MONOCYTES 0 (L) 5 - 13 %    EOSINOPHILS 1 0 - 7 % BASOPHILS 0 0 - 1 %    IMMATURE GRANULOCYTES 0 %    ABS. NEUTROPHILS 9.6 (H) 1.8 - 8.0 K/UL    ABS. LYMPHOCYTES 0.5 (L) 0.8 - 3.5 K/UL    ABS. MONOCYTES 0.0 0.0 - 1.0 K/UL    ABS. EOSINOPHILS 0.1 0.0 - 0.4 K/UL    ABS. BASOPHILS 0.0 0.0 - 0.1 K/UL    ABS. IMM.  GRANS. 0.0 K/UL    DF MANUAL      RBC COMMENTS NORMOCYTIC, NORMOCHROMIC       Medications reviewed  Current Facility-Administered Medications   Medication Dose Route Frequency    0.9% sodium chloride infusion  125 mL/hr IntraVENous CONTINUOUS    sodium chloride (NS) flush 5-10 mL  5-10 mL IntraVENous Q8H    sodium chloride (NS) flush 5-10 mL  5-10 mL IntraVENous PRN    naloxone (NARCAN) injection 0.4 mg  0.4 mg IntraVENous PRN    calcium-vitamin D (OS-AKILAH) 500 mg-200 unit tablet  1 Tab Oral TID WITH MEALS    senna-docusate (PERICOLACE) 8.6-50 mg per tablet 1 Tab  1 Tab Oral BID    polyethylene glycol (MIRALAX) packet 17 g  17 g Oral DAILY    [START ON 3/14/2018] bisacodyl (DULCOLAX) suppository 10 mg  10 mg Rectal DAILY PRN    morphine injection 1 mg  1 mg IntraVENous Q4H PRN    enoxaparin (LOVENOX) injection 40 mg  40 mg SubCUTAneous DAILY    ceFAZolin (ANCEF) 2 g/20 mL in sterile water IV syringe  2 g IntraVENous Q8H    HYDROcodone-acetaminophen (NORCO) 5-325 mg per tablet 1 Tab  1 Tab Oral Q6H PRN    letrozole (FEMARA) tablet 2.5 mg  2.5 mg Oral DAILY    cephALEXin (KEFLEX) capsule 500 mg  500 mg Oral Q8H    gabapentin (NEURONTIN) capsule 600 mg  600 mg Oral TID    diphenhydrAMINE (BENADRYL) capsule 25 mg  25 mg Oral Q6H PRN    diphenhydrAMINE (BENADRYL) injection 25 mg  25 mg IntraVENous Q6H PRN    traZODone (DESYREL) tablet 50 mg  50 mg Oral QHS PRN    traZODone (DESYREL) tablet 50 mg  50 mg Oral QHS PRN    hydroCHLOROthiazide (HYDRODIURIL) tablet 25 mg  25 mg Oral DAILY    losartan (COZAAR) tablet 50 mg  50 mg Oral DAILY    amLODIPine (NORVASC) tablet 10 mg  10 mg Oral DAILY    hydrALAZINE (APRESOLINE) 20 mg/mL injection 20 mg  20 mg IntraVENous Q4H PRN    prochlorperazine (COMPAZINE) tablet 10 mg  10 mg Oral Q6H PRN    therapeutic multivitamin (THERAGRAN) tablet 1 Tab  1 Tab Oral DAILY    carvedilol (COREG) tablet 12.5 mg  12.5 mg Oral BID WITH MEALS    DULoxetine (CYMBALTA) capsule 20 mg  20 mg Oral DAILY    levothyroxine (SYNTHROID) tablet 50 mcg  50 mcg Oral ACB    simvastatin (ZOCOR) tablet 20 mg  20 mg Oral DAILY    sodium chloride (NS) flush 5-10 mL  5-10 mL IntraVENous Q8H    sodium chloride (NS) flush 5-10 mL  5-10 mL IntraVENous PRN    acetaminophen (TYLENOL) tablet 650 mg  650 mg Oral Q4H PRN    naloxone (NARCAN) injection 0.4 mg  0.4 mg IntraVENous PRN    folic acid (FOLVITE) tablet 1 mg  1 mg Oral DAILY    Thiamine Mononitrate (B-1) tablet 100 mg  100 mg Oral DAILY    LORazepam (ATIVAN) injection 1 mg  1 mg IntraVENous Q6H PRN    prochlorperazine (COMPAZINE) injection 5 mg  5 mg IntraVENous Q6H PRN       María Manley MD

## 2018-03-14 VITALS
RESPIRATION RATE: 16 BRPM | OXYGEN SATURATION: 92 % | DIASTOLIC BLOOD PRESSURE: 74 MMHG | SYSTOLIC BLOOD PRESSURE: 113 MMHG | HEART RATE: 87 BPM | WEIGHT: 154 LBS | BODY MASS INDEX: 24.17 KG/M2 | TEMPERATURE: 98.4 F | HEIGHT: 67 IN

## 2018-03-14 PROCEDURE — 74011250637 HC RX REV CODE- 250/637: Performed by: FAMILY MEDICINE

## 2018-03-14 PROCEDURE — 74011250637 HC RX REV CODE- 250/637: Performed by: NURSE PRACTITIONER

## 2018-03-14 PROCEDURE — 74011250637 HC RX REV CODE- 250/637: Performed by: PHYSICIAN ASSISTANT

## 2018-03-14 PROCEDURE — 97110 THERAPEUTIC EXERCISES: CPT

## 2018-03-14 PROCEDURE — 74011250637 HC RX REV CODE- 250/637: Performed by: ORTHOPAEDIC SURGERY

## 2018-03-14 PROCEDURE — 97535 SELF CARE MNGMENT TRAINING: CPT

## 2018-03-14 PROCEDURE — 74011250636 HC RX REV CODE- 250/636: Performed by: FAMILY MEDICINE

## 2018-03-14 PROCEDURE — 74011250636 HC RX REV CODE- 250/636: Performed by: ORTHOPAEDIC SURGERY

## 2018-03-14 RX ORDER — AMLODIPINE BESYLATE 10 MG/1
10 TABLET ORAL DAILY
Qty: 30 TAB | Refills: 0 | Status: SHIPPED | OUTPATIENT
Start: 2018-03-14

## 2018-03-14 RX ORDER — POLYETHYLENE GLYCOL 3350 17 G/17G
17 POWDER, FOR SOLUTION ORAL DAILY
Qty: 30 PACKET | Refills: 0 | Status: SHIPPED | OUTPATIENT
Start: 2018-03-14

## 2018-03-14 RX ORDER — FOLIC ACID 1 MG/1
1 TABLET ORAL DAILY
Qty: 30 TAB | Refills: 0 | Status: SHIPPED | OUTPATIENT
Start: 2018-03-14

## 2018-03-14 RX ORDER — OXYCODONE AND ACETAMINOPHEN 5; 325 MG/1; MG/1
1 TABLET ORAL
Qty: 30 TAB | Refills: 0 | Status: SHIPPED | OUTPATIENT
Start: 2018-03-14

## 2018-03-14 RX ADMIN — GABAPENTIN 600 MG: 300 CAPSULE ORAL at 08:07

## 2018-03-14 RX ADMIN — Medication 100 MG: at 08:07

## 2018-03-14 RX ADMIN — THERA TABS 1 TABLET: TAB at 08:08

## 2018-03-14 RX ADMIN — POLYETHYLENE GLYCOL 3350 17 G: 17 POWDER, FOR SOLUTION ORAL at 08:08

## 2018-03-14 RX ADMIN — OXYCODONE AND ACETAMINOPHEN 1 TABLET: 10; 325 TABLET ORAL at 04:51

## 2018-03-14 RX ADMIN — AMLODIPINE BESYLATE 10 MG: 5 TABLET ORAL at 08:07

## 2018-03-14 RX ADMIN — LEVOTHYROXINE SODIUM 50 MCG: 50 TABLET ORAL at 04:51

## 2018-03-14 RX ADMIN — CEPHALEXIN 500 MG: 250 CAPSULE ORAL at 04:50

## 2018-03-14 RX ADMIN — LETROZOLE 2.5 MG: 2.5 TABLET, FILM COATED ORAL at 08:08

## 2018-03-14 RX ADMIN — OYSTER SHELL CALCIUM WITH VITAMIN D 1 TABLET: 500; 200 TABLET, FILM COATED ORAL at 12:41

## 2018-03-14 RX ADMIN — HYDROCHLOROTHIAZIDE 25 MG: 25 TABLET ORAL at 08:08

## 2018-03-14 RX ADMIN — DOCUSATE SODIUM AND SENNOSIDES 1 TABLET: 8.6; 5 TABLET, FILM COATED ORAL at 08:08

## 2018-03-14 RX ADMIN — OXYCODONE AND ACETAMINOPHEN 1 TABLET: 10; 325 TABLET ORAL at 12:41

## 2018-03-14 RX ADMIN — LOSARTAN POTASSIUM 50 MG: 50 TABLET ORAL at 08:07

## 2018-03-14 RX ADMIN — DULOXETINE HYDROCHLORIDE 20 MG: 20 CAPSULE, DELAYED RELEASE ORAL at 08:07

## 2018-03-14 RX ADMIN — OYSTER SHELL CALCIUM WITH VITAMIN D 1 TABLET: 500; 200 TABLET, FILM COATED ORAL at 08:08

## 2018-03-14 RX ADMIN — FOLIC ACID 1 MG: 1 TABLET ORAL at 08:08

## 2018-03-14 RX ADMIN — OXYCODONE AND ACETAMINOPHEN 1 TABLET: 10; 325 TABLET ORAL at 00:18

## 2018-03-14 RX ADMIN — OXYCODONE AND ACETAMINOPHEN 1 TABLET: 10; 325 TABLET ORAL at 08:58

## 2018-03-14 RX ADMIN — ENOXAPARIN SODIUM 40 MG: 40 INJECTION SUBCUTANEOUS at 08:08

## 2018-03-14 RX ADMIN — CARVEDILOL 12.5 MG: 12.5 TABLET, FILM COATED ORAL at 08:08

## 2018-03-14 NOTE — PROGRESS NOTES
Problem: Pain  Goal: *Control of Pain  Outcome: Progressing Towards Goal  Pt pain controled with po percocet  q 4   Pt pain goal not met at start of shift, notified PA, meds changed, and admin per order, pt currently resting, will cont to monitor.

## 2018-03-14 NOTE — DISCHARGE INSTRUCTIONS
Patient Discharge Instructions    Geovanni Kumar / [id number] : 1955    Admitted 3/2/2018 Discharged: 3/14/2018 7:26 AM     ACUTE DIAGNOSES:  RIGHT ANKLE FRACTURE  Ankle fracture, right  ANKLE FRACTURE    CHRONIC MEDICAL DIAGNOSES:  Problem List as of 3/14/2018  Date Reviewed: 2016          Codes Class Noted - Resolved    MARYJO (acute kidney injury) ([facility] ICD-10-CM: N17.9  ICD-9-CM: 584.9  3/3/2018 - Present        Hypokalemia due to loss of potassium ICD-10-CM: E87.6  ICD-9-CM: 276.8  3/3/2018 - Present        HTN (hypertension), benign ICD-10-CM: I10  ICD-9-CM: 401.1  3/3/2018 - Present        Depression with anxiety ICD-10-CM: F41.8  ICD-9-CM: 300.4  3/3/2018 - Present        Dyslipidemia ICD-10-CM: E78.5  ICD-9-CM: 272.4  3/3/2018 - Present        * (Principal)Ankle fracture, right ICD-10-CM: D77.157W  ICD-9-CM: 824.8  3/2/2018 - Present        Numbness and tingling of both legs below knees ICD-10-CM: R20.0, R20.2  ICD-9-CM: 782.0  2015 - Present        Numbness in feet ICD-10-CM: R20.0  ICD-9-CM: 782.0  10/23/2015 - Present        Burning sensation of feet ICD-10-CM: R20.8  ICD-9-CM: 782.0  10/23/2015 - Present        SAH (subarachnoid hemorrhage) ([facility] ICD-10-CM: I60.9  ICD-9-CM: 325  10/23/2015 - Present              DISCHARGE MEDICATIONS:         · It is important that you take the medication exactly as they are prescribed. · Keep your medication in the bottles provided by the pharmacist and keep a list of the medication names, dosages, and times to be taken in your wallet. · Do not take other medications without consulting your doctor.        DIET:  Regular Diet    ACTIVITY: Activity as tolerated    ADDITIONAL INFORMATION: If you experience any of the following symptoms then please call your primary care physician or return to the emergency room if you cannot get hold of your doctor: Fever, chills, nausea, vomiting, diarrhea, change in mentation, falling, bleeding, shortness of breath. FOLLOW UP CARE:  Dr. Bin Jones MD  you are to call and set up an appointment to see them with in 1 week. Follow-up with specialists at directed by them      Information obtained by :  I understand that if any problems occur once I am at home I am to contact my physician. I understand and acknowledge receipt of the instructions indicated above. Physician's or R.N.'s Signature                                                                  Date/Time                                                                                                                                              Patient or Representative Signature                                                          Date/Time      Alyson Holcomb MD  Foot and Ankle Surgery  General Orthopaedics      POST-OP Instructions      BLOOD CLOTS: To prevent blood clot formation, you have been prescribed aspirin 81mg tablets to be taken by mouth once a day for 6 week. PAIN CONTROL: For pain control, you have been prescribed narcotic    medication (Oxycodone or Norco). Take as prescribed and wean as able. VERY IMPORTANT - PLEASE READ. If you were given a nerve block by the anesthesia team to numb your leg, you must start taking pain medication BEFORE the block wears off EVEN IF YOU ARE NOT HAVING PAIN. If you do not start taking pain medication before the block wears off, you will be behind on pain control. The block will usually wear off in 12-24 hours after surgery, so you must have pain medication in your system before the block wears off. Take pain medication with food if possible to minimize stomach irritation.  You may    take tylenol (acetaminophen) with Oxycodone but not with medications that    already have acetaminophen in them such as Norco. While taking narcotics, you    may have constipation. A stool softener is recommended-- you may use an over-    the-counter stool softener or use the one that has been prescribed for you. We    recommend no ibuprofen (Advil, Motrin, etc) since there are some studies that    show it may interfere with bone healing. Do not drink alcohol or drive while using    narcotic pain medication. VERY IMPORTANT - PLEASE READ Please monitor the supply of your pain medicine closely and if it looks like you're going to run out of pain medicine over the weekend please call the office on [address] prior to the weekend to request a refill. The on call physician for nights and weekends CANNOT refill pain medicine. You will either have to wait until Monday morning when the office re-opens or you will have to go to an urgent care/ emergency room if you are in need of pain medicine. NAUSEA/VOMITING: Sometimes after surgery, patients have nausea or    vomiting. A medicine has been prescribed for this. If you do not have nausea or    vomiting, you do not need to have this prescription filled. HOME MEDICATIONS: Resume medications you were taking prior to surgery    unless you have been counseled to discontinue them. POST-OPERATIVE INSTRUCTIONS:    ACTIVITY: Please elevate your operative extremity above the level of your heart    for the first 4 days as much as possible after surgery (a good way to remember    this is (toes above nose). Moving around and walking (with crutches) helps    decrease the risk of blood clots. While you are sleeping and when you are not    being active, continue to keep your leg elevated as much as possible. It is    common to have swelling in your feet after surgery for even a year afterwards, so    the more you keep the leg elevated after surgery, the less swelling you will have    later on. WEIGHTBEARING: You are to be NON- weight-bearing to your operative leg.     GENERAL INSTRUCTIONS:    1. Be alert for signs of infection including redness, streaking, odor,    fever or chills. Be alert for excessive pain or bleeding and notify    your surgeon immediately. 2. Notify Provider of nausea, vomiting, or chills, numbness or weakness,    bleeding, redness, swelling, pain, or drainage from surgical incision(s), bowel or    bladder dysfunction, severe pain not relieved by pain medications, temperature    greater than 101.0 F (38.3 C) degrees    3. If you smoke (or have smoked within the last year), we strongly recommend    that you do not smoke. DIET AND COMFORT: Return to your regular diet as tolerated. Begin with light    or bland foods. Drink plenty of fluids. DRESSING CARE:    1. Leave your dressing/cast/splint in place until your post operative visit. Keep it    clean and dry. Mild to moderate blood or drainage after surgery is expected. 2. Keep your operative extremity elevated. Avoid pressure under the heel by    placing pillows under your calf, not your foot. 3. Use bone stimulator 3 hours per day. FOLLOWUP INSTRUCTIONS:    1. Follow up in clinic with your surgeon within 2-3 weeks. If your appointment has    not already been made at the time of discharge, you will need to call to make the    appointment. 2. The general scheduling number is [phone]    3. Contact your physicians office during office hours. For medical emergencies    call [phone].

## 2018-03-14 NOTE — PROGRESS NOTES
Subjective:    George Severin is a 61 y.o. female who is POD 2 s/p ORIF RIGHT ankle fx    Major Events:. Pain controlled    Objective:    Vital signs in last 24 hours:    Temp:  [97.6 °F (36.4 °C)-99.2 °F (37.3 °C)]   Pulse (Heart Rate):  []   BP: ()/(53-98)   Resp Rate:  [10-20]   O2 Sat (%):  [88 %-100 %]   Weight:  [69.9 kg (154 lb)]     Temp (24hrs), Av.2 °F (36.8 °C), Min:97.9 °F (36.6 °C), Max:98.8 °F (37.1 °C)      Labs:    Lab Results   Component Value Date/Time    WBC 10.2 2018 01:35 AM    HGB 10.5 (L) 2018 01:35 AM    HCT 32.8 (L) 2018 01:35 AM    PLATELET 164  01:35 AM       Lab Results   Component Value Date/Time    Sodium 136 2018 01:35 AM    Potassium 4.2 2018 01:35 AM    Chloride 99 2018 01:35 AM    CO2 27 2018 01:35 AM    BUN 13 2018 01:35 AM       Physical Exam:    General: alert, cooperative, in NAD  Nonlabored resp    RIGHT Lower extremity    Dressing: splint c/d/i, elevated      Motor: + toe df/pf    Sensory: diminished at baseline    Vascular: Brisk capillary refill in toes    Assessment/Plan:    · Pain management: as written    · DVT Prophylaxis: lovenox 40mg SC daily in house, can transition to Xarelto 10mg PO daily x 4 weeks, then transition to Aspirin 81mg PO BID x 2 weeks    · PT/OT: NWB RLE     · Dispo: pending PT evaluation    F/u: [facility] Office 2-3 weeks [address].  David Anand MD

## 2018-03-14 NOTE — PROGRESS NOTES
Daily Progress Note: 3/14/2018  Shaniqua Pineda MD    Assessment/Plan:   MARYJO on admit - resolved     Hypokalemia - resolved     HTN (hypertension), benign (3/3/2018): BP improved on current regimen     Depression with anxiety- Cymbalta     Dyslipidemia -  simvastatin      Ankle fracture, right Per ortho. --Surgery  3/12    Alcohol withdrawal as evidenced by confusion & agitation treated with IV Ativan 2mg and placed on CIWA protocol - resolved    Hx breast CA - continue femara    DVT proph - heparin subcu        Problem List:  Problem List as of 3/14/2018  Date Reviewed: 8/24/2016          Codes Class Noted - Resolved    MARYJO (acute kidney injury) ([facility] [address] ICD-10-CM: N17.9  ICD-9-CM: 584.9  3/3/2018 - Present        Hypokalemia due to loss of potassium ICD-10-CM: E87.6  ICD-9-CM: 276.8  3/3/2018 - Present        HTN (hypertension), benign ICD-10-CM: I10  ICD-9-CM: 401.1  3/3/2018 - Present        Depression with anxiety ICD-10-CM: F41.8  ICD-9-CM: 300.4  3/3/2018 - Present        Dyslipidemia ICD-10-CM: E78.5  ICD-9-CM: 272.4  3/3/2018 - Present        * (Principal)Ankle fracture, right ICD-10-CM: N48.033S  ICD-9-CM: 824.8  3/2/2018 - Present        Numbness and tingling of both legs below knees ICD-10-CM: R20.0, R20.2  ICD-9-CM: 782.0  11/30/2015 - Present        Numbness in feet ICD-10-CM: R20.0  ICD-9-CM: 782.0  10/23/2015 - Present        Burning sensation of feet ICD-10-CM: R20.8  ICD-9-CM: 782.0  10/23/2015 - Present        SAH (subarachnoid hemorrhage) (HCC) ICD-10-CM: I60.9  ICD-9-CM: 142  10/23/2015 - Present              Subjective:    60 yo WF, pt Dr Boone Caceres in our group, admitted after fall and severe fx of right ankle requiring 2 stage surg repair, first repair done 3/2/18. Pt reports no syncope or dizziness prior to fall and no prodrome events. She did walk on the foot for 2 days post fall and had little pain due to her chronic LE neuropathy.   We are asked to see her for her medical problems including elevated creat. She does report marked decrease in intake for 2 days since her fall. She has a hx of HTN which has been relatively well controlled outpt. She also has a hx hypothyroidism, LE neuropathy, depression with anxiety and hypothyroidism and reports she is compliant with her medications. In addition, she reports Alcoholism and reports she has been sober for Clarine Brick couple of months. \"  She is going to call her sponsor today as she is feeling additional stress from the fall and surg. Currently she is stable for follow up surg. 3/4: Had more agitation and confusion last night and had to start CIWA protocol. She is better this am but still having problems with word retrieval. She reports to me that her last drink was Monday but told Dr Avila Munoz it was months ago. Potassium still low at 2.9. Will replete with po.     3/5:  Agitation and confusion have resolved. K+ back up after repletion. Discussed with ortho. 3/6:  Feeling better this AM.  BP spiked last night and Amlodipine added and resumed her usual BP meds - BP now better. No further signs of ETOH withdrawal.      3/7: BP is much better. She slept well last night. No signs of withdrawal. Case management working on placement until her surgery. She is not safe to go home. Potassium a little low. 3/8:  No new complaints. K+ back up. BP ok. Medical problems stable. 3/9:  Little pain at this time. K+ ok. BP ok. Amanda Barclay discussion held about her ETOH and she is resolved to stop ETOH. 3/10: No acute complaints. Anxious about surgery. Denies CP, SOB. Pain in foot controlled. Tolerating po. +BM. 3/11: No acute events overnight. Notes that swelling in foot has diminished. 3/12:  No complaints except discomfort from right leg. Stable for surg later today. 3/13:  Surg yesterday PM - pt in cast today with little pain currently.   Glucose elevated on this AMs lab but bedside fingerstick glucose OK 1 hour post prandial (150) - check HbA1C.    3/14: Feeling much better. She has home equipment set up and is ready to go home. Understands importance of follow up. Has care set up at home. Has been speaking with her sponsor regarding her alcoholism. Past Medical History:   Diagnosis Date    Alcohol abuse     Anxiety     Depression     Fatigue     Hypertension     Memory loss     Vertigo     Visual disturbance      Past Surgical History:   Procedure Laterality Date    HX ANKLE FRACTURE TX Right 12/2016    HX APPENDECTOMY      HX ORTHOPAEDIC      right knee repair    HX OTHER SURGICAL  04/2016    double mastectomy     HX OTHER SURGICAL  06/2016    reconstructive surgery on her breasts    HX SHOULDER REPLACEMENT Right 11/2016    HX WRIST FRACTURE TX Left 12/2016     Social History     Social History    Marital status:      Spouse name: N/A    Number of children: N/A    Years of education: N/A     Occupational History    Not on file.      Social History Main Topics    Smoking status: Never Smoker    Smokeless tobacco: Never Used    Alcohol use 21.0 oz/week     35 Glasses of wine per week      Comment: last drink was 2/27    Drug use: Not on file    Sexual activity: Not on file     Other Topics Concern    Not on file     Social History Narrative     Family History   Problem Relation Age of Onset    Cancer Mother     Other Father      Allergies   Allergen Reactions    Sulfa (Sulfonamide Antibiotics) Swelling and Other (comments)     Flushing and hot feeling   Facial swelling     Patient Active Problem List   Diagnosis Code    Numbness in feet R20.0    Burning sensation of feet R20.8    SAH (subarachnoid hemorrhage) ([facility]) I60.9    Numbness and tingling of both legs below knees R20.0, R20.2    Ankle fracture, right S82.891A    MARYJO (acute kidney injury) ([facility] [address] N17.9    Hypokalemia due to loss of potassium E87.6    HTN (hypertension), benign I10    Depression with anxiety F41.8    Dyslipidemia E78.5     Review of Systems:   Neg 12 system ROS other than what is mentioned in subjective. Objective:   Physical Exam:     Visit Vitals    /74 (BP 1 Location: Right arm, BP Patient Position: Head of bed elevated (Comment degrees))    Pulse 82    Temp 98.8 °F (37.1 °C)    Resp 15    Ht 5' 7\" (1.702 m)    Wt 154 lb (69.9 kg)    SpO2 93%    Breastfeeding No    BMI 24.12 kg/m2    O2 Flow Rate (L/min): 2 l/min O2 Device: Room air    Temp (24hrs), Av.2 °F (36.8 °C), Min:97.9 °F (36.6 °C), Max:98.8 °F (37.1 °C)        1901 -  0700  In: 2040 [P.O.:240; I.V.:1800]  Out: 6219 [Urine:1350]    General:  Alert, cooperative, no distress, appears stated age. Head:  Normocephalic, without obvious abnormality, atraumatic. Eyes:  Conjunctivae/corneas clear. PERRL, EOMs intact. Throat: Lips, mucosa, and tongue moist..   Neck: Supple, symmetrical   Lungs:   Clear to auscultation bilaterally. Heart:  Regular rate and rhythm, S1, S2 normal, no murmur, click, rub or gallop. Abdomen:   Soft, non-tender. Bowel sounds normal. No masses,  No organomegaly. Extremities: no cyanosis or edema. Right LE in cast with bone stimulator inplace. Toes mobile to command. Feet warm. Dressing dry. Skin: Skin color, texture, turgor normal. No rashes or lesions   Neurologic: CNII-XII intact. Alert and oriented X 3. Data Review:   EXAM:  XR TIB/FIB RT, XR ANKLE RT MIN 3 V 3/2/18  INDICATION:  fx.  Right ankle deformity after a fall  COMPARISON: None. FINDINGS: AP and lateral  views of the right tibia and fibula. 3 views of the  right ankle. The patient is status post ORIF of the patella. There is mild medial compartment  osteophytosis in the knee. There is chronic deformity of the fibular neck. There  is a displaced fracture of the medial malleolus. The medial malleolus is 1.5 cm  from the distal tibia.  There is a comminuted displaced fracture of the lateral  malleolus at the level of the tibiotalar joint. The talus and distal lateral  malleolus are dislocated laterally by 2.8 cm and slightly retracted. IMPRESSION: Bimalleolar ankle fracture dislocation    Recent Days:  Recent Labs      03/13/18 0135 03/12/18 0205   WBC  10.2  7.8   HGB  10.5*  10.6*   HCT  32.8*  31.9*   PLT  349  300     Recent Labs      03/13/18 0135 03/12/18   0205   NA  136  136   K  4.2  3.6   CL  99  100   CO2  27  29   GLU  305*  104*   BUN  13 18   CREA  1.18*  0.92   CA  8.6  9.4   MG  1.6   --    ALB  2.8*  3.1*   TBILI  0.2  0.3   SGOT  20  15   ALT  6*  15     No results for input(s): PH, PCO2, PO2, HCO3, FIO2 in the last 72 hours.     24 Hour Results:  Recent Results (from the past 24 hour(s))   GLUCOSE, POC    Collection Time: 03/13/18  8:25 AM   Result Value Ref Range    Glucose (POC) 152 (H) 65 - 100 mg/dL    Performed by Ting Andrade (PCT)    GLUCOSE, POC    Collection Time: 03/13/18 11:27 AM   Result Value Ref Range    Glucose (POC) 157 (H) 65 - 100 mg/dL    Performed by Ting Andrade (PCT)    HEMOGLOBIN A1C WITH EAG    Collection Time: 03/13/18  1:14 PM   Result Value Ref Range    Hemoglobin A1c 5.1 4.2 - 6.3 %    Est. average glucose 100 mg/dL     Medications reviewed  Current Facility-Administered Medications   Medication Dose Route Frequency    cephALEXin (KEFLEX) capsule 500 mg  500 mg Oral Q8H    oxyCODONE-acetaminophen (PERCOCET 10)  mg per tablet 1 Tab  1 Tab Oral Q4H PRN    oxyCODONE-acetaminophen (PERCOCET) 5-325 mg per tablet 1 Tab  1 Tab Oral Q4H PRN    sodium chloride (NS) flush 5-10 mL  5-10 mL IntraVENous Q8H    sodium chloride (NS) flush 5-10 mL  5-10 mL IntraVENous PRN    naloxone (NARCAN) injection 0.4 mg  0.4 mg IntraVENous PRN    calcium-vitamin D (OS-AKILAH) 500 mg-200 unit tablet  1 Tab Oral TID WITH MEALS    senna-docusate (PERICOLACE) 8.6-50 mg per tablet 1 Tab  1 Tab Oral BID    polyethylene glycol (MIRALAX) packet 17 g  17 g Oral DAILY    bisacodyl (DULCOLAX) suppository 10 mg  10 mg Rectal DAILY PRN    enoxaparin (LOVENOX) injection 40 mg  40 mg SubCUTAneous DAILY    letrozole (FEMARA) tablet 2.5 mg  2.5 mg Oral DAILY    gabapentin (NEURONTIN) capsule 600 mg  600 mg Oral TID    diphenhydrAMINE (BENADRYL) capsule 25 mg  25 mg Oral Q6H PRN    diphenhydrAMINE (BENADRYL) injection 25 mg  25 mg IntraVENous Q6H PRN    traZODone (DESYREL) tablet 50 mg  50 mg Oral QHS PRN    traZODone (DESYREL) tablet 50 mg  50 mg Oral QHS PRN    hydroCHLOROthiazide (HYDRODIURIL) tablet 25 mg  25 mg Oral DAILY    losartan (COZAAR) tablet 50 mg  50 mg Oral DAILY    amLODIPine (NORVASC) tablet 10 mg  10 mg Oral DAILY    hydrALAZINE (APRESOLINE) 20 mg/mL injection 20 mg  20 mg IntraVENous Q4H PRN    prochlorperazine (COMPAZINE) tablet 10 mg  10 mg Oral Q6H PRN    therapeutic multivitamin (THERAGRAN) tablet 1 Tab  1 Tab Oral DAILY    carvedilol (COREG) tablet 12.5 mg  12.5 mg Oral BID WITH MEALS    DULoxetine (CYMBALTA) capsule 20 mg  20 mg Oral DAILY    levothyroxine (SYNTHROID) tablet 50 mcg  50 mcg Oral ACB    simvastatin (ZOCOR) tablet 20 mg  20 mg Oral DAILY    sodium chloride (NS) flush 5-10 mL  5-10 mL IntraVENous Q8H    sodium chloride (NS) flush 5-10 mL  5-10 mL IntraVENous PRN    acetaminophen (TYLENOL) tablet 650 mg  650 mg Oral Q4H PRN    naloxone (NARCAN) injection 0.4 mg  0.4 mg IntraVENous PRN    folic acid (FOLVITE) tablet 1 mg  1 mg Oral DAILY    Thiamine Mononitrate (B-1) tablet 100 mg  100 mg Oral DAILY    LORazepam (ATIVAN) injection 1 mg  1 mg IntraVENous Q6H PRN    prochlorperazine (COMPAZINE) injection 5 mg  5 mg IntraVENous Q6H PRN       Matty Heredia MD

## 2018-03-14 NOTE — PROGRESS NOTES
3/14/2018  9:07 AM  Pt discharge noted. CM reviewed EMR. CM confirmed a personal care aide with [employer] will begin seeing pt today, and will continue for at least 2 weeks as needed. CM informed 0117 Kalin Mccoy of pt's discharge. CM confirmed with pt's  he has now secured a wheelchair, BSC, and RW. Pt's , who will arrive this AM, will provide transport for pt is PT feels it is appropriate. No additional DC service needs indicated.

## 2018-03-14 NOTE — DISCHARGE SUMMARY
Physician Discharge Summary     Patient ID:    Best Garcia  [id number]  41 y.o.  1955  Pina Santo MD    Admit date: 3/2/2018    Discharge date and time: 3/14/2018    Admission Diagnoses: RIGHT ANKLE FRACTURE; Ankle fracture, right;ANKLE FRACTURE    Chronic Diagnoses:    Problem List as of 3/14/2018  Date Reviewed: 8/24/2016          Codes Class Noted - Resolved    MARYJO (acute kidney injury) ([facility] ICD-10-CM: N17.9  ICD-9-CM: 584.9  3/3/2018 - Present        Hypokalemia due to loss of potassium ICD-10-CM: E87.6  ICD-9-CM: 276.8  3/3/2018 - Present        HTN (hypertension), benign ICD-10-CM: I10  ICD-9-CM: 401.1  3/3/2018 - Present        Depression with anxiety ICD-10-CM: F41.8  ICD-9-CM: 300.4  3/3/2018 - Present        Dyslipidemia ICD-10-CM: E78.5  ICD-9-CM: 272.4  3/3/2018 - Present        * (Principal)Ankle fracture, right ICD-10-CM: J34.045S  ICD-9-CM: 824.8  3/2/2018 - Present        Numbness and tingling of both legs below knees ICD-10-CM: R20.0, R20.2  ICD-9-CM: 782.0  11/30/2015 - Present        Numbness in feet ICD-10-CM: R20.0  ICD-9-CM: 782.0  10/23/2015 - Present        Burning sensation of feet ICD-10-CM: R20.8  ICD-9-CM: 782.0  10/23/2015 - Present        SAH (subarachnoid hemorrhage) ([facility] ICD-10-CM: I60.9  ICD-9-CM: 992  10/23/2015 - Present              Discharge Medications:   Current Discharge Medication List      START taking these medications    Details   amLODIPine (NORVASC) 10 mg tablet Take 1 Tab by mouth daily. Qty: 30 Tab, Refills: 0      folic acid (FOLVITE) 1 mg tablet Take 1 Tab by mouth daily. Qty: 30 Tab, Refills: 0      polyethylene glycol (MIRALAX) 17 gram packet Take 1 Packet by mouth daily. Qty: 30 Packet, Refills: 0      oxyCODONE-acetaminophen (PERCOCET) 5-325 mg per tablet Take 1 Tab by mouth every four (4) hours as needed. Max Daily Amount: 6 Tabs.   Qty: 30 Tab, Refills: 0    Associated Diagnoses: Fracture dislocation of right ankle joint, open type III, initial encounter      rivaroxaban (XARELTO) 10 mg tablet Take 1 Tab by mouth daily (with dinner). Qty: 30 Tab, Refills: 0         CONTINUE these medications which have NOT CHANGED    Details   DULoxetine (CYMBALTA) 20 mg capsule Take 20 mg by mouth daily. multivitamin, tx-iron-ca-min (THERA-M W/ IRON) 9 mg iron-400 mcg tab tablet Take 1 Tab by mouth daily. simvastatin (ZOCOR) 20 mg tablet Take 20 mg by mouth nightly. traZODone (DESYREL) 50 mg tablet Take  mg by mouth nightly as needed for Sleep.      gabapentin (NEURONTIN) 300 mg capsule Take 600 mg by mouth three (3) times daily. carvedilol (COREG) 12.5 mg tablet Take 12.5 mg by mouth two (2) times daily (with meals). hydroCHLOROthiazide (HYDRODIURIL) 25 mg tablet Take 25 mg by mouth daily. letrozole (FEMARA) 2.5 mg tablet Take 2.5 mg by mouth daily. losartan (COZAAR) 50 mg tablet Take 50 mg by mouth daily. Refills: 1      levothyroxine (SYNTHROID) 50 mcg tablet Take 50 mcg by mouth Daily (before breakfast). Refills: 1         STOP taking these medications       aspirin delayed-release 325 mg tablet Comments:   Reason for Stopping: Follow up Care:    1. Reema Jenkins MD with in 1 weeks  2. Ortho specialists as directed. Diet:  Regular Diet    Disposition:  Home.     Advanced Directive:    Discharge Exam:  [See today's progress note.]    CONSULTATIONS: Orthopedic Surgery    Significant Diagnostic Studies:   Recent Labs      03/13/18 0135 03/12/18 0205   WBC  10.2  7.8   HGB  10.5*  10.6*   HCT  32.8*  31.9*   PLT  349  300     Recent Labs      03/13/18 0135 03/12/18 0205   NA  136  136   K  4.2  3.6   CL  99  100   CO2  27  29   BUN  13  18   CREA  1.18*  0.92   GLU  305*  104*   CA  8.6  9.4   MG  1.6   --      Recent Labs      03/13/18 0135 03/12/18 0205   SGOT  20  15   ALT  6*  15   AP  75  72   TBILI  0.2  0.3   TP  6.5  6.8   ALB  2.8*  3.1*   GLOB  3.7  3.7       Lab Results Component Value Date/Time    Glucose (POC) 157 (H) 03/13/2018 11:27 AM    Glucose (POC) 152 (H) 03/13/2018 08:25 AM    Glucose (POC) 130 (H) 03/02/2018 06:42 PM    Glucose (POC) 241 (H) 07/06/2015 04:39 PM     Lab Results   Component Value Date/Time    TSH 2.770 10/23/2015 04:42 PM    T4, Free 0.93 10/23/2015 04:42 PM       HOSPITAL COURSE & TREATMENT RENDERED:   MARYJO on admit - resolved      Hypokalemia - resolved      HTN (hypertension), benign (3/3/2018): BP improved on current regimen      Depression with anxiety- Cymbalta      Dyslipidemia -  simvastatin       Ankle fracture, right Per ortho. --Surgery  3/12     Alcohol withdrawal as evidenced by confusion & agitation treated with IV Ativan 2mg and placed on CIWA protocol - resolved     Hx breast CA - continue femara     DVT proph - heparin subcu            Subjective:    62 yo WF, pt Dr Grisel Lai in our group, admitted after fall and severe fx of right ankle requiring 2 stage surg repair, first repair done 3/2/18. Pt reports no syncope or dizziness prior to fall and no prodrome events. She did walk on the foot for 2 days post fall and had little pain due to her chronic LE neuropathy. We are asked to see her for her medical problems including elevated creat. She does report marked decrease in intake for 2 days since her fall. She has a hx of HTN which has been relatively well controlled outpt. She also has a hx hypothyroidism, LE neuropathy, depression with anxiety and hypothyroidism and reports she is compliant with her medications. In addition, she reports Alcoholism and reports she has been sober for [facility] couple of months. \"  She is going to call her sponsor today as she is feeling additional stress from the fall and surg. Currently she is stable for follow up surg.       3/4: Had more agitation and confusion last night and had to start CIWA protocol.  She is better this am but still having problems with word retrieval. She reports to me that her last drink was Monday but told Dr Carpenter Sender it was months ago. Potassium still low at 2.9. Will replete with po.      3/5:  Agitation and confusion have resolved. K+ back up after repletion. Discussed with ortho.      3/6:  Feeling better this AM.  BP spiked last night and Amlodipine added and resumed her usual BP meds - BP now better. No further signs of ETOH withdrawal.       3/7: BP is much better. She slept well last night. No signs of withdrawal. Case management working on placement until her surgery. She is not safe to go home. Potassium a little low.      3/8:  No new complaints. K+ back up. BP ok. Medical problems stable.        3/9:  Little pain at this time. K+ ok. BP ok. Margoth Scherer discussion held about her ETOH and she is resolved to stop ETOH.      3/10: No acute complaints. Anxious about surgery. Denies CP, SOB. Pain in foot controlled. Tolerating po. +BM.    3/11: No acute events overnight. Notes that swelling in foot has diminished.     3/12:  No complaints except discomfort from right leg. Stable for surg later today.       3/13:  Surg yesterday PM - pt in cast today with little pain currently. Glucose elevated on this AMs lab but bedside fingerstick glucose OK 1 hour post prandial (150) - check HbA1C.     3/14: Feeling much better. She has home equipment set up and is ready to go home. Understands importance of follow up. Has care set up at home.  Has been speaking with her sponsor regarding her alcoholism.           Past Medical History:   Diagnosis Date    Alcohol abuse      Anxiety      Depression      Fatigue      Hypertension      Memory loss      Vertigo      Visual disturbance              Past Surgical History:   Procedure Laterality Date    HX ANKLE FRACTURE TX Right 12/2016    HX APPENDECTOMY        HX ORTHOPAEDIC         right knee repair    HX OTHER SURGICAL   04/2016     double mastectomy     HX OTHER SURGICAL   06/2016     reconstructive surgery on her breasts    HX SHOULDER REPLACEMENT Right 2016    HX WRIST FRACTURE TX Left 2016      Social History            Social History    Marital status:        Spouse name: N/A    Number of children: N/A    Years of education: N/A          Occupational History    Not on file.              Social History Main Topics     Smoking status: Never Smoker     Smokeless tobacco: Never Used     Alcohol use 21.0 oz/week       35 Glasses of wine per week         Comment: last drink was      Drug use: Not on file     Sexual activity: Not on file            Other Topics Concern    Not on file      Social History Narrative            Family History   Problem Relation Age of Onset    Cancer Mother      Other Father              Allergies   Allergen Reactions    Sulfa (Sulfonamide Antibiotics) Swelling and Other (comments)       Flushing and hot feeling   Facial swelling           Patient Active Problem List   Diagnosis Code    Numbness in feet R20.0    Burning sensation of feet R20.8    [facility]H [facility]arach[facility]oid hemorrhage[facility] ([facility]) I60.9    Numbness and tingling of both legs below knees R20.0, R20.2    Ankle fracture, right S82.891A    MARYJO (acute kidney injury) ([facility] [address] N17.9    Hypokalemia due to loss of potassium E87.6    HTN (hypertension), benign I10    Depression with anxiety F41.8    Dyslipidemia E78. 5      Review of Systems:   Neg 12 system ROS other than what is mentioned in subjective.     Objective:   Physical Exam:           Visit Vitals    /74 (BP 1 Location: Right arm, BP Patient Position: Head of bed elevated (Comment degrees))    Pulse 82    Temp 98.8 °F (37.1 °C)    Resp 15    Ht 5' 7\" (1.702 m)    Wt 154 lb (69.9 kg)    SpO2 93%    Breastfeeding No    BMI 24.12 kg/m2    O2 Flow Rate (L/min): 2 l/min O2 Device: Room air     Temp (24hrs), Av.2 °F (36.8 °C), Min:97.9 °F (36.6 °C), Max:98.8 °F (37.1 °C)        1901 -  0700  In: 0 [P.O.:240;  I.V.:1800]  Out: 1375 [[id number]]     General:  Alert, cooperative, no distress, appears stated age. Head:  Normocephalic, without obvious abnormality, atraumatic. Eyes:  Conjunctivae/corneas clear. PERRL, EOMs intact. Throat: Lips, mucosa, and tongue moist..   Neck: Supple, symmetrical   Lungs:   Clear to auscultation bilaterally. Heart:  Regular rate and rhythm, S1, S2 normal, no murmur, click, rub or gallop. Abdomen:   Soft, non-tender. Bowel sounds normal. No masses,  No organomegaly. Extremities: no cyanosis or edema. Right LE in cast with bone stimulator inplace. Toes mobile to command. Feet warm. Dressing dry. Skin: Skin color, texture, turgor normal. No rashes or lesions   Neurologic: CNII-XII intact. Alert and oriented X 3.        Data Review:   EXAM:  XR TIB/FIB RT, XR ANKLE RT MIN 3 V 3/2/18  INDICATION:  fx.  Right ankle deformity after a fall  COMPARISON: None. FINDINGS: AP and lateral  views of the right tibia and fibula. 3 views of the  right ankle. The patient is status post ORIF of the patella. There is mild medial compartment  osteophytosis in the knee. There is chronic deformity of the fibular neck. There  is a displaced fracture of the medial malleolus. The medial malleolus is 1.5 cm  from the distal tibia. There is a comminuted displaced fracture of the lateral  malleolus at the level of the tibiotalar joint. The talus and distal lateral  malleolus are dislocated laterally by 2.8 cm and slightly retracted.   IMPRESSION: Bimalleolar ankle fracture dislocation        Signed:  Will Antonio MD  3/14/2018  7:27 AM

## 2018-03-14 NOTE — PROGRESS NOTES
PT/OT saw patient this a.m. Discharge order in place. IV removed. Discharge instructions reviewed with the patient and her . Scripts given. E-signed. All questions and concerns addressed. Teach back completed.

## 2018-03-14 NOTE — PROGRESS NOTES
Problem: Mobility Impaired (Adult and Pediatric)  Goal: *Acute Goals and Plan of Care (Insert Text)  Physical Therapy Goals  Revised 3/13/2018  1. Patient will move from supine to sit and sit to supine , scoot up and down and roll side to side in bed with modified independence within 7 day(s). 2.  Patient will transfer from bed to chair and chair to bed with modified independence using the least restrictive device within 7 day(s). 3.  Patient will perform sit <> stand with modified independence within 7 day(s). 4.  Patient will ascend/descend 4 stairs with 1 handrail(s) with crutches and NWB RLE and minimal assistance within 7 day(s). Physical Therapy Goals  Revised 3/6/2018- Goals upgraded as progress noted today  1. Patient will move from supine to sit and sit to supine and scoot up and down  in bed with modified independence within 7 day(s). Progress- continue  2. Patient will transfer from bed to chair and chair to bed with supervision/set-up with RW, NWB RLE within 7 day(s). Met - continue to MOD I  3. Patient will perform sit <>stand with modified independence within 7 day(s). Progress- continue  4. Patient will ambulate with supervision/set-up for 10' or less with RW, NWB RLE within 7 day(s). NOT MET - discontinue      Physical Therapy Goals  Initiated 3/3/2018  1. Patient will move from supine to sit and sit to supine  in bed with supervision within 7 day(s). MET 3/6/18 see above  2. Patient will transfer from bed to chair and chair to bed with minimal assistance using the least restrictive device within 7 day(s). Almost Achieved 3/6/18 see above  3. Patient will perform sit to stand with minimal assistance within 7 day(s). Met 3/6/18- see above  4. Patient will ambulate with minimal assistance for 25 feet with the least restrictive device within 7 day(s). NOT MET- HOLD at this time and Continue per orders transfers only            Patient being discharged to home today.  Patient's  transporting home this morning. Reviewed Home exercise Program with patient and answered questions. Patient finishing make up and hair and brushing teeth in long sitting in bed this am. Patient's transfers are status quo as we have been working on stand pivot transfers with RW and maintaining NWB RLE for last 11 days. Will discuss again importance of fall prevention, abstaining ETOH, 1;1 supervision, car transfers and adjust patient's new RW with  once he arrives with it. Patient's  has arranged 24/7 care between his care and hired private duty care for next 2-3 weeks. Per patient patient's  has secured all of patient's DME for home : [facility], and Wheelchair with elevating legrests. Patient voiced again she understands the importance of avoiding all ETOH and increasing her risk of altered mental status and then WB RLE and/or falling. Patient has been told by surgeon that if she weight bears on RLE she runs the risk of further surgery and possibly amputation of Right foot /leg. Patient voices she is done with her drinking and she will have so much 1:1 assist/supervision at home that even if she wanted to drink she will not have any access. Patient states she has contacted her sponsor AA as well. Patient's ankle fracture and past wrist/hand fracture were result of falls related to ETOH. Patient is cleared for discharge to home from PT standpoint. HHPT follow up has been arranged. Informed RN.

## 2018-03-14 NOTE — PROGRESS NOTES
Problem: Self Care Deficits Care Plan (Adult)  Goal: *Acute Goals and Plan of Care (Insert Text)  Occupational Therapy Goals  Initiated 3/13/2018  1. Patient will perform lower body dressing with minimal assistance/contact guard assist within 7 day(s). 2.  Patient will perform toilet transfers with supervision/set-up within 7 day(s). 3.  Patient will perform all aspects of toileting with supervision/set-up within 7 day(s). 4.  Patient will participate in upper extremity therapeutic exercise/activities with supervision/set-up for 10 minutes within 7 day(s). 5.  Patient will utilize energy conservation techniques during functional activities with verbal cues within 7 day(s). Occupational Therapy TREATMENT  Patient: Francisco Dennis (80 y.o. female)  Date: 3/14/2018  Diagnosis: RIGHT ANKLE FRACTURE  Ankle fracture, right  ANKLE FRACTURE Ankle fracture, right  Procedure(s) (LRB):  RIGHT ANKLE EXTERNAL FIXATOR REMOVAL, RIGHT ANKLE FUSION WITH WHEELER MEDICAL PLATES (Right) 2 Days Post-Op  Precautions: Bed Alarm, Fall, NWB (NWB RLE - keep bone stimulator and PRAFO in place)  Chart, occupational therapy assessment, plan of care, and goals were reviewed. ASSESSMENT:  Pt agreeable to OT. She demonstrated transfer to drop arm bedside commode with min assist, NWB R LE. Pt able to perform her own bladder hygiene in sitting. She felt transfer to commode went easier than other times and may be due to drop arm feature of commode. Spoke to  who stated her  has a BSC but not sure whether it has drop arm feature or not. In any case, recommend pt have assist for toileting transfers. Pt to have personal care aide 8 hours a day and home health. Also issued pt yellow theraband and showed her 3 exercises she can perform to increase strength and endurance for functional tasks. Recommend home health.   Progression toward goals:  [x]       Improving appropriately and progressing toward goals  []       Improving slowly and progressing toward goals  []       Not making progress toward goals and plan of care will be adjusted     PLAN:  Patient continues to benefit from skilled intervention to address the above impairments. Continue treatment per established plan of care. Discharge Recommendations:  Home health  Further Equipment Recommendations for Discharge: W/c, bedside commode, walker     SUBJECTIVE:   Patient stated You got me right? Luis Mike    OBJECTIVE DATA SUMMARY:   Cognitive/Behavioral Status:  Neurologic State: Alert  Orientation Level: Oriented X4  Cognition: Appropriate decision making             Functional Mobility and Transfers for ADLs:  Bed Mobility:   Contact  guard supine to sit and sit to supine. Transfers:     Functional Transfers  Toilet Transfer : Minimum assistance  Cues: Physical assistance;Verbal cues provided  Adaptive Equipment: Bedside commode;Walker (comment); Other (comment) (drop arm for increased safety)    Balance:  Sitting: Intact  Standing: Impaired; With support    ADL Intervention:       Toileting  Bladder Hygiene: Modified independent in sitting. Transfers to drop arm bedside commode with min assist.     Therapeutic Exercises:   Pt issued 3 exercises to perform to increase strength and endurance for functional tasks. Pain:  Pain Scale 1: Numeric (0 - 10)  Pain Intensity 1: 5  Pain Location 1: Ankle  Pain Orientation 1: Right  Pain Description 1: Aching  Pain Intervention(s) 1: Medication (see MAR)  Activity Tolerance:   Fair  Please refer to the flowsheet for vital signs taken during this treatment.   After treatment:   [] Patient left in no apparent distress sitting up in chair  [x] Patient left in no apparent distress in bed  [x] Call bell left within reach  [x] Nursing notified  [] Caregiver present  [] Bed alarm activated    COMMUNICATION/COLLABORATION:   The patients plan of care was discussed with: Physical Therapist, Occupational Therapist, Registered Nurse and Social Worker    Melinda Plasencia, AKBAR/L  Time Calculation: 27 mins

## 2018-03-15 ENCOUNTER — HOME CARE VISIT (OUTPATIENT)
Dept: SCHEDULING | Facility: HOME HEALTH | Age: 63
End: 2018-03-15
Payer: COMMERCIAL

## 2018-03-15 PROCEDURE — G0151 HHCP-SERV OF PT,EA 15 MIN: HCPCS

## 2018-03-16 PROCEDURE — E0700 SAFETY EQUIPMENT: HCPCS

## 2018-03-17 VITALS
DIASTOLIC BLOOD PRESSURE: 70 MMHG | HEART RATE: 73 BPM | TEMPERATURE: 98.3 F | SYSTOLIC BLOOD PRESSURE: 102 MMHG | OXYGEN SATURATION: 97 % | RESPIRATION RATE: 16 BRPM

## 2018-03-19 ENCOUNTER — HOME CARE VISIT (OUTPATIENT)
Dept: SCHEDULING | Facility: HOME HEALTH | Age: 63
End: 2018-03-19
Payer: COMMERCIAL

## 2018-03-19 VITALS
OXYGEN SATURATION: 97 % | TEMPERATURE: 97.3 F | DIASTOLIC BLOOD PRESSURE: 78 MMHG | RESPIRATION RATE: 18 BRPM | HEART RATE: 78 BPM | SYSTOLIC BLOOD PRESSURE: 120 MMHG

## 2018-03-19 PROCEDURE — G0151 HHCP-SERV OF PT,EA 15 MIN: HCPCS

## 2018-03-22 ENCOUNTER — HOME CARE VISIT (OUTPATIENT)
Dept: SCHEDULING | Facility: HOME HEALTH | Age: 63
End: 2018-03-22
Payer: COMMERCIAL

## 2018-03-22 PROCEDURE — G0151 HHCP-SERV OF PT,EA 15 MIN: HCPCS

## 2018-03-24 VITALS
TEMPERATURE: 98.6 F | HEART RATE: 82 BPM | SYSTOLIC BLOOD PRESSURE: 122 MMHG | OXYGEN SATURATION: 97 % | DIASTOLIC BLOOD PRESSURE: 78 MMHG | RESPIRATION RATE: 16 BRPM

## 2018-03-26 ENCOUNTER — HOME CARE VISIT (OUTPATIENT)
Dept: SCHEDULING | Facility: HOME HEALTH | Age: 63
End: 2018-03-26
Payer: COMMERCIAL

## 2018-03-26 VITALS
OXYGEN SATURATION: 98 % | HEART RATE: 88 BPM | SYSTOLIC BLOOD PRESSURE: 128 MMHG | TEMPERATURE: 98.3 F | DIASTOLIC BLOOD PRESSURE: 70 MMHG | RESPIRATION RATE: 16 BRPM

## 2018-03-26 PROCEDURE — G0151 HHCP-SERV OF PT,EA 15 MIN: HCPCS

## 2018-03-29 ENCOUNTER — HOME CARE VISIT (OUTPATIENT)
Dept: SCHEDULING | Facility: HOME HEALTH | Age: 63
End: 2018-03-29
Payer: COMMERCIAL

## 2018-03-29 PROCEDURE — G0151 HHCP-SERV OF PT,EA 15 MIN: HCPCS

## 2018-03-31 VITALS
SYSTOLIC BLOOD PRESSURE: 100 MMHG | DIASTOLIC BLOOD PRESSURE: 68 MMHG | TEMPERATURE: 98.3 F | HEART RATE: 72 BPM | RESPIRATION RATE: 16 BRPM | OXYGEN SATURATION: 98 %

## 2020-05-08 NOTE — ANESTHESIA PREPROCEDURE EVALUATION
Anesthetic History   No history of anesthetic complications            Review of Systems / Medical History  Patient summary reviewed, nursing notes reviewed and pertinent labs reviewed    Pulmonary  Within defined limits                 Neuro/Psych   Within defined limits           Cardiovascular    Hypertension                   GI/Hepatic/Renal  Within defined limits              Endo/Other  Within defined limits           Other Findings            Physical Exam    Airway  Mallampati: II  TM Distance: 4 - 6 cm  Neck ROM: normal range of motion   Mouth opening: Normal     Cardiovascular    Rhythm: regular  Rate: normal         Dental  No notable dental hx       Pulmonary  Breath sounds clear to auscultation               Abdominal         Other Findings            Anesthetic Plan    ASA: 2  Anesthesia type: general      Post-op pain plan if not by surgeon: peripheral nerve block single      Anesthetic plan and risks discussed with: Patient
[Negative] : Heme/Lymph

## 2022-03-18 PROBLEM — N17.9 AKI (ACUTE KIDNEY INJURY) (HCC): Status: ACTIVE | Noted: 2018-03-03

## 2022-03-19 PROBLEM — S82.891A ANKLE FRACTURE, RIGHT: Status: ACTIVE | Noted: 2018-03-02

## 2022-03-19 PROBLEM — I10 HTN (HYPERTENSION), BENIGN: Status: ACTIVE | Noted: 2018-03-03

## 2022-03-19 PROBLEM — E87.6 HYPOKALEMIA DUE TO LOSS OF POTASSIUM: Status: ACTIVE | Noted: 2018-03-03

## 2022-03-19 PROBLEM — F41.8 DEPRESSION WITH ANXIETY: Status: ACTIVE | Noted: 2018-03-03

## 2022-03-20 PROBLEM — E78.5 DYSLIPIDEMIA: Status: ACTIVE | Noted: 2018-03-03

## 2023-04-20 ENCOUNTER — HOSPITAL ENCOUNTER (OUTPATIENT)
Dept: CT IMAGING | Age: 68
Discharge: HOME | End: 2023-04-20
Attending: PHYSICIAN ASSISTANT
Payer: MEDICARE

## 2023-04-20 DIAGNOSIS — M16.12 PRIMARY LOCALIZED OSTEOARTHROSIS OF LEFT HIP: ICD-10-CM

## 2023-04-20 DIAGNOSIS — M25.552 LEFT HIP PAIN: ICD-10-CM

## 2023-04-20 PROCEDURE — 73700 CT LOWER EXTREMITY W/O DYE: CPT

## 2023-05-17 ENCOUNTER — HOSPITAL ENCOUNTER (OUTPATIENT)
Facility: HOSPITAL | Age: 68
Discharge: HOME | End: 2023-05-20
Payer: MEDICARE

## 2023-05-17 VITALS
HEIGHT: 67 IN | RESPIRATION RATE: 12 BRPM | WEIGHT: 161.7 LBS | HEART RATE: 80 BPM | BODY MASS INDEX: 25.38 KG/M2 | TEMPERATURE: 97.1 F | DIASTOLIC BLOOD PRESSURE: 89 MMHG | OXYGEN SATURATION: 97 % | SYSTOLIC BLOOD PRESSURE: 164 MMHG

## 2023-05-17 LAB
ALBUMIN SERPL-MCNC: 3.6 G/DL (ref 3.5–5)
ALBUMIN/GLOB SERPL: 1 (ref 1.1–2.2)
ALP SERPL-CCNC: 91 U/L (ref 45–117)
ALT SERPL-CCNC: 23 U/L (ref 12–78)
ANION GAP SERPL CALC-SCNC: 4 MMOL/L (ref 5–15)
APPEARANCE UR: ABNORMAL
AST SERPL-CCNC: 25 U/L (ref 15–37)
BACTERIA URNS QL MICRO: ABNORMAL /HPF
BASOPHILS # BLD: 0.1 K/UL (ref 0–0.1)
BASOPHILS NFR BLD: 1 % (ref 0–1)
BILIRUB SERPL-MCNC: 0.3 MG/DL (ref 0.2–1)
BILIRUB UR QL: NEGATIVE
BUN SERPL-MCNC: 10 MG/DL (ref 6–20)
BUN/CREAT SERPL: 12 (ref 12–20)
CALCIUM SERPL-MCNC: 8.9 MG/DL (ref 8.5–10.1)
CHLORIDE SERPL-SCNC: 105 MMOL/L (ref 97–108)
CO2 SERPL-SCNC: 28 MMOL/L (ref 21–32)
COLOR UR: ABNORMAL
CREAT SERPL-MCNC: 0.86 MG/DL (ref 0.55–1.02)
CRP SERPL-MCNC: 0.47 MG/DL (ref 0–0.6)
DIFFERENTIAL METHOD BLD: ABNORMAL
EOSINOPHIL # BLD: 0.2 K/UL (ref 0–0.4)
EOSINOPHIL NFR BLD: 3 % (ref 0–7)
EPITH CASTS URNS QL MICRO: ABNORMAL /LPF
ERYTHROCYTE [DISTWIDTH] IN BLOOD BY AUTOMATED COUNT: 15 % (ref 11.5–14.5)
ERYTHROCYTE [SEDIMENTATION RATE] IN BLOOD: 43 MM/HR (ref 0–30)
EST. AVERAGE GLUCOSE BLD GHB EST-MCNC: 100 MG/DL
GLOBULIN SER CALC-MCNC: 3.5 G/DL (ref 2–4)
GLUCOSE SERPL-MCNC: 102 MG/DL (ref 65–100)
GLUCOSE UR STRIP.AUTO-MCNC: NEGATIVE MG/DL
HBA1C MFR BLD: 5.1 % (ref 4–5.6)
HCT VFR BLD AUTO: 35.5 % (ref 35–47)
HGB BLD-MCNC: 11.3 G/DL (ref 11.5–16)
HGB UR QL STRIP: ABNORMAL
HYALINE CASTS URNS QL MICRO: ABNORMAL /LPF (ref 0–2)
IMM GRANULOCYTES # BLD AUTO: 0 K/UL (ref 0–0.04)
IMM GRANULOCYTES NFR BLD AUTO: 0 % (ref 0–0.5)
KETONES UR QL STRIP.AUTO: ABNORMAL MG/DL
LEUKOCYTE ESTERASE UR QL STRIP.AUTO: ABNORMAL
LYMPHOCYTES # BLD: 1.6 K/UL (ref 0.8–3.5)
LYMPHOCYTES NFR BLD: 23 % (ref 12–49)
MCH RBC QN AUTO: 31.6 PG (ref 26–34)
MCHC RBC AUTO-ENTMCNC: 31.8 G/DL (ref 30–36.5)
MCV RBC AUTO: 99.2 FL (ref 80–99)
MONOCYTES # BLD: 0.5 K/UL (ref 0–1)
MONOCYTES NFR BLD: 7 % (ref 5–13)
NEUTS SEG # BLD: 4.4 K/UL (ref 1.8–8)
NEUTS SEG NFR BLD: 66 % (ref 32–75)
NITRITE UR QL STRIP.AUTO: POSITIVE
NRBC # BLD: 0 K/UL (ref 0–0.01)
NRBC BLD-RTO: 0 PER 100 WBC
PH UR STRIP: 5.5 (ref 5–8)
PLATELET # BLD AUTO: 157 K/UL (ref 150–400)
PMV BLD AUTO: 11 FL (ref 8.9–12.9)
POTASSIUM SERPL-SCNC: 3.6 MMOL/L (ref 3.5–5.1)
PROT SERPL-MCNC: 7.1 G/DL (ref 6.4–8.2)
PROT UR STRIP-MCNC: NEGATIVE MG/DL
RBC # BLD AUTO: 3.58 M/UL (ref 3.8–5.2)
RBC #/AREA URNS HPF: ABNORMAL /HPF (ref 0–5)
SODIUM SERPL-SCNC: 137 MMOL/L (ref 136–145)
SP GR UR REFRACTOMETRY: 1.02 (ref 1–1.03)
URINE CULTURE IF INDICATED: ABNORMAL
UROBILINOGEN UR QL STRIP.AUTO: 0.2 EU/DL (ref 0.2–1)
WBC # BLD AUTO: 6.7 K/UL (ref 3.6–11)
WBC URNS QL MICRO: >100 /HPF (ref 0–4)

## 2023-05-17 PROCEDURE — 85025 COMPLETE CBC W/AUTO DIFF WBC: CPT

## 2023-05-17 PROCEDURE — 87077 CULTURE AEROBIC IDENTIFY: CPT

## 2023-05-17 PROCEDURE — 87086 URINE CULTURE/COLONY COUNT: CPT

## 2023-05-17 PROCEDURE — 86140 C-REACTIVE PROTEIN: CPT

## 2023-05-17 PROCEDURE — 86900 BLOOD TYPING SEROLOGIC ABO: CPT

## 2023-05-17 PROCEDURE — 36415 COLL VENOUS BLD VENIPUNCTURE: CPT

## 2023-05-17 PROCEDURE — 81001 URINALYSIS AUTO W/SCOPE: CPT

## 2023-05-17 PROCEDURE — 84466 ASSAY OF TRANSFERRIN: CPT

## 2023-05-17 PROCEDURE — 87186 SC STD MICRODIL/AGAR DIL: CPT

## 2023-05-17 PROCEDURE — 85652 RBC SED RATE AUTOMATED: CPT

## 2023-05-17 PROCEDURE — 86901 BLOOD TYPING SEROLOGIC RH(D): CPT

## 2023-05-17 PROCEDURE — 83036 HEMOGLOBIN GLYCOSYLATED A1C: CPT

## 2023-05-17 PROCEDURE — 86850 RBC ANTIBODY SCREEN: CPT

## 2023-05-17 PROCEDURE — 80053 COMPREHEN METABOLIC PANEL: CPT

## 2023-05-17 RX ORDER — ASPIRIN 81 MG/1
81 TABLET, CHEWABLE ORAL DAILY
COMMUNITY

## 2023-05-17 RX ORDER — ACETAMINOPHEN 160 MG
TABLET,DISINTEGRATING ORAL
COMMUNITY

## 2023-05-17 RX ORDER — DULOXETIN HYDROCHLORIDE 30 MG/1
30 CAPSULE, DELAYED RELEASE ORAL DAILY
COMMUNITY

## 2023-05-17 RX ORDER — LEVOTHYROXINE SODIUM 0.05 MG/1
50 TABLET ORAL DAILY
COMMUNITY

## 2023-05-17 RX ORDER — BUSPIRONE HYDROCHLORIDE 10 MG/1
10 TABLET ORAL 2 TIMES DAILY
COMMUNITY

## 2023-05-17 RX ORDER — TRAZODONE HYDROCHLORIDE 50 MG/1
50 TABLET ORAL NIGHTLY
COMMUNITY

## 2023-05-17 RX ORDER — UBIDECARENONE 75 MG
1000 CAPSULE ORAL DAILY
COMMUNITY

## 2023-05-17 RX ORDER — CARVEDILOL 25 MG/1
25 TABLET ORAL 2 TIMES DAILY WITH MEALS
COMMUNITY

## 2023-05-17 RX ORDER — SIMVASTATIN 20 MG
20 TABLET ORAL NIGHTLY
COMMUNITY
Start: 2015-03-13

## 2023-05-17 ASSESSMENT — ENCOUNTER SYMPTOMS
VOMITING: 0
ABDOMINAL PAIN: 0
SORE THROAT: 0
WHEEZING: 0
BLOOD IN STOOL: 0
SHORTNESS OF BREATH: 0
NAUSEA: 0
COUGH: 0
TROUBLE SWALLOWING: 0

## 2023-05-17 NOTE — PERIOP NOTE
[address] [facility] [address], [address] [facility]   MAIN OR                                  [phone]   MAIN PRE OP                          [phone]                                                                                AMBULATORY PRE OP          [phone]  PRE-ADMISSION TESTING    [phone]     Surgery Date:  Friday 5/26/2023       Is surgery arrival time given by surgeon? NO  If NO, [facility] staff will call you between 3 and 7pm the day before your surgery with your arrival time. (If your surgery is on a Monday, we will call you the Friday before.)    Call [phone] after 7pm Monday-Friday if you did not receive this call. INSTRUCTIONS BEFORE YOUR SURGERY   When You  Arrive Arrive at the [address] [facility] on the day of your surgery  Have your insurance card, photo ID, and any copayment (if needed)   Food   and   Drink NO food or drink after midnight the night before surgery    This means NO water, gum, mints, coffee, juice, etc.  No alcohol (beer, wine, liquor) 24 hours before and after surgery   Medications to   TAKE   Morning of Surgery MEDICATIONS TO TAKE THE MORNING OF SURGERY WITH A SIP OF WATER:   Carvedilol, buspirone, duloxetine, pregabalin, levothyroxine   Medications  To  STOP      7 days before surgery Non-Steroidal anti-inflammatory Drugs (NSAID's): for example, Ibuprofen (Advil, Motrin), Naproxen (Aleve)  Aspirin, if taking for pain   Herbal supplements, vitamins, and fish oil  Other:  (Pain medications not listed above, including Tylenol may be taken)   Blood  Thinners If you take  Aspirin, Plavix, Coumadin, or any blood-thinning or anti-blood clot medicine, talk to the doctor who prescribed the medications for pre-operative instructions.    Bathing Clothing  Jewelry  Valuables     If you shower the morning of surgery, please do not apply anything to your skin (lotions, powders, deodorant, or makeup,

## 2023-05-17 NOTE — PERIOP NOTE
Pt stated in PAT that she takes aspirin daily for heart health, stating that her cardiologist recommended she not take it, pt stated her cardiologist told her she does not need to take it.  Pt instructed to hold aspirin one week prior to surgery

## 2023-05-17 NOTE — H&P
Tiara Wyatt was referred for evaluation by:Dr. Laly Cannon for Pre- Op Evaluation. Please see encounter details and orders for consultative summary. Type of surgery : Left Total Hip Arthroplasty, Direct Anterior, David  Surgery site : Left hip  Anesthesia type: Spinal  Date of procedure:  5/26/2023    This 76y.o. year old female presents with complaints of left hip pain for months and pain has been progressively worsening. Conservative measures including medication management and activity modification have been exhausted prior to the decision for surgery. Patient has discussed the risks, alternatives, and benefits of the surgery with surgeon and has elected to proceed with surgical intervention. Allergies: Allergies   Allergen Reactions    Sulfa Antibiotics Swelling     Face and neck swelling     Latex allergy: no  Prior reactions to anesthesia:  None     Current Outpatient Medications   Medication Sig    simvastatin (ZOCOR) 20 MG tablet Take 1 tablet by mouth nightly    busPIRone (BUSPAR) 10 MG tablet Take 1 tablet by mouth in the morning and 1 tablet in the evening. DULoxetine (CYMBALTA) 30 MG extended release capsule Take 1 capsule by mouth daily    levothyroxine (SYNTHROID) 50 MCG tablet Take 1 tablet by mouth Daily    aspirin 81 MG chewable tablet Take 1 tablet by mouth daily    Multiple Vitamins-Minerals (CENTRUM SILVER PO) Take by mouth    Cholecalciferol (VITAMIN D3) 50 MCG (2000 UT) CAPS Take by mouth    CRANBERRY PO Take 1 tablet by mouth Daily Cranberry with vitamin c    vitamin B-12 (CYANOCOBALAMIN) 100 MCG tablet Take 10 tablets by mouth daily    carvedilol (COREG) 25 MG tablet Take 1 tablet by mouth 2 times daily (with meals)    traZODone (DESYREL) 50 MG tablet Take 1 tablet by mouth at bedtime     No current facility-administered medications for this encounter.      Past Medical History:   Diagnosis Date    Alcohol abuse     Aneurysm of thoracic aorta (HCC)     Anxiety     Arthritis

## 2023-05-18 LAB
ABO + RH BLD: NORMAL
BACTERIA SPEC CULT: NORMAL
BACTERIA SPEC CULT: NORMAL
BLOOD GROUP ANTIBODIES SERPL: NORMAL
EKG ATRIAL RATE: 73 BPM
EKG DIAGNOSIS: NORMAL
EKG P AXIS: 56 DEGREES
EKG P-R INTERVAL: 182 MS
EKG Q-T INTERVAL: 394 MS
EKG QRS DURATION: 80 MS
EKG QTC CALCULATION (BAZETT): 434 MS
EKG R AXIS: 30 DEGREES
EKG T AXIS: 51 DEGREES
EKG VENTRICULAR RATE: 73 BPM
SERVICE CMNT-IMP: NORMAL
SPECIMEN EXP DATE BLD: NORMAL
TRANSFERRIN SERPL-MCNC: 205 MG/DL (ref 192–364)

## 2023-05-19 LAB
BACTERIA SPEC CULT: ABNORMAL
CC UR VC: ABNORMAL
SERVICE CMNT-IMP: ABNORMAL

## 2023-05-19 RX ORDER — CIPROFLOXACIN 500 MG/1
500 TABLET, FILM COATED ORAL 2 TIMES DAILY
Qty: 14 TABLET | Refills: 0 | Status: SHIPPED | OUTPATIENT
Start: 2023-05-19 | End: 2023-05-26

## 2023-05-19 NOTE — PROGRESS NOTES
Rx for Cipro 500 mg BID x 5 days sent for +urine culture, Rx for Mupirocin ointment and Chlorhexidine 4% solution.

## 2023-05-19 NOTE — PROGRESS NOTES
5/19 @ 1121:  notified Augusta Peterson re +MSSA & >100K gram neg rods on urine culture    5/19 @ 1417:  Spoke with patient to inform her of nares culture positive for MSSA. NP will send Bactroban & hibiclens scripts to confirmed local pharmacy. Referred patient to the MRSA/MSSA education sheet which was reviewed during PAT appointment. Patient verbalized understanding & agreed to initiate medication today & start hibiclens wash 5 days prior to surgery. Also, UC shows e.coli, informed pt an antibiotic will also be sent over for treatment of that infection.

## 2023-05-24 ENCOUNTER — ANESTHESIA EVENT (OUTPATIENT)
Facility: HOSPITAL | Age: 68
End: 2023-05-24
Payer: MEDICARE

## 2023-05-25 ENCOUNTER — ANESTHESIA (OUTPATIENT)
Facility: HOSPITAL | Age: 68
End: 2023-05-25
Payer: MEDICARE

## 2023-05-25 ENCOUNTER — HOSPITAL ENCOUNTER (OUTPATIENT)
Facility: HOSPITAL | Age: 68
Setting detail: OBSERVATION
LOS: 1 days | Discharge: HOME | End: 2023-05-30
Attending: ORTHOPAEDIC SURGERY | Admitting: ORTHOPAEDIC SURGERY
Payer: MEDICARE

## 2023-05-25 ENCOUNTER — APPOINTMENT (OUTPATIENT)
Facility: HOSPITAL | Age: 68
End: 2023-05-25
Attending: ORTHOPAEDIC SURGERY
Payer: MEDICARE

## 2023-05-25 DIAGNOSIS — M16.12 ARTHRITIS OF LEFT HIP: Primary | ICD-10-CM

## 2023-05-25 PROBLEM — Z96.642 S/P HIP REPLACEMENT, LEFT: Status: ACTIVE | Noted: 2023-05-25

## 2023-05-25 PROCEDURE — 72170 X-RAY EXAM OF PELVIS: CPT

## 2023-05-25 PROCEDURE — 6370000000 HC RX 637 (ALT 250 FOR IP): Performed by: ORTHOPAEDIC SURGERY

## 2023-05-25 PROCEDURE — 6360000002 HC RX W HCPCS: Performed by: NURSE ANESTHETIST, CERTIFIED REGISTERED

## 2023-05-25 PROCEDURE — 6360000002 HC RX W HCPCS: Performed by: PHYSICIAN ASSISTANT

## 2023-05-25 PROCEDURE — 2580000003 HC RX 258: Performed by: ORTHOPAEDIC SURGERY

## 2023-05-25 PROCEDURE — A4216 STERILE WATER/SALINE, 10 ML: HCPCS | Performed by: PHYSICIAN ASSISTANT

## 2023-05-25 PROCEDURE — 3600000015 HC SURGERY LEVEL 5 ADDTL 15MIN: Performed by: ORTHOPAEDIC SURGERY

## 2023-05-25 PROCEDURE — 6360000002 HC RX W HCPCS: Performed by: ORTHOPAEDIC SURGERY

## 2023-05-25 PROCEDURE — G0378 HOSPITAL OBSERVATION PER HR: HCPCS

## 2023-05-25 PROCEDURE — 2500000003 HC RX 250 WO HCPCS: Performed by: PHYSICIAN ASSISTANT

## 2023-05-25 PROCEDURE — 3700000000 HC ANESTHESIA ATTENDED CARE: Performed by: ORTHOPAEDIC SURGERY

## 2023-05-25 PROCEDURE — 2500000003 HC RX 250 WO HCPCS: Performed by: NURSE ANESTHETIST, CERTIFIED REGISTERED

## 2023-05-25 PROCEDURE — 3600000005 HC SURGERY LEVEL 5 BASE: Performed by: ORTHOPAEDIC SURGERY

## 2023-05-25 PROCEDURE — 2580000003 HC RX 258: Performed by: PHYSICIAN ASSISTANT

## 2023-05-25 PROCEDURE — 3700000001 HC ADD 15 MINUTES (ANESTHESIA): Performed by: ORTHOPAEDIC SURGERY

## 2023-05-25 PROCEDURE — 6370000000 HC RX 637 (ALT 250 FOR IP): Performed by: ANESTHESIOLOGY

## 2023-05-25 PROCEDURE — 7100000001 HC PACU RECOVERY - ADDTL 15 MIN: Performed by: ORTHOPAEDIC SURGERY

## 2023-05-25 PROCEDURE — 6360000002 HC RX W HCPCS: Performed by: ANESTHESIOLOGY

## 2023-05-25 PROCEDURE — 2580000003 HC RX 258: Performed by: ANESTHESIOLOGY

## 2023-05-25 PROCEDURE — 6370000000 HC RX 637 (ALT 250 FOR IP): Performed by: PHYSICIAN ASSISTANT

## 2023-05-25 PROCEDURE — 2709999900 HC NON-CHARGEABLE SUPPLY: Performed by: ORTHOPAEDIC SURGERY

## 2023-05-25 PROCEDURE — 2720000010 HC SURG SUPPLY STERILE: Performed by: ORTHOPAEDIC SURGERY

## 2023-05-25 PROCEDURE — C1776 JOINT DEVICE (IMPLANTABLE): HCPCS | Performed by: ORTHOPAEDIC SURGERY

## 2023-05-25 PROCEDURE — 7100000000 HC PACU RECOVERY - FIRST 15 MIN: Performed by: ORTHOPAEDIC SURGERY

## 2023-05-25 PROCEDURE — 2500000003 HC RX 250 WO HCPCS: Performed by: ANESTHESIOLOGY

## 2023-05-25 DEVICE — COMPONENT TOT HIP CAPPED LNR POLYETH H2STRYKER] STRYKER CORP]: Type: IMPLANTABLE DEVICE | Site: HIP | Status: FUNCTIONAL

## 2023-05-25 DEVICE — CERAMIC V40 FEMORAL HEAD
Type: IMPLANTABLE DEVICE | Site: HIP | Status: FUNCTIONAL
Brand: BIOLOX

## 2023-05-25 DEVICE — 132 DEGREE NECK ANGLE HIP STEM
Type: IMPLANTABLE DEVICE | Site: HIP | Status: FUNCTIONAL
Brand: ACCOLADE

## 2023-05-25 DEVICE — TRIDENT X3 0 DEGREE POLYETHYLENE INSERT
Type: IMPLANTABLE DEVICE | Site: HIP | Status: FUNCTIONAL
Brand: TRIDENT X3 INSERT

## 2023-05-25 DEVICE — SHELL ACET SZ D DIA48MM 3 CLUS H TRITANIUM PRESSFIT PRI: Type: IMPLANTABLE DEVICE | Site: HIP | Status: FUNCTIONAL

## 2023-05-25 RX ORDER — OXYCODONE HCL 10 MG/1
10 TABLET, FILM COATED, EXTENDED RELEASE ORAL ONCE
Status: COMPLETED | OUTPATIENT
Start: 2023-05-25 | End: 2023-05-25

## 2023-05-25 RX ORDER — SODIUM CHLORIDE 9 MG/ML
INJECTION, SOLUTION INTRAVENOUS CONTINUOUS
Status: DISCONTINUED | OUTPATIENT
Start: 2023-05-25 | End: 2023-05-26

## 2023-05-25 RX ORDER — ACETAMINOPHEN 325 MG/1
650 TABLET ORAL EVERY 6 HOURS PRN
Status: ON HOLD | COMMUNITY
End: 2023-05-30 | Stop reason: HOSPADM

## 2023-05-25 RX ORDER — DULOXETIN HYDROCHLORIDE 30 MG/1
30 CAPSULE, DELAYED RELEASE ORAL DAILY
Status: DISCONTINUED | OUTPATIENT
Start: 2023-05-25 | End: 2023-05-30 | Stop reason: HOSPADM

## 2023-05-25 RX ORDER — OXYCODONE HYDROCHLORIDE 5 MG/1
10 TABLET ORAL EVERY 4 HOURS PRN
Status: DISCONTINUED | OUTPATIENT
Start: 2023-05-25 | End: 2023-05-30 | Stop reason: HOSPADM

## 2023-05-25 RX ORDER — LOPERAMIDE HYDROCHLORIDE 2 MG/1
2 CAPSULE ORAL 4 TIMES DAILY PRN
Status: DISCONTINUED | OUTPATIENT
Start: 2023-05-25 | End: 2023-05-30 | Stop reason: HOSPADM

## 2023-05-25 RX ORDER — DIPHENHYDRAMINE HYDROCHLORIDE 50 MG/ML
12.5 INJECTION INTRAMUSCULAR; INTRAVENOUS
Status: DISCONTINUED | OUTPATIENT
Start: 2023-05-25 | End: 2023-05-25 | Stop reason: HOSPADM

## 2023-05-25 RX ORDER — MIDAZOLAM HYDROCHLORIDE 1 MG/ML
INJECTION INTRAMUSCULAR; INTRAVENOUS PRN
Status: DISCONTINUED | OUTPATIENT
Start: 2023-05-25 | End: 2023-05-25 | Stop reason: SDUPTHER

## 2023-05-25 RX ORDER — SODIUM CHLORIDE 0.9 % (FLUSH) 0.9 %
5-40 SYRINGE (ML) INJECTION PRN
Status: DISCONTINUED | OUTPATIENT
Start: 2023-05-25 | End: 2023-05-30 | Stop reason: HOSPADM

## 2023-05-25 RX ORDER — PHENYLEPHRINE HCL IN 0.9% NACL 0.4MG/10ML
SYRINGE (ML) INTRAVENOUS PRN
Status: DISCONTINUED | OUTPATIENT
Start: 2023-05-25 | End: 2023-05-25 | Stop reason: SDUPTHER

## 2023-05-25 RX ORDER — FAMOTIDINE 10 MG/ML
INJECTION, SOLUTION INTRAVENOUS PRN
Status: DISCONTINUED | OUTPATIENT
Start: 2023-05-25 | End: 2023-05-25 | Stop reason: SDUPTHER

## 2023-05-25 RX ORDER — FAMOTIDINE 20 MG/1
20 TABLET, FILM COATED ORAL 2 TIMES DAILY
Status: DISCONTINUED | OUTPATIENT
Start: 2023-05-25 | End: 2023-05-26

## 2023-05-25 RX ORDER — BISACODYL 5 MG/1
5 TABLET, DELAYED RELEASE ORAL DAILY PRN
Status: DISCONTINUED | OUTPATIENT
Start: 2023-05-25 | End: 2023-05-30 | Stop reason: HOSPADM

## 2023-05-25 RX ORDER — SCOLOPAMINE TRANSDERMAL SYSTEM 1 MG/1
1 PATCH, EXTENDED RELEASE TRANSDERMAL
Status: DISCONTINUED | OUTPATIENT
Start: 2023-05-25 | End: 2023-05-25 | Stop reason: HOSPADM

## 2023-05-25 RX ORDER — OXYCODONE HYDROCHLORIDE 5 MG/1
10 TABLET ORAL
Status: DISCONTINUED | OUTPATIENT
Start: 2023-05-25 | End: 2023-05-25

## 2023-05-25 RX ORDER — BUPIVACAINE HYDROCHLORIDE 5 MG/ML
INJECTION, SOLUTION EPIDURAL; INTRACAUDAL PRN
Status: DISCONTINUED | OUTPATIENT
Start: 2023-05-25 | End: 2023-05-25 | Stop reason: SDUPTHER

## 2023-05-25 RX ORDER — CARVEDILOL 12.5 MG/1
25 TABLET ORAL 2 TIMES DAILY WITH MEALS
Status: DISCONTINUED | OUTPATIENT
Start: 2023-05-25 | End: 2023-05-30 | Stop reason: HOSPADM

## 2023-05-25 RX ORDER — DEXAMETHASONE SODIUM PHOSPHATE 4 MG/ML
INJECTION, SOLUTION INTRA-ARTICULAR; INTRALESIONAL; INTRAMUSCULAR; INTRAVENOUS; SOFT TISSUE PRN
Status: DISCONTINUED | OUTPATIENT
Start: 2023-05-25 | End: 2023-05-25 | Stop reason: SDUPTHER

## 2023-05-25 RX ORDER — ACETAMINOPHEN 325 MG/1
650 TABLET ORAL EVERY 6 HOURS SCHEDULED
Status: DISCONTINUED | OUTPATIENT
Start: 2023-05-25 | End: 2023-05-30 | Stop reason: HOSPADM

## 2023-05-25 RX ORDER — LOPERAMIDE HYDROCHLORIDE 2 MG/1
2 CAPSULE ORAL 4 TIMES DAILY PRN
COMMUNITY

## 2023-05-25 RX ORDER — ONDANSETRON 2 MG/ML
INJECTION INTRAMUSCULAR; INTRAVENOUS PRN
Status: DISCONTINUED | OUTPATIENT
Start: 2023-05-25 | End: 2023-05-25 | Stop reason: SDUPTHER

## 2023-05-25 RX ORDER — DIPHENHYDRAMINE HCL 25 MG
25 CAPSULE ORAL EVERY 6 HOURS PRN
Status: DISCONTINUED | OUTPATIENT
Start: 2023-05-25 | End: 2023-05-30 | Stop reason: HOSPADM

## 2023-05-25 RX ORDER — BISACODYL 10 MG
10 SUPPOSITORY, RECTAL RECTAL DAILY PRN
Status: DISCONTINUED | OUTPATIENT
Start: 2023-05-25 | End: 2023-05-30 | Stop reason: HOSPADM

## 2023-05-25 RX ORDER — TRANEXAMIC ACID 100 MG/ML
INJECTION, SOLUTION INTRAVENOUS PRN
Status: DISCONTINUED | OUTPATIENT
Start: 2023-05-25 | End: 2023-05-25 | Stop reason: SDUPTHER

## 2023-05-25 RX ORDER — BUSPIRONE HYDROCHLORIDE 10 MG/1
10 TABLET ORAL 2 TIMES DAILY
Status: DISCONTINUED | OUTPATIENT
Start: 2023-05-25 | End: 2023-05-30 | Stop reason: HOSPADM

## 2023-05-25 RX ORDER — ASPIRIN 81 MG/1
81 TABLET ORAL 2 TIMES DAILY
Status: DISCONTINUED | OUTPATIENT
Start: 2023-05-25 | End: 2023-05-30 | Stop reason: HOSPADM

## 2023-05-25 RX ORDER — PROPOFOL 10 MG/ML
INJECTION, EMULSION INTRAVENOUS PRN
Status: DISCONTINUED | OUTPATIENT
Start: 2023-05-25 | End: 2023-05-25 | Stop reason: SDUPTHER

## 2023-05-25 RX ORDER — ACETAMINOPHEN 325 MG/1
975 TABLET ORAL ONCE
Status: COMPLETED | OUTPATIENT
Start: 2023-05-25 | End: 2023-05-25

## 2023-05-25 RX ORDER — LIDOCAINE HYDROCHLORIDE 20 MG/ML
INJECTION, SOLUTION EPIDURAL; INFILTRATION; INTRACAUDAL; PERINEURAL PRN
Status: DISCONTINUED | OUTPATIENT
Start: 2023-05-25 | End: 2023-05-25 | Stop reason: SDUPTHER

## 2023-05-25 RX ORDER — SODIUM CHLORIDE 9 MG/ML
INJECTION, SOLUTION INTRAVENOUS PRN
Status: DISCONTINUED | OUTPATIENT
Start: 2023-05-25 | End: 2023-05-30 | Stop reason: HOSPADM

## 2023-05-25 RX ORDER — 0.9 % SODIUM CHLORIDE 0.9 %
500 INTRAVENOUS SOLUTION INTRAVENOUS ONCE
Status: DISCONTINUED | OUTPATIENT
Start: 2023-05-25 | End: 2023-05-30 | Stop reason: HOSPADM

## 2023-05-25 RX ORDER — TRAZODONE HYDROCHLORIDE 50 MG/1
50 TABLET ORAL NIGHTLY
Status: DISCONTINUED | OUTPATIENT
Start: 2023-05-25 | End: 2023-05-30 | Stop reason: HOSPADM

## 2023-05-25 RX ORDER — DIPHENHYDRAMINE HYDROCHLORIDE 50 MG/ML
25 INJECTION INTRAMUSCULAR; INTRAVENOUS EVERY 6 HOURS PRN
Status: DISCONTINUED | OUTPATIENT
Start: 2023-05-25 | End: 2023-05-30 | Stop reason: HOSPADM

## 2023-05-25 RX ORDER — LIDOCAINE HYDROCHLORIDE 10 MG/ML
1 INJECTION, SOLUTION EPIDURAL; INFILTRATION; INTRACAUDAL; PERINEURAL
Status: DISCONTINUED | OUTPATIENT
Start: 2023-05-25 | End: 2023-05-25 | Stop reason: HOSPADM

## 2023-05-25 RX ORDER — ONDANSETRON 2 MG/ML
4 INJECTION INTRAMUSCULAR; INTRAVENOUS
Status: DISCONTINUED | OUTPATIENT
Start: 2023-05-25 | End: 2023-05-25 | Stop reason: HOSPADM

## 2023-05-25 RX ORDER — LEVOTHYROXINE SODIUM 0.05 MG/1
50 TABLET ORAL
Status: DISCONTINUED | OUTPATIENT
Start: 2023-05-26 | End: 2023-05-30 | Stop reason: HOSPADM

## 2023-05-25 RX ORDER — TRAMADOL HYDROCHLORIDE 50 MG/1
50 TABLET ORAL EVERY 6 HOURS PRN
Qty: 30 TABLET | Refills: 0 | Status: SHIPPED | OUTPATIENT
Start: 2023-05-25 | End: 2023-05-30 | Stop reason: SDUPTHER

## 2023-05-25 RX ORDER — VITAMIN B COMPLEX
2000 TABLET ORAL DAILY
Status: DISCONTINUED | OUTPATIENT
Start: 2023-05-26 | End: 2023-05-30 | Stop reason: HOSPADM

## 2023-05-25 RX ORDER — ACETAMINOPHEN 325 MG/1
975 TABLET ORAL ONCE
Status: DISCONTINUED | OUTPATIENT
Start: 2023-05-25 | End: 2023-05-25 | Stop reason: SDUPTHER

## 2023-05-25 RX ORDER — CHOLECALCIFEROL (VITAMIN D3) 125 MCG
1000 CAPSULE ORAL DAILY
Status: DISCONTINUED | OUTPATIENT
Start: 2023-05-26 | End: 2023-05-30 | Stop reason: HOSPADM

## 2023-05-25 RX ORDER — ATORVASTATIN CALCIUM 10 MG/1
10 TABLET, FILM COATED ORAL
Status: DISCONTINUED | OUTPATIENT
Start: 2023-05-25 | End: 2023-05-30 | Stop reason: HOSPADM

## 2023-05-25 RX ORDER — SENNA AND DOCUSATE SODIUM 50; 8.6 MG/1; MG/1
1 TABLET, FILM COATED ORAL 2 TIMES DAILY
Status: DISCONTINUED | OUTPATIENT
Start: 2023-05-25 | End: 2023-05-30 | Stop reason: HOSPADM

## 2023-05-25 RX ORDER — ACETAMINOPHEN 325 MG/1
650 TABLET ORAL EVERY 4 HOURS
Qty: 120 TABLET | Refills: 1 | Status: SHIPPED | OUTPATIENT
Start: 2023-05-25 | End: 2023-05-30 | Stop reason: SDUPTHER

## 2023-05-25 RX ORDER — ONDANSETRON 4 MG/1
4 TABLET, ORALLY DISINTEGRATING ORAL EVERY 8 HOURS PRN
Status: DISCONTINUED | OUTPATIENT
Start: 2023-05-25 | End: 2023-05-30 | Stop reason: HOSPADM

## 2023-05-25 RX ORDER — OXYCODONE HYDROCHLORIDE 5 MG/1
5 TABLET ORAL EVERY 4 HOURS PRN
Qty: 30 TABLET | Refills: 0 | Status: SHIPPED | OUTPATIENT
Start: 2023-05-25 | End: 2023-05-30 | Stop reason: SDUPTHER

## 2023-05-25 RX ORDER — SODIUM CHLORIDE, SODIUM LACTATE, POTASSIUM CHLORIDE, CALCIUM CHLORIDE 600; 310; 30; 20 MG/100ML; MG/100ML; MG/100ML; MG/100ML
INJECTION, SOLUTION INTRAVENOUS CONTINUOUS
Status: DISCONTINUED | OUTPATIENT
Start: 2023-05-25 | End: 2023-05-26

## 2023-05-25 RX ORDER — ONDANSETRON 2 MG/ML
4 INJECTION INTRAMUSCULAR; INTRAVENOUS EVERY 6 HOURS PRN
Status: DISCONTINUED | OUTPATIENT
Start: 2023-05-25 | End: 2023-05-30 | Stop reason: HOSPADM

## 2023-05-25 RX ORDER — OXYCODONE HYDROCHLORIDE 5 MG/1
5 TABLET ORAL EVERY 4 HOURS PRN
Status: DISCONTINUED | OUTPATIENT
Start: 2023-05-25 | End: 2023-05-30 | Stop reason: HOSPADM

## 2023-05-25 RX ORDER — ASPIRIN 81 MG/1
81 TABLET ORAL 2 TIMES DAILY
Qty: 60 TABLET | Refills: 3 | Status: SHIPPED | OUTPATIENT
Start: 2023-05-25 | End: 2023-05-30 | Stop reason: SDUPTHER

## 2023-05-25 RX ORDER — SODIUM CHLORIDE 0.9 % (FLUSH) 0.9 %
5-40 SYRINGE (ML) INJECTION EVERY 12 HOURS SCHEDULED
Status: DISCONTINUED | OUTPATIENT
Start: 2023-05-25 | End: 2023-05-30 | Stop reason: HOSPADM

## 2023-05-25 RX ORDER — IBUPROFEN 800 MG/1
800 TABLET ORAL EVERY 6 HOURS
Qty: 60 TABLET | Refills: 0 | Status: SHIPPED
Start: 2023-05-25 | End: 2023-05-30 | Stop reason: HOSPADM

## 2023-05-25 RX ORDER — SODIUM CHLORIDE, SODIUM LACTATE, POTASSIUM CHLORIDE, CALCIUM CHLORIDE 600; 310; 30; 20 MG/100ML; MG/100ML; MG/100ML; MG/100ML
INJECTION, SOLUTION INTRAVENOUS CONTINUOUS
Status: DISCONTINUED | OUTPATIENT
Start: 2023-05-25 | End: 2023-05-25 | Stop reason: HOSPADM

## 2023-05-25 RX ORDER — PREGABALIN 75 MG/1
75 CAPSULE ORAL ONCE
Status: COMPLETED | OUTPATIENT
Start: 2023-05-25 | End: 2023-05-25

## 2023-05-25 RX ORDER — FENTANYL CITRATE 50 UG/ML
INJECTION, SOLUTION INTRAMUSCULAR; INTRAVENOUS PRN
Status: DISCONTINUED | OUTPATIENT
Start: 2023-05-25 | End: 2023-05-25 | Stop reason: SDUPTHER

## 2023-05-25 RX ORDER — OXYCODONE HCL 10 MG/1
10 TABLET, FILM COATED, EXTENDED RELEASE ORAL EVERY 12 HOURS SCHEDULED
Status: DISCONTINUED | OUTPATIENT
Start: 2023-05-25 | End: 2023-05-25

## 2023-05-25 RX ORDER — OXYCODONE HYDROCHLORIDE 5 MG/1
10 TABLET ORAL
Status: DISCONTINUED | OUTPATIENT
Start: 2023-05-25 | End: 2023-05-25 | Stop reason: HOSPADM

## 2023-05-25 RX ADMIN — SODIUM CHLORIDE: 9 INJECTION, SOLUTION INTRAVENOUS at 18:40

## 2023-05-25 RX ADMIN — PREGABALIN 75 MG: 75 CAPSULE ORAL at 11:23

## 2023-05-25 RX ADMIN — ASPIRIN 81 MG: 81 TABLET, COATED ORAL at 21:16

## 2023-05-25 RX ADMIN — Medication 80 MCG: at 12:40

## 2023-05-25 RX ADMIN — OXYCODONE HYDROCHLORIDE 10 MG: 10 TABLET, FILM COATED, EXTENDED RELEASE ORAL at 11:23

## 2023-05-25 RX ADMIN — TRANEXAMIC ACID 1000 MG: 100 INJECTION, SOLUTION INTRAVENOUS at 14:15

## 2023-05-25 RX ADMIN — BUSPIRONE HYDROCHLORIDE 10 MG: 10 TABLET ORAL at 21:20

## 2023-05-25 RX ADMIN — FENTANYL CITRATE 50 MCG: 50 INJECTION, SOLUTION INTRAMUSCULAR; INTRAVENOUS at 12:06

## 2023-05-25 RX ADMIN — LIDOCAINE HYDROCHLORIDE 60 MG: 20 INJECTION, SOLUTION EPIDURAL; INFILTRATION; INTRACAUDAL; PERINEURAL at 12:35

## 2023-05-25 RX ADMIN — ONDANSETRON HYDROCHLORIDE 4 MG: 2 SOLUTION INTRAMUSCULAR; INTRAVENOUS at 14:05

## 2023-05-25 RX ADMIN — Medication 40 MCG: at 13:14

## 2023-05-25 RX ADMIN — FAMOTIDINE 20 MG: 10 INJECTION, SOLUTION INTRAVENOUS at 21:15

## 2023-05-25 RX ADMIN — ATORVASTATIN CALCIUM 10 MG: 10 TABLET, FILM COATED ORAL at 21:16

## 2023-05-25 RX ADMIN — TRANEXAMIC ACID 1000 MG: 100 INJECTION, SOLUTION INTRAVENOUS at 12:55

## 2023-05-25 RX ADMIN — ACETAMINOPHEN 650 MG: 325 TABLET ORAL at 18:38

## 2023-05-25 RX ADMIN — CEFAZOLIN SODIUM 2000 MG: 1 POWDER, FOR SOLUTION INTRAMUSCULAR; INTRAVENOUS at 12:45

## 2023-05-25 RX ADMIN — MIDAZOLAM HYDROCHLORIDE 2 MG: 1 INJECTION, SOLUTION INTRAMUSCULAR; INTRAVENOUS at 12:01

## 2023-05-25 RX ADMIN — CARVEDILOL 25 MG: 12.5 TABLET, FILM COATED ORAL at 18:38

## 2023-05-25 RX ADMIN — FAMOTIDINE 20 MG: 10 INJECTION INTRAVENOUS at 12:45

## 2023-05-25 RX ADMIN — Medication 80 MCG: at 12:45

## 2023-05-25 RX ADMIN — DEXAMETHASONE SODIUM PHOSPHATE 8 MG: 4 INJECTION, SOLUTION INTRAMUSCULAR; INTRAVENOUS at 12:45

## 2023-05-25 RX ADMIN — SODIUM CHLORIDE, POTASSIUM CHLORIDE, SODIUM LACTATE AND CALCIUM CHLORIDE: 600; 310; 30; 20 INJECTION, SOLUTION INTRAVENOUS at 12:30

## 2023-05-25 RX ADMIN — TRAZODONE HYDROCHLORIDE 50 MG: 50 TABLET ORAL at 21:16

## 2023-05-25 RX ADMIN — Medication 80 MCG: at 12:50

## 2023-05-25 RX ADMIN — Medication 80 MCG: at 13:20

## 2023-05-25 RX ADMIN — FENTANYL CITRATE 50 MCG: 50 INJECTION, SOLUTION INTRAMUSCULAR; INTRAVENOUS at 12:01

## 2023-05-25 RX ADMIN — SODIUM CHLORIDE, PRESERVATIVE FREE 10 ML: 5 INJECTION INTRAVENOUS at 21:16

## 2023-05-25 RX ADMIN — PROPOFOL 50 MG: 10 INJECTION, EMULSION INTRAVENOUS at 12:35

## 2023-05-25 RX ADMIN — Medication 40 MCG: at 13:00

## 2023-05-25 RX ADMIN — BUPIVACAINE HYDROCHLORIDE 2.4 ML: 5 INJECTION, SOLUTION EPIDURAL; INTRACAUDAL; PERINEURAL at 12:11

## 2023-05-25 RX ADMIN — CEFAZOLIN 2000 MG: 1 INJECTION, POWDER, FOR SOLUTION INTRAMUSCULAR; INTRAVENOUS at 21:15

## 2023-05-25 RX ADMIN — ACETAMINOPHEN 975 MG: 325 TABLET ORAL at 11:23

## 2023-05-25 RX ADMIN — Medication 120 MCG: at 14:05

## 2023-05-25 RX ADMIN — SENNOSIDES AND DOCUSATE SODIUM 1 TABLET: 50; 8.6 TABLET ORAL at 21:16

## 2023-05-25 RX ADMIN — ACETAMINOPHEN 650 MG: 325 TABLET ORAL at 23:36

## 2023-05-25 ASSESSMENT — PAIN SCALES - GENERAL
PAINLEVEL_OUTOF10: 0

## 2023-05-25 NOTE — ANESTHESIA PROCEDURE NOTES
Spinal Block    Patient location during procedure: pre-op  End time: 5/25/2023 12:11 PM  Reason for block: post-op pain management, primary anesthetic and at surgeon's request  Staffing  Performed: resident/CRNA   Resident/CRNA: JESÚS Rollins CRNA  Spinal Block  Patient position: sitting  Prep: DuraPrep  Patient monitoring: cardiac monitor, continuous pulse ox, continuous capnometry, frequent blood pressure checks and oxygen  Approach: midline  Location: L3/L4  Provider prep: mask and sterile gloves  Needle  Needle type: Pencan   Needle gauge: 25 G  Assessment  Swirl obtained: Yes  CSF: clear  Attempts: 2  Hemodynamics: stable  Preanesthetic Checklist  Completed: patient identified, IV checked, site marked, risks and benefits discussed, surgical/procedural consents, pre-op evaluation, timeout performed, anesthesia consent given, oxygen available and monitors applied/VS acknowledged

## 2023-05-25 NOTE — ANESTHESIA PRE PROCEDURE
limited because of back pain and hip pain     Neuro/Psych:   (+) psychiatric history: stable with treatmentdepression/anxiety              ROS comment: Neuropathy both feet  Recovering alcoholic GI/Hepatic/Renal: Neg GI/Hepatic/Renal ROS            Endo/Other: Negative Endo/Other ROS                    Abdominal: normal exam            Vascular: Other Findings:           Anesthesia Plan      spinal     ASA 3             Anesthetic plan and risks discussed with patient. Plan discussed with CRNA.                     Meryl Figueroa MD   5/25/2023

## 2023-05-26 ENCOUNTER — APPOINTMENT (OUTPATIENT)
Facility: HOSPITAL | Age: 68
End: 2023-05-26
Attending: ORTHOPAEDIC SURGERY
Payer: MEDICARE

## 2023-05-26 PROBLEM — F10.939 ALCOHOL WITHDRAWAL (HCC): Status: ACTIVE | Noted: 2023-05-26

## 2023-05-26 PROBLEM — N17.9 AKI (ACUTE KIDNEY INJURY) (HCC): Status: ACTIVE | Noted: 2018-03-03

## 2023-05-26 LAB
ANION GAP SERPL CALC-SCNC: 11 MMOL/L (ref 5–15)
ANION GAP SERPL CALC-SCNC: 8 MMOL/L (ref 5–15)
APPEARANCE UR: CLEAR
APPEARANCE UR: CLEAR
BACTERIA URNS QL MICRO: NEGATIVE /HPF
BACTERIA URNS QL MICRO: NEGATIVE /HPF
BILIRUB UR QL: NEGATIVE
BILIRUB UR QL: NEGATIVE
BUN SERPL-MCNC: 22 MG/DL (ref 6–20)
BUN SERPL-MCNC: 24 MG/DL (ref 6–20)
BUN/CREAT SERPL: 8 (ref 12–20)
BUN/CREAT SERPL: 9 (ref 12–20)
CALCIUM SERPL-MCNC: 7.9 MG/DL (ref 8.5–10.1)
CALCIUM SERPL-MCNC: 8 MG/DL (ref 8.5–10.1)
CHLORIDE SERPL-SCNC: 107 MMOL/L (ref 97–108)
CHLORIDE SERPL-SCNC: 110 MMOL/L (ref 97–108)
CO2 SERPL-SCNC: 19 MMOL/L (ref 21–32)
CO2 SERPL-SCNC: 22 MMOL/L (ref 21–32)
COLOR UR: NORMAL
COLOR UR: NORMAL
CREAT SERPL-MCNC: 2.74 MG/DL (ref 0.55–1.02)
CREAT SERPL-MCNC: 2.82 MG/DL (ref 0.55–1.02)
CREAT UR-MCNC: 64 MG/DL
EPITH CASTS URNS QL MICRO: NORMAL /LPF
EPITH CASTS URNS QL MICRO: NORMAL /LPF
GLUCOSE SERPL-MCNC: 159 MG/DL (ref 65–100)
GLUCOSE SERPL-MCNC: 175 MG/DL (ref 65–100)
GLUCOSE UR STRIP.AUTO-MCNC: NEGATIVE MG/DL
GLUCOSE UR STRIP.AUTO-MCNC: NEGATIVE MG/DL
HCT VFR BLD AUTO: 28.3 % (ref 35–47)
HGB BLD-MCNC: 9.4 G/DL (ref 11.5–16)
HGB UR QL STRIP: NEGATIVE
HGB UR QL STRIP: NEGATIVE
HYALINE CASTS URNS QL MICRO: NORMAL /LPF (ref 0–2)
HYALINE CASTS URNS QL MICRO: NORMAL /LPF (ref 0–2)
KETONES UR QL STRIP.AUTO: NEGATIVE MG/DL
KETONES UR QL STRIP.AUTO: NEGATIVE MG/DL
LEUKOCYTE ESTERASE UR QL STRIP.AUTO: NEGATIVE
LEUKOCYTE ESTERASE UR QL STRIP.AUTO: NEGATIVE
NITRITE UR QL STRIP.AUTO: NEGATIVE
NITRITE UR QL STRIP.AUTO: NEGATIVE
PH UR STRIP: 5.5 (ref 5–8)
PH UR STRIP: 5.5 (ref 5–8)
POTASSIUM SERPL-SCNC: 3.5 MMOL/L (ref 3.5–5.1)
POTASSIUM SERPL-SCNC: 4.1 MMOL/L (ref 3.5–5.1)
PROT UR STRIP-MCNC: NEGATIVE MG/DL
PROT UR STRIP-MCNC: NEGATIVE MG/DL
RBC #/AREA URNS HPF: NORMAL /HPF (ref 0–5)
RBC #/AREA URNS HPF: NORMAL /HPF (ref 0–5)
SODIUM SERPL-SCNC: 137 MMOL/L (ref 136–145)
SODIUM SERPL-SCNC: 140 MMOL/L (ref 136–145)
SODIUM UR-SCNC: 39 MMOL/L
SP GR UR REFRACTOMETRY: 1.01 (ref 1–1.03)
SP GR UR REFRACTOMETRY: 1.01 (ref 1–1.03)
SPECIMEN HOLD: NORMAL
URINE CULTURE IF INDICATED: NORMAL
UROBILINOGEN UR QL STRIP.AUTO: 0.2 EU/DL (ref 0.2–1)
UROBILINOGEN UR QL STRIP.AUTO: 0.2 EU/DL (ref 0.2–1)
UUN UR-MCNC: 265 MG/DL
WBC URNS QL MICRO: NORMAL /HPF (ref 0–4)
WBC URNS QL MICRO: NORMAL /HPF (ref 0–4)

## 2023-05-26 PROCEDURE — 97116 GAIT TRAINING THERAPY: CPT

## 2023-05-26 PROCEDURE — 6370000000 HC RX 637 (ALT 250 FOR IP): Performed by: INTERNAL MEDICINE

## 2023-05-26 PROCEDURE — 6360000002 HC RX W HCPCS: Performed by: PHYSICIAN ASSISTANT

## 2023-05-26 PROCEDURE — 84540 ASSAY OF URINE/UREA-N: CPT

## 2023-05-26 PROCEDURE — 6370000000 HC RX 637 (ALT 250 FOR IP): Performed by: PHYSICIAN ASSISTANT

## 2023-05-26 PROCEDURE — 2580000003 HC RX 258: Performed by: INTERNAL MEDICINE

## 2023-05-26 PROCEDURE — G0378 HOSPITAL OBSERVATION PER HR: HCPCS

## 2023-05-26 PROCEDURE — 36415 COLL VENOUS BLD VENIPUNCTURE: CPT

## 2023-05-26 PROCEDURE — 85014 HEMATOCRIT: CPT

## 2023-05-26 PROCEDURE — 80048 BASIC METABOLIC PNL TOTAL CA: CPT

## 2023-05-26 PROCEDURE — 85018 HEMOGLOBIN: CPT

## 2023-05-26 PROCEDURE — 97161 PT EVAL LOW COMPLEX 20 MIN: CPT

## 2023-05-26 PROCEDURE — 2580000003 HC RX 258: Performed by: PHYSICIAN ASSISTANT

## 2023-05-26 PROCEDURE — 84300 ASSAY OF URINE SODIUM: CPT

## 2023-05-26 PROCEDURE — 81001 URINALYSIS AUTO W/SCOPE: CPT

## 2023-05-26 PROCEDURE — 82570 ASSAY OF URINE CREATININE: CPT

## 2023-05-26 PROCEDURE — 76770 US EXAM ABDO BACK WALL COMP: CPT

## 2023-05-26 RX ORDER — SODIUM CHLORIDE, SODIUM LACTATE, POTASSIUM CHLORIDE, CALCIUM CHLORIDE 600; 310; 30; 20 MG/100ML; MG/100ML; MG/100ML; MG/100ML
INJECTION, SOLUTION INTRAVENOUS CONTINUOUS
Status: DISCONTINUED | OUTPATIENT
Start: 2023-05-26 | End: 2023-05-29

## 2023-05-26 RX ORDER — LORAZEPAM 1 MG/1
4 TABLET ORAL
Status: DISCONTINUED | OUTPATIENT
Start: 2023-05-26 | End: 2023-05-30 | Stop reason: HOSPADM

## 2023-05-26 RX ORDER — HYDRALAZINE HYDROCHLORIDE 20 MG/ML
20 INJECTION INTRAMUSCULAR; INTRAVENOUS EVERY 6 HOURS PRN
Status: DISCONTINUED | OUTPATIENT
Start: 2023-05-26 | End: 2023-05-29

## 2023-05-26 RX ORDER — LORAZEPAM 1 MG/1
2 TABLET ORAL
Status: DISCONTINUED | OUTPATIENT
Start: 2023-05-26 | End: 2023-05-30 | Stop reason: HOSPADM

## 2023-05-26 RX ORDER — MULTIVITAMIN WITH IRON
1 TABLET ORAL DAILY
Status: DISCONTINUED | OUTPATIENT
Start: 2023-05-26 | End: 2023-05-30 | Stop reason: HOSPADM

## 2023-05-26 RX ORDER — GAUZE BANDAGE 2" X 2"
100 BANDAGE TOPICAL DAILY
Status: DISCONTINUED | OUTPATIENT
Start: 2023-05-26 | End: 2023-05-30 | Stop reason: HOSPADM

## 2023-05-26 RX ORDER — LORAZEPAM 1 MG/1
3 TABLET ORAL
Status: DISCONTINUED | OUTPATIENT
Start: 2023-05-26 | End: 2023-05-30 | Stop reason: HOSPADM

## 2023-05-26 RX ORDER — LORAZEPAM 1 MG/1
1 TABLET ORAL
Status: DISCONTINUED | OUTPATIENT
Start: 2023-05-26 | End: 2023-05-30 | Stop reason: HOSPADM

## 2023-05-26 RX ORDER — LORAZEPAM 2 MG/ML
1 INJECTION INTRAMUSCULAR
Status: DISCONTINUED | OUTPATIENT
Start: 2023-05-26 | End: 2023-05-30 | Stop reason: HOSPADM

## 2023-05-26 RX ORDER — FOLIC ACID 1 MG/1
1 TABLET ORAL DAILY
Status: DISCONTINUED | OUTPATIENT
Start: 2023-05-26 | End: 2023-05-30 | Stop reason: HOSPADM

## 2023-05-26 RX ORDER — PHENOBARBITAL 32.4 MG/1
32.4 TABLET ORAL 2 TIMES DAILY
Status: DISCONTINUED | OUTPATIENT
Start: 2023-05-26 | End: 2023-05-28

## 2023-05-26 RX ORDER — LORAZEPAM 2 MG/ML
3 INJECTION INTRAMUSCULAR
Status: DISCONTINUED | OUTPATIENT
Start: 2023-05-26 | End: 2023-05-30 | Stop reason: HOSPADM

## 2023-05-26 RX ORDER — LORAZEPAM 2 MG/ML
2 INJECTION INTRAMUSCULAR
Status: DISCONTINUED | OUTPATIENT
Start: 2023-05-26 | End: 2023-05-30 | Stop reason: HOSPADM

## 2023-05-26 RX ORDER — FAMOTIDINE 20 MG/1
20 TABLET, FILM COATED ORAL DAILY
Status: DISCONTINUED | OUTPATIENT
Start: 2023-05-27 | End: 2023-05-30 | Stop reason: HOSPADM

## 2023-05-26 RX ORDER — LORAZEPAM 2 MG/ML
4 INJECTION INTRAMUSCULAR
Status: DISCONTINUED | OUTPATIENT
Start: 2023-05-26 | End: 2023-05-30 | Stop reason: HOSPADM

## 2023-05-26 RX ADMIN — TRAZODONE HYDROCHLORIDE 50 MG: 50 TABLET ORAL at 21:17

## 2023-05-26 RX ADMIN — CARVEDILOL 25 MG: 12.5 TABLET, FILM COATED ORAL at 08:44

## 2023-05-26 RX ADMIN — CEFAZOLIN 2000 MG: 1 INJECTION, POWDER, FOR SOLUTION INTRAMUSCULAR; INTRAVENOUS at 05:10

## 2023-05-26 RX ADMIN — OXYCODONE 5 MG: 5 TABLET ORAL at 21:18

## 2023-05-26 RX ADMIN — SENNOSIDES AND DOCUSATE SODIUM 1 TABLET: 50; 8.6 TABLET ORAL at 08:45

## 2023-05-26 RX ADMIN — ASPIRIN 81 MG: 81 TABLET, COATED ORAL at 21:17

## 2023-05-26 RX ADMIN — CARVEDILOL 25 MG: 12.5 TABLET, FILM COATED ORAL at 17:26

## 2023-05-26 RX ADMIN — SODIUM CHLORIDE, SODIUM LACTATE, POTASSIUM CHLORIDE, AND CALCIUM CHLORIDE: 600; 310; 30; 20 INJECTION, SOLUTION INTRAVENOUS at 23:23

## 2023-05-26 RX ADMIN — SODIUM CHLORIDE, PRESERVATIVE FREE 10 ML: 5 INJECTION INTRAVENOUS at 08:46

## 2023-05-26 RX ADMIN — SENNOSIDES AND DOCUSATE SODIUM 1 TABLET: 50; 8.6 TABLET ORAL at 21:18

## 2023-05-26 RX ADMIN — ATORVASTATIN CALCIUM 10 MG: 10 TABLET, FILM COATED ORAL at 21:17

## 2023-05-26 RX ADMIN — OXYCODONE 5 MG: 5 TABLET ORAL at 08:51

## 2023-05-26 RX ADMIN — LEVOTHYROXINE SODIUM 50 MCG: 0.05 TABLET ORAL at 05:09

## 2023-05-26 RX ADMIN — PHENOBARBITAL 32.4 MG: 32.4 TABLET ORAL at 21:18

## 2023-05-26 RX ADMIN — ACETAMINOPHEN 650 MG: 325 TABLET ORAL at 23:23

## 2023-05-26 RX ADMIN — BUSPIRONE HYDROCHLORIDE 10 MG: 10 TABLET ORAL at 21:17

## 2023-05-26 RX ADMIN — ASPIRIN 81 MG: 81 TABLET, COATED ORAL at 08:44

## 2023-05-26 RX ADMIN — ACETAMINOPHEN 650 MG: 325 TABLET ORAL at 05:09

## 2023-05-26 RX ADMIN — FOLIC ACID 1 MG: 1 TABLET ORAL at 12:05

## 2023-05-26 RX ADMIN — Medication 100 MG: at 12:06

## 2023-05-26 RX ADMIN — FAMOTIDINE 20 MG: 20 TABLET ORAL at 08:45

## 2023-05-26 RX ADMIN — PHENOBARBITAL 32.4 MG: 32.4 TABLET ORAL at 12:06

## 2023-05-26 RX ADMIN — THERA TABS 1 TABLET: TAB at 12:05

## 2023-05-26 RX ADMIN — SODIUM CHLORIDE, SODIUM LACTATE, POTASSIUM CHLORIDE, AND CALCIUM CHLORIDE: 600; 310; 30; 20 INJECTION, SOLUTION INTRAVENOUS at 10:36

## 2023-05-26 RX ADMIN — DULOXETINE HYDROCHLORIDE 30 MG: 30 CAPSULE, DELAYED RELEASE ORAL at 08:45

## 2023-05-26 RX ADMIN — ACETAMINOPHEN 650 MG: 325 TABLET ORAL at 17:26

## 2023-05-26 RX ADMIN — BUSPIRONE HYDROCHLORIDE 10 MG: 10 TABLET ORAL at 08:44

## 2023-05-26 RX ADMIN — ACETAMINOPHEN 650 MG: 325 TABLET ORAL at 12:06

## 2023-05-26 ASSESSMENT — PAIN SCALES - GENERAL
PAINLEVEL_OUTOF10: 2
PAINLEVEL_OUTOF10: 6
PAINLEVEL_OUTOF10: 4
PAINLEVEL_OUTOF10: 6

## 2023-05-26 ASSESSMENT — PAIN DESCRIPTION - LOCATION
LOCATION: BACK
LOCATION: HIP
LOCATION: HIP

## 2023-05-26 ASSESSMENT — PAIN DESCRIPTION - DESCRIPTORS: DESCRIPTORS: ACHING

## 2023-05-26 ASSESSMENT — PAIN DESCRIPTION - ORIENTATION: ORIENTATION: LEFT

## 2023-05-27 PROBLEM — I10 HTN (HYPERTENSION), BENIGN: Status: ACTIVE | Noted: 2018-03-03

## 2023-05-27 PROBLEM — E87.6 HYPOKALEMIA: Status: ACTIVE | Noted: 2018-03-03

## 2023-05-27 PROBLEM — F41.8 DEPRESSION WITH ANXIETY: Status: ACTIVE | Noted: 2018-03-03

## 2023-05-27 PROBLEM — E83.42 HYPOMAGNESEMIA: Status: ACTIVE | Noted: 2023-05-27

## 2023-05-27 LAB
ALBUMIN SERPL-MCNC: 2.8 G/DL (ref 3.5–5)
ALBUMIN/GLOB SERPL: 0.8 (ref 1.1–2.2)
ALP SERPL-CCNC: 64 U/L (ref 45–117)
ALT SERPL-CCNC: 13 U/L (ref 12–78)
AMMONIA PLAS-SCNC: 29 UMOL/L
ANION GAP SERPL CALC-SCNC: 7 MMOL/L (ref 5–15)
AST SERPL-CCNC: 43 U/L (ref 15–37)
BILIRUB SERPL-MCNC: 0.3 MG/DL (ref 0.2–1)
BUN SERPL-MCNC: 23 MG/DL (ref 6–20)
BUN/CREAT SERPL: 10 (ref 12–20)
CALCIUM SERPL-MCNC: 7.8 MG/DL (ref 8.5–10.1)
CHLORIDE SERPL-SCNC: 107 MMOL/L (ref 97–108)
CO2 SERPL-SCNC: 24 MMOL/L (ref 21–32)
CREAT SERPL-MCNC: 2.34 MG/DL (ref 0.55–1.02)
ERYTHROCYTE [DISTWIDTH] IN BLOOD BY AUTOMATED COUNT: 14.2 % (ref 11.5–14.5)
GLOBULIN SER CALC-MCNC: 3.4 G/DL (ref 2–4)
GLUCOSE SERPL-MCNC: 110 MG/DL (ref 65–100)
HCT VFR BLD AUTO: 27.5 % (ref 35–47)
HGB BLD-MCNC: 8.9 G/DL (ref 11.5–16)
MAGNESIUM SERPL-MCNC: 1.4 MG/DL (ref 1.6–2.4)
MCH RBC QN AUTO: 31.3 PG (ref 26–34)
MCHC RBC AUTO-ENTMCNC: 32.4 G/DL (ref 30–36.5)
MCV RBC AUTO: 96.8 FL (ref 80–99)
NRBC # BLD: 0 K/UL (ref 0–0.01)
NRBC BLD-RTO: 0 PER 100 WBC
PHOSPHATE SERPL-MCNC: 4.2 MG/DL (ref 2.6–4.7)
PLATELET # BLD AUTO: 106 K/UL (ref 150–400)
PMV BLD AUTO: 11 FL (ref 8.9–12.9)
POTASSIUM SERPL-SCNC: 3.2 MMOL/L (ref 3.5–5.1)
PROT SERPL-MCNC: 6.2 G/DL (ref 6.4–8.2)
RBC # BLD AUTO: 2.84 M/UL (ref 3.8–5.2)
SODIUM SERPL-SCNC: 138 MMOL/L (ref 136–145)
TSH SERPL DL<=0.05 MIU/L-ACNC: 3.79 UIU/ML (ref 0.36–3.74)
WBC # BLD AUTO: 5.6 K/UL (ref 3.6–11)

## 2023-05-27 PROCEDURE — 84100 ASSAY OF PHOSPHORUS: CPT

## 2023-05-27 PROCEDURE — 6370000000 HC RX 637 (ALT 250 FOR IP): Performed by: INTERNAL MEDICINE

## 2023-05-27 PROCEDURE — 83735 ASSAY OF MAGNESIUM: CPT

## 2023-05-27 PROCEDURE — G0378 HOSPITAL OBSERVATION PER HR: HCPCS

## 2023-05-27 PROCEDURE — 97535 SELF CARE MNGMENT TRAINING: CPT

## 2023-05-27 PROCEDURE — 96365 THER/PROPH/DIAG IV INF INIT: CPT

## 2023-05-27 PROCEDURE — 80053 COMPREHEN METABOLIC PANEL: CPT

## 2023-05-27 PROCEDURE — 6370000000 HC RX 637 (ALT 250 FOR IP): Performed by: PHYSICIAN ASSISTANT

## 2023-05-27 PROCEDURE — 97116 GAIT TRAINING THERAPY: CPT

## 2023-05-27 PROCEDURE — 97530 THERAPEUTIC ACTIVITIES: CPT

## 2023-05-27 PROCEDURE — 2580000003 HC RX 258: Performed by: INTERNAL MEDICINE

## 2023-05-27 PROCEDURE — 6360000002 HC RX W HCPCS: Performed by: INTERNAL MEDICINE

## 2023-05-27 PROCEDURE — 6370000000 HC RX 637 (ALT 250 FOR IP): Performed by: ORTHOPAEDIC SURGERY

## 2023-05-27 PROCEDURE — 85027 COMPLETE CBC AUTOMATED: CPT

## 2023-05-27 PROCEDURE — 97165 OT EVAL LOW COMPLEX 30 MIN: CPT

## 2023-05-27 PROCEDURE — 96366 THER/PROPH/DIAG IV INF ADDON: CPT

## 2023-05-27 PROCEDURE — 36415 COLL VENOUS BLD VENIPUNCTURE: CPT

## 2023-05-27 PROCEDURE — 2580000003 HC RX 258: Performed by: PHYSICIAN ASSISTANT

## 2023-05-27 PROCEDURE — 84443 ASSAY THYROID STIM HORMONE: CPT

## 2023-05-27 PROCEDURE — 82140 ASSAY OF AMMONIA: CPT

## 2023-05-27 RX ORDER — MAGNESIUM SULFATE IN WATER 40 MG/ML
2000 INJECTION, SOLUTION INTRAVENOUS
Status: COMPLETED | OUTPATIENT
Start: 2023-05-27 | End: 2023-05-27

## 2023-05-27 RX ADMIN — OXYCODONE 5 MG: 5 TABLET ORAL at 14:20

## 2023-05-27 RX ADMIN — PHENOBARBITAL 32.4 MG: 32.4 TABLET ORAL at 10:02

## 2023-05-27 RX ADMIN — SODIUM CHLORIDE, SODIUM LACTATE, POTASSIUM CHLORIDE, AND CALCIUM CHLORIDE: 600; 310; 30; 20 INJECTION, SOLUTION INTRAVENOUS at 10:14

## 2023-05-27 RX ADMIN — ATORVASTATIN CALCIUM 10 MG: 10 TABLET, FILM COATED ORAL at 22:51

## 2023-05-27 RX ADMIN — PHENOBARBITAL 32.4 MG: 32.4 TABLET ORAL at 22:54

## 2023-05-27 RX ADMIN — BUSPIRONE HYDROCHLORIDE 10 MG: 10 TABLET ORAL at 10:01

## 2023-05-27 RX ADMIN — Medication 100 MG: at 10:02

## 2023-05-27 RX ADMIN — FOLIC ACID 1 MG: 1 TABLET ORAL at 10:02

## 2023-05-27 RX ADMIN — SODIUM CHLORIDE, PRESERVATIVE FREE 10 ML: 5 INJECTION INTRAVENOUS at 22:59

## 2023-05-27 RX ADMIN — SENNOSIDES AND DOCUSATE SODIUM 1 TABLET: 50; 8.6 TABLET ORAL at 10:02

## 2023-05-27 RX ADMIN — MAGNESIUM SULFATE HEPTAHYDRATE 2000 MG: 40 INJECTION, SOLUTION INTRAVENOUS at 10:00

## 2023-05-27 RX ADMIN — FAMOTIDINE 20 MG: 20 TABLET ORAL at 10:02

## 2023-05-27 RX ADMIN — ASPIRIN 81 MG: 81 TABLET, COATED ORAL at 22:51

## 2023-05-27 RX ADMIN — THERA TABS 1 TABLET: TAB at 10:02

## 2023-05-27 RX ADMIN — ACETAMINOPHEN 650 MG: 325 TABLET ORAL at 05:27

## 2023-05-27 RX ADMIN — DULOXETINE HYDROCHLORIDE 30 MG: 30 CAPSULE, DELAYED RELEASE ORAL at 10:02

## 2023-05-27 RX ADMIN — MAGNESIUM SULFATE HEPTAHYDRATE 2000 MG: 40 INJECTION, SOLUTION INTRAVENOUS at 12:33

## 2023-05-27 RX ADMIN — OXYCODONE 5 MG: 5 TABLET ORAL at 03:18

## 2023-05-27 RX ADMIN — Medication 2000 UNITS: at 10:02

## 2023-05-27 RX ADMIN — SODIUM CHLORIDE, PRESERVATIVE FREE 10 ML: 5 INJECTION INTRAVENOUS at 10:03

## 2023-05-27 RX ADMIN — OXYCODONE 5 MG: 5 TABLET ORAL at 10:00

## 2023-05-27 RX ADMIN — ACETAMINOPHEN 650 MG: 325 TABLET ORAL at 22:58

## 2023-05-27 RX ADMIN — POTASSIUM BICARBONATE 40 MEQ: 782 TABLET, EFFERVESCENT ORAL at 10:01

## 2023-05-27 RX ADMIN — ASPIRIN 81 MG: 81 TABLET, COATED ORAL at 10:02

## 2023-05-27 RX ADMIN — OXYCODONE 5 MG: 5 TABLET ORAL at 18:16

## 2023-05-27 RX ADMIN — ACETAMINOPHEN 650 MG: 325 TABLET ORAL at 12:33

## 2023-05-27 RX ADMIN — TRAZODONE HYDROCHLORIDE 50 MG: 50 TABLET ORAL at 22:50

## 2023-05-27 RX ADMIN — SENNOSIDES AND DOCUSATE SODIUM 1 TABLET: 50; 8.6 TABLET ORAL at 22:50

## 2023-05-27 RX ADMIN — CYANOCOBALAMIN TAB 500 MCG 1000 MCG: 500 TAB at 10:02

## 2023-05-27 RX ADMIN — OXYCODONE HYDROCHLORIDE 10 MG: 5 TABLET ORAL at 22:55

## 2023-05-27 RX ADMIN — ACETAMINOPHEN 650 MG: 325 TABLET ORAL at 17:14

## 2023-05-27 RX ADMIN — BUSPIRONE HYDROCHLORIDE 10 MG: 10 TABLET ORAL at 22:51

## 2023-05-27 RX ADMIN — CARVEDILOL 25 MG: 12.5 TABLET, FILM COATED ORAL at 10:01

## 2023-05-27 RX ADMIN — LEVOTHYROXINE SODIUM 50 MCG: 0.05 TABLET ORAL at 05:27

## 2023-05-27 RX ADMIN — CARVEDILOL 25 MG: 12.5 TABLET, FILM COATED ORAL at 17:15

## 2023-05-27 ASSESSMENT — PAIN DESCRIPTION - ORIENTATION
ORIENTATION: LEFT

## 2023-05-27 ASSESSMENT — PAIN DESCRIPTION - DESCRIPTORS
DESCRIPTORS: ACHING
DESCRIPTORS: ACHING
DESCRIPTORS: SHARP
DESCRIPTORS: ACHING
DESCRIPTORS: SHARP
DESCRIPTORS: ACHING

## 2023-05-27 ASSESSMENT — PAIN - FUNCTIONAL ASSESSMENT
PAIN_FUNCTIONAL_ASSESSMENT: ACTIVITIES ARE NOT PREVENTED

## 2023-05-27 ASSESSMENT — PAIN SCALES - GENERAL
PAINLEVEL_OUTOF10: 6
PAINLEVEL_OUTOF10: 2
PAINLEVEL_OUTOF10: 0
PAINLEVEL_OUTOF10: 5
PAINLEVEL_OUTOF10: 2
PAINLEVEL_OUTOF10: 2
PAINLEVEL_OUTOF10: 8
PAINLEVEL_OUTOF10: 9
PAINLEVEL_OUTOF10: 4
PAINLEVEL_OUTOF10: 9

## 2023-05-27 ASSESSMENT — PAIN DESCRIPTION - LOCATION
LOCATION: GROIN;HIP
LOCATION: HIP
LOCATION: HIP
LOCATION: LEG
LOCATION: HIP
LOCATION: GROIN;HIP

## 2023-05-27 NOTE — ANESTHESIA POSTPROCEDURE EVALUATION
Department of Anesthesiology  Postprocedure Note    Patient: Olaf Kapadia  MRN: [id number]  YOB: 1955  Date of evaluation: 5/27/2023      Procedure Summary     Date: 05/25/23 Room / Location: [facility] ASU OR  / [facility] AMBULATORY OR    Anesthesia Start: 2248 Anesthesia Stop: 1452    Procedure: LEFT TOTAL HIP ARTHROPLASTY, DIRECT ANTERIOR (JESSIE) (Left: Hip) Diagnosis:       Primary osteoarthritis of left hip      (Primary osteoarthritis of left hip [M16.12])    Surgeons: Dick Irizarry MD Responsible Provider: Codi Arguelles MD    Anesthesia Type: spinal ASA Status: 3          Anesthesia Type: No value filed.     Karen Phase I: Karen Score: 9    Karen Phase II:        Anesthesia Post Evaluation    Patient location during evaluation: PACU  Level of consciousness: awake and awake and alert  Pain score: 1  Airway patency: patent  Nausea & Vomiting: no nausea and no vomiting  Complications: no  Cardiovascular status: blood pressure returned to baseline and hemodynamically stable  Respiratory status: acceptable  Hydration status: euvolemic

## 2023-05-28 LAB
ANION GAP SERPL CALC-SCNC: 5 MMOL/L (ref 5–15)
BASOPHILS # BLD: 0 K/UL (ref 0–0.1)
BASOPHILS NFR BLD: 0 % (ref 0–1)
BUN SERPL-MCNC: 18 MG/DL (ref 6–20)
BUN/CREAT SERPL: 13 (ref 12–20)
CALCIUM SERPL-MCNC: 8.2 MG/DL (ref 8.5–10.1)
CHLORIDE SERPL-SCNC: 109 MMOL/L (ref 97–108)
CO2 SERPL-SCNC: 27 MMOL/L (ref 21–32)
CREAT SERPL-MCNC: 1.37 MG/DL (ref 0.55–1.02)
DIFFERENTIAL METHOD BLD: ABNORMAL
EOSINOPHIL # BLD: 0.1 K/UL (ref 0–0.4)
EOSINOPHIL NFR BLD: 2 % (ref 0–7)
ERYTHROCYTE [DISTWIDTH] IN BLOOD BY AUTOMATED COUNT: 14.6 % (ref 11.5–14.5)
GLUCOSE SERPL-MCNC: 94 MG/DL (ref 65–100)
HCT VFR BLD AUTO: 27.9 % (ref 35–47)
HGB BLD-MCNC: 9.3 G/DL (ref 11.5–16)
IMM GRANULOCYTES # BLD AUTO: 0 K/UL (ref 0–0.04)
IMM GRANULOCYTES NFR BLD AUTO: 1 % (ref 0–0.5)
LYMPHOCYTES # BLD: 1.5 K/UL (ref 0.8–3.5)
LYMPHOCYTES NFR BLD: 22 % (ref 12–49)
MAGNESIUM SERPL-MCNC: 2.3 MG/DL (ref 1.6–2.4)
MCH RBC QN AUTO: 32 PG (ref 26–34)
MCHC RBC AUTO-ENTMCNC: 33.3 G/DL (ref 30–36.5)
MCV RBC AUTO: 95.9 FL (ref 80–99)
MONOCYTES # BLD: 0.5 K/UL (ref 0–1)
MONOCYTES NFR BLD: 8 % (ref 5–13)
NEUTS SEG # BLD: 4.6 K/UL (ref 1.8–8)
NEUTS SEG NFR BLD: 68 % (ref 32–75)
NRBC # BLD: 0 K/UL (ref 0–0.01)
NRBC BLD-RTO: 0 PER 100 WBC
PLATELET # BLD AUTO: 112 K/UL (ref 150–400)
PMV BLD AUTO: 11.5 FL (ref 8.9–12.9)
POTASSIUM SERPL-SCNC: 3.5 MMOL/L (ref 3.5–5.1)
RBC # BLD AUTO: 2.91 M/UL (ref 3.8–5.2)
SODIUM SERPL-SCNC: 141 MMOL/L (ref 136–145)
WBC # BLD AUTO: 6.8 K/UL (ref 3.6–11)

## 2023-05-28 PROCEDURE — 6370000000 HC RX 637 (ALT 250 FOR IP): Performed by: PHYSICIAN ASSISTANT

## 2023-05-28 PROCEDURE — G0378 HOSPITAL OBSERVATION PER HR: HCPCS

## 2023-05-28 PROCEDURE — 6370000000 HC RX 637 (ALT 250 FOR IP): Performed by: INTERNAL MEDICINE

## 2023-05-28 PROCEDURE — 36415 COLL VENOUS BLD VENIPUNCTURE: CPT

## 2023-05-28 PROCEDURE — 2580000003 HC RX 258: Performed by: INTERNAL MEDICINE

## 2023-05-28 PROCEDURE — 2580000003 HC RX 258: Performed by: PHYSICIAN ASSISTANT

## 2023-05-28 PROCEDURE — 85025 COMPLETE CBC W/AUTO DIFF WBC: CPT

## 2023-05-28 PROCEDURE — 83735 ASSAY OF MAGNESIUM: CPT

## 2023-05-28 PROCEDURE — 80048 BASIC METABOLIC PNL TOTAL CA: CPT

## 2023-05-28 PROCEDURE — 6370000000 HC RX 637 (ALT 250 FOR IP): Performed by: ORTHOPAEDIC SURGERY

## 2023-05-28 RX ORDER — PHENOBARBITAL 32.4 MG/1
16.2 TABLET ORAL 2 TIMES DAILY
Status: COMPLETED | OUTPATIENT
Start: 2023-05-28 | End: 2023-05-28

## 2023-05-28 RX ADMIN — Medication 100 MG: at 09:45

## 2023-05-28 RX ADMIN — ACETAMINOPHEN 650 MG: 325 TABLET ORAL at 17:58

## 2023-05-28 RX ADMIN — OXYCODONE HYDROCHLORIDE 10 MG: 5 TABLET ORAL at 20:23

## 2023-05-28 RX ADMIN — POTASSIUM BICARBONATE 40 MEQ: 782 TABLET, EFFERVESCENT ORAL at 09:46

## 2023-05-28 RX ADMIN — ATORVASTATIN CALCIUM 10 MG: 10 TABLET, FILM COATED ORAL at 22:06

## 2023-05-28 RX ADMIN — SODIUM CHLORIDE, PRESERVATIVE FREE 10 ML: 5 INJECTION INTRAVENOUS at 21:09

## 2023-05-28 RX ADMIN — FAMOTIDINE 20 MG: 20 TABLET ORAL at 09:47

## 2023-05-28 RX ADMIN — BUSPIRONE HYDROCHLORIDE 10 MG: 10 TABLET ORAL at 09:44

## 2023-05-28 RX ADMIN — CARVEDILOL 25 MG: 12.5 TABLET, FILM COATED ORAL at 17:58

## 2023-05-28 RX ADMIN — ACETAMINOPHEN 650 MG: 325 TABLET ORAL at 23:32

## 2023-05-28 RX ADMIN — BUSPIRONE HYDROCHLORIDE 10 MG: 10 TABLET ORAL at 20:07

## 2023-05-28 RX ADMIN — ASPIRIN 81 MG: 81 TABLET, COATED ORAL at 21:09

## 2023-05-28 RX ADMIN — THERA TABS 1 TABLET: TAB at 09:45

## 2023-05-28 RX ADMIN — Medication 2000 UNITS: at 09:51

## 2023-05-28 RX ADMIN — LEVOTHYROXINE SODIUM 50 MCG: 0.05 TABLET ORAL at 05:47

## 2023-05-28 RX ADMIN — CARVEDILOL 25 MG: 12.5 TABLET, FILM COATED ORAL at 09:51

## 2023-05-28 RX ADMIN — FOLIC ACID 1 MG: 1 TABLET ORAL at 09:45

## 2023-05-28 RX ADMIN — OXYCODONE 5 MG: 5 TABLET ORAL at 09:48

## 2023-05-28 RX ADMIN — SODIUM CHLORIDE, SODIUM LACTATE, POTASSIUM CHLORIDE, AND CALCIUM CHLORIDE: 600; 310; 30; 20 INJECTION, SOLUTION INTRAVENOUS at 10:06

## 2023-05-28 RX ADMIN — CYANOCOBALAMIN TAB 500 MCG 1000 MCG: 500 TAB at 09:46

## 2023-05-28 RX ADMIN — OXYCODONE HYDROCHLORIDE 10 MG: 5 TABLET ORAL at 05:52

## 2023-05-28 RX ADMIN — SENNOSIDES AND DOCUSATE SODIUM 1 TABLET: 50; 8.6 TABLET ORAL at 21:09

## 2023-05-28 RX ADMIN — ASPIRIN 81 MG: 81 TABLET, COATED ORAL at 09:45

## 2023-05-28 RX ADMIN — PHENOBARBITAL 16.2 MG: 32.4 TABLET ORAL at 21:09

## 2023-05-28 RX ADMIN — SENNOSIDES AND DOCUSATE SODIUM 1 TABLET: 50; 8.6 TABLET ORAL at 09:45

## 2023-05-28 RX ADMIN — PHENOBARBITAL 16.2 MG: 32.4 TABLET ORAL at 09:47

## 2023-05-28 RX ADMIN — DIPHENHYDRAMINE HYDROCHLORIDE 25 MG: 25 CAPSULE ORAL at 18:35

## 2023-05-28 RX ADMIN — ACETAMINOPHEN 650 MG: 325 TABLET ORAL at 12:18

## 2023-05-28 RX ADMIN — SODIUM CHLORIDE, PRESERVATIVE FREE 10 ML: 5 INJECTION INTRAVENOUS at 09:51

## 2023-05-28 RX ADMIN — DULOXETINE HYDROCHLORIDE 30 MG: 30 CAPSULE, DELAYED RELEASE ORAL at 09:46

## 2023-05-28 RX ADMIN — TRAZODONE HYDROCHLORIDE 50 MG: 50 TABLET ORAL at 21:09

## 2023-05-28 RX ADMIN — ACETAMINOPHEN 650 MG: 325 TABLET ORAL at 05:46

## 2023-05-28 ASSESSMENT — PAIN SCALES - GENERAL
PAINLEVEL_OUTOF10: 3
PAINLEVEL_OUTOF10: 6
PAINLEVEL_OUTOF10: 8
PAINLEVEL_OUTOF10: 0
PAINLEVEL_OUTOF10: 0
PAINLEVEL_OUTOF10: 7
PAINLEVEL_OUTOF10: 3
PAINLEVEL_OUTOF10: 3

## 2023-05-28 ASSESSMENT — PAIN DESCRIPTION - DESCRIPTORS
DESCRIPTORS: SHARP
DESCRIPTORS: SHARP
DESCRIPTORS: ACHING

## 2023-05-28 ASSESSMENT — PAIN DESCRIPTION - ORIENTATION
ORIENTATION: MID
ORIENTATION: MID
ORIENTATION: LEFT

## 2023-05-28 ASSESSMENT — PAIN DESCRIPTION - LOCATION
LOCATION: HIP
LOCATION: BACK
LOCATION: BACK

## 2023-05-28 ASSESSMENT — PAIN - FUNCTIONAL ASSESSMENT: PAIN_FUNCTIONAL_ASSESSMENT: ACTIVITIES ARE NOT PREVENTED

## 2023-05-29 PROCEDURE — 94761 N-INVAS EAR/PLS OXIMETRY MLT: CPT

## 2023-05-29 PROCEDURE — 6360000002 HC RX W HCPCS: Performed by: INTERNAL MEDICINE

## 2023-05-29 PROCEDURE — 6370000000 HC RX 637 (ALT 250 FOR IP): Performed by: ORTHOPAEDIC SURGERY

## 2023-05-29 PROCEDURE — 2580000003 HC RX 258: Performed by: INTERNAL MEDICINE

## 2023-05-29 PROCEDURE — 96376 TX/PRO/DX INJ SAME DRUG ADON: CPT

## 2023-05-29 PROCEDURE — 6370000000 HC RX 637 (ALT 250 FOR IP): Performed by: INTERNAL MEDICINE

## 2023-05-29 PROCEDURE — 2580000003 HC RX 258: Performed by: PHYSICIAN ASSISTANT

## 2023-05-29 PROCEDURE — G0378 HOSPITAL OBSERVATION PER HR: HCPCS

## 2023-05-29 PROCEDURE — 6370000000 HC RX 637 (ALT 250 FOR IP): Performed by: PHYSICIAN ASSISTANT

## 2023-05-29 PROCEDURE — 96375 TX/PRO/DX INJ NEW DRUG ADDON: CPT

## 2023-05-29 RX ORDER — AMLODIPINE BESYLATE 5 MG/1
10 TABLET ORAL DAILY
Status: DISCONTINUED | OUTPATIENT
Start: 2023-05-29 | End: 2023-05-30 | Stop reason: HOSPADM

## 2023-05-29 RX ORDER — HYDRALAZINE HYDROCHLORIDE 20 MG/ML
10 INJECTION INTRAMUSCULAR; INTRAVENOUS EVERY 6 HOURS PRN
Status: DISCONTINUED | OUTPATIENT
Start: 2023-05-29 | End: 2023-05-30 | Stop reason: HOSPADM

## 2023-05-29 RX ADMIN — TRAZODONE HYDROCHLORIDE 50 MG: 50 TABLET ORAL at 20:38

## 2023-05-29 RX ADMIN — AMLODIPINE BESYLATE 10 MG: 5 TABLET ORAL at 14:02

## 2023-05-29 RX ADMIN — ASPIRIN 81 MG: 81 TABLET, COATED ORAL at 09:39

## 2023-05-29 RX ADMIN — CEFTRIAXONE 1000 MG: 1 INJECTION, POWDER, FOR SOLUTION INTRAMUSCULAR; INTRAVENOUS at 11:47

## 2023-05-29 RX ADMIN — CARVEDILOL 25 MG: 12.5 TABLET, FILM COATED ORAL at 09:39

## 2023-05-29 RX ADMIN — SENNOSIDES AND DOCUSATE SODIUM 1 TABLET: 50; 8.6 TABLET ORAL at 20:38

## 2023-05-29 RX ADMIN — ASPIRIN 81 MG: 81 TABLET, COATED ORAL at 20:38

## 2023-05-29 RX ADMIN — DULOXETINE HYDROCHLORIDE 30 MG: 30 CAPSULE, DELAYED RELEASE ORAL at 09:38

## 2023-05-29 RX ADMIN — Medication 2000 UNITS: at 09:38

## 2023-05-29 RX ADMIN — FAMOTIDINE 20 MG: 20 TABLET ORAL at 09:39

## 2023-05-29 RX ADMIN — Medication 100 MG: at 09:39

## 2023-05-29 RX ADMIN — THERA TABS 1 TABLET: TAB at 09:39

## 2023-05-29 RX ADMIN — ATORVASTATIN CALCIUM 10 MG: 10 TABLET, FILM COATED ORAL at 21:32

## 2023-05-29 RX ADMIN — BUSPIRONE HYDROCHLORIDE 10 MG: 10 TABLET ORAL at 20:23

## 2023-05-29 RX ADMIN — OXYCODONE HYDROCHLORIDE 10 MG: 5 TABLET ORAL at 00:34

## 2023-05-29 RX ADMIN — HYDRALAZINE HYDROCHLORIDE 10 MG: 20 INJECTION INTRAMUSCULAR; INTRAVENOUS at 11:46

## 2023-05-29 RX ADMIN — CARVEDILOL 25 MG: 12.5 TABLET, FILM COATED ORAL at 17:08

## 2023-05-29 RX ADMIN — ACETAMINOPHEN 650 MG: 325 TABLET ORAL at 06:10

## 2023-05-29 RX ADMIN — CYANOCOBALAMIN TAB 500 MCG 1000 MCG: 500 TAB at 09:39

## 2023-05-29 RX ADMIN — LEVOTHYROXINE SODIUM 50 MCG: 0.05 TABLET ORAL at 06:10

## 2023-05-29 RX ADMIN — SODIUM CHLORIDE, PRESERVATIVE FREE 10 ML: 5 INJECTION INTRAVENOUS at 20:39

## 2023-05-29 RX ADMIN — OXYCODONE 5 MG: 5 TABLET ORAL at 09:46

## 2023-05-29 RX ADMIN — OXYCODONE HYDROCHLORIDE 10 MG: 5 TABLET ORAL at 21:32

## 2023-05-29 RX ADMIN — BUSPIRONE HYDROCHLORIDE 10 MG: 10 TABLET ORAL at 09:39

## 2023-05-29 RX ADMIN — OXYCODONE 5 MG: 5 TABLET ORAL at 15:10

## 2023-05-29 RX ADMIN — ACETAMINOPHEN 650 MG: 325 TABLET ORAL at 17:08

## 2023-05-29 RX ADMIN — HYDRALAZINE HYDROCHLORIDE 10 MG: 20 INJECTION INTRAMUSCULAR; INTRAVENOUS at 23:35

## 2023-05-29 RX ADMIN — FOLIC ACID 1 MG: 1 TABLET ORAL at 09:39

## 2023-05-29 RX ADMIN — ACETAMINOPHEN 650 MG: 325 TABLET ORAL at 11:47

## 2023-05-29 RX ADMIN — ACETAMINOPHEN 650 MG: 325 TABLET ORAL at 23:35

## 2023-05-29 ASSESSMENT — PAIN SCALES - GENERAL
PAINLEVEL_OUTOF10: 0
PAINLEVEL_OUTOF10: 5
PAINLEVEL_OUTOF10: 0
PAINLEVEL_OUTOF10: 3
PAINLEVEL_OUTOF10: 3
PAINLEVEL_OUTOF10: 8
PAINLEVEL_OUTOF10: 3
PAINLEVEL_OUTOF10: 7
PAINLEVEL_OUTOF10: 2
PAINLEVEL_OUTOF10: 5
PAINLEVEL_OUTOF10: 3
PAINLEVEL_OUTOF10: 5

## 2023-05-29 ASSESSMENT — PAIN DESCRIPTION - LOCATION
LOCATION: HIP

## 2023-05-29 ASSESSMENT — PAIN DESCRIPTION - DESCRIPTORS
DESCRIPTORS: ACHING
DESCRIPTORS: SHARP
DESCRIPTORS: ACHING
DESCRIPTORS: SHARP
DESCRIPTORS: ACHING

## 2023-05-29 ASSESSMENT — PAIN DESCRIPTION - ORIENTATION
ORIENTATION: LEFT
ORIENTATION: LEFT
ORIENTATION: UPPER
ORIENTATION: LEFT

## 2023-05-29 NOTE — PROGRESS NOTES
[facility]  [address] [facility], [address] [place] [address]  [phone]      Hospitalist  Progress Note      NAME:       Apollo Hwang   :        1955  MRM:        [id number]    Date of service: 2023      Subjective: Patient seen and examined by me. Patient admitted with OA left hip. Asked to review her for her high Bps. She has no chest pain or SOB. Anxious to go home today. No dysuria. No frequency. Afebrile.        Objective:    Vital Signs:    BP (!) 183/105 Comment: nurse notified  Pulse 91   Temp 98.4 °F (36.9 °C) (Oral)   Resp 16   Ht 1.702 m (5' 7\")   Wt 71.3 kg (157 lb 3 oz)   SpO2 95%   BMI 24.62 kg/m²        Intake/Output Summary (Last 24 hours) at 2023 1152  Last data filed at 2023 1926  Gross per 24 hour   Intake 0 ml   Output --   Net 0 ml          Current inpatient medications reviewed:  Current Facility-Administered Medications   Medication Dose Route Frequency    hydrALAZINE (APRESOLINE) injection 10 mg  10 mg IntraVENous Q6H PRN    cefTRIAXone (ROCEPHIN) 1,000 mg in sterile water 10 mL IV syringe  1,000 mg IntraVENous Q24H    famotidine (PEPCID) tablet 20 mg  20 mg Oral Daily    Or    famotidine (PEPCID) 20 mg in sodium chloride (PF) 0.9 % 10 mL injection  20 mg IntraVENous Daily    thiamine mononitrate tablet 100 mg  100 mg Oral Daily    multivitamin 1 tablet  1 tablet Oral Daily    folic acid (FOLVITE) tablet 1 mg  1 mg Oral Daily    LORazepam (ATIVAN) tablet 1 mg  1 mg Oral Q1H PRN    Or    LORazepam (ATIVAN) injection 1 mg  1 mg IntraVENous Q1H PRN    Or    LORazepam (ATIVAN) tablet 2 mg  2 mg Oral Q1H PRN    Or    LORazepam (ATIVAN) injection 2 mg  2 mg IntraVENous Q1H PRN    Or    LORazepam (ATIVAN) tablet 3 mg  3 mg Oral Q1H PRN    Or    LORazepam (ATIVAN) injection 3 mg  3 mg IntraVENous Q1H PRN    Or    LORazepam (ATIVAN) tablet 4 mg  4 mg Oral Q1H PRN    Or    LORazepam

## 2023-05-29 NOTE — PROGRESS NOTES
TOTAL HIP ARTHROPLASTY DAILY NOTE     ASSESSMENT / PLAN :   Pain Control : Excellent - Able to sleep and participate with therapy  Overnight Issues : none  Wound or incisional issue : Healing incision with no visible drainage  Therapy / Weight Bearing Recommendations : Weight bear as tolerated with use of a walker and two person assist while mobilizing  DVT Prophylaxis : Mechanical and Aspirin and mechanical lower extremity compression device  Disposition : Discharge to home with home health (maximum of 4 visits)  Medical Concerns : none  Comments : Stable and home in the am once her optifoam dressing is changed. POD  4 Days Post-Op s/p Procedure(s):  LEFT TOTAL HIP ARTHROPLASTY, DIRECT ANTERIOR (JESSIE)     SUBJECTIVE :     Concerns : None, wishes to go home. OBJECTIVE :     Patient Vitals for the past 24 hrs:   BP Temp Temp src Pulse Resp SpO2   05/29/23 1540 -- -- -- -- 14 --   05/29/23 1500 (!) 187/105 98.1 °F (36.7 °C) Oral 90 16 --   05/29/23 1230 (!) 182/84 97.9 °F (36.6 °C) Oral 77 14 --   05/29/23 1016 -- -- -- -- 20 --   05/29/23 0946 (!) 178/102 -- -- 83 -- --   05/29/23 0738 (!) 183/105 98.4 °F (36.9 °C) Oral 91 16 95 %   05/29/23 0431 (!) 146/77 98.7 °F (37.1 °C) Oral 85 16 94 %   05/29/23 0004 (!) 158/83 97.5 °F (36.4 °C) Oral 88 16 91 %   05/28/23 2023 -- -- -- -- 16 --   05/28/23 1926 (!) 161/90 99.2 °F (37.3 °C) Oral 91 15 95 %   05/28/23 1758 (!) 165/95 -- -- 89 -- --       Alert and oriented x3. Right  exam of the hip reveals that the dressing is clean, dry and intact. The patient is able to fire the quadriceps / flex at the hip  Sensation is intact to light touch. No calf pain.         ANTICOAGULANTS / LABS :          Labs:  Recent Labs     05/28/23  0326   HGB 9.3*   HCT 27.9*      K 3.5   *   CO2 27   BUN 18        Patient mobility           Starla Limon MD  Cell [phone]  Kash Luciano PA-C  Cell [phone]  Medical Assistants: John Bustamante

## 2023-05-30 VITALS
HEART RATE: 90 BPM | HEIGHT: 67 IN | WEIGHT: 157.19 LBS | SYSTOLIC BLOOD PRESSURE: 124 MMHG | OXYGEN SATURATION: 97 % | RESPIRATION RATE: 18 BRPM | DIASTOLIC BLOOD PRESSURE: 85 MMHG | TEMPERATURE: 97.6 F | BODY MASS INDEX: 24.67 KG/M2

## 2023-05-30 LAB
ANION GAP SERPL CALC-SCNC: 8 MMOL/L (ref 5–15)
BUN SERPL-MCNC: 12 MG/DL (ref 6–20)
BUN/CREAT SERPL: 10 (ref 12–20)
CALCIUM SERPL-MCNC: 8.6 MG/DL (ref 8.5–10.1)
CHLORIDE SERPL-SCNC: 100 MMOL/L (ref 97–108)
CO2 SERPL-SCNC: 30 MMOL/L (ref 21–32)
CREAT SERPL-MCNC: 1.17 MG/DL (ref 0.55–1.02)
GLUCOSE SERPL-MCNC: 231 MG/DL (ref 65–100)
POTASSIUM SERPL-SCNC: 3.1 MMOL/L (ref 3.5–5.1)
SODIUM SERPL-SCNC: 138 MMOL/L (ref 136–145)

## 2023-05-30 PROCEDURE — 2580000003 HC RX 258: Performed by: PHYSICIAN ASSISTANT

## 2023-05-30 PROCEDURE — 2580000003 HC RX 258: Performed by: INTERNAL MEDICINE

## 2023-05-30 PROCEDURE — 36415 COLL VENOUS BLD VENIPUNCTURE: CPT

## 2023-05-30 PROCEDURE — 6370000000 HC RX 637 (ALT 250 FOR IP): Performed by: PHYSICIAN ASSISTANT

## 2023-05-30 PROCEDURE — G0378 HOSPITAL OBSERVATION PER HR: HCPCS

## 2023-05-30 PROCEDURE — 6360000002 HC RX W HCPCS: Performed by: INTERNAL MEDICINE

## 2023-05-30 PROCEDURE — 80048 BASIC METABOLIC PNL TOTAL CA: CPT

## 2023-05-30 PROCEDURE — 6370000000 HC RX 637 (ALT 250 FOR IP): Performed by: INTERNAL MEDICINE

## 2023-05-30 PROCEDURE — 97116 GAIT TRAINING THERAPY: CPT

## 2023-05-30 PROCEDURE — 6370000000 HC RX 637 (ALT 250 FOR IP): Performed by: ORTHOPAEDIC SURGERY

## 2023-05-30 RX ORDER — ASPIRIN 81 MG/1
81 TABLET ORAL 2 TIMES DAILY
Qty: 60 TABLET | Refills: 3 | Status: SHIPPED | OUTPATIENT
Start: 2023-05-30

## 2023-05-30 RX ORDER — OXYCODONE HYDROCHLORIDE 5 MG/1
5 TABLET ORAL EVERY 4 HOURS PRN
Qty: 30 TABLET | Refills: 0 | Status: SHIPPED | OUTPATIENT
Start: 2023-05-30 | End: 2023-06-06

## 2023-05-30 RX ORDER — AMLODIPINE BESYLATE 10 MG/1
10 TABLET ORAL DAILY
Qty: 30 TABLET | Refills: 1 | Status: SHIPPED | OUTPATIENT
Start: 2023-05-31 | End: 2023-06-30

## 2023-05-30 RX ORDER — TRAMADOL HYDROCHLORIDE 50 MG/1
50 TABLET ORAL EVERY 6 HOURS PRN
Qty: 30 TABLET | Refills: 0 | Status: SHIPPED | OUTPATIENT
Start: 2023-05-30 | End: 2023-06-07

## 2023-05-30 RX ORDER — ACETAMINOPHEN 325 MG/1
650 TABLET ORAL EVERY 4 HOURS
Qty: 360 TABLET | Refills: 0 | Status: SHIPPED | OUTPATIENT
Start: 2023-05-30 | End: 2023-06-29

## 2023-05-30 RX ORDER — CEPHALEXIN 500 MG/1
500 CAPSULE ORAL 2 TIMES DAILY
Qty: 6 CAPSULE | Refills: 0 | Status: SHIPPED | OUTPATIENT
Start: 2023-05-31 | End: 2023-06-03

## 2023-05-30 RX ADMIN — Medication 100 MG: at 08:01

## 2023-05-30 RX ADMIN — OXYCODONE 5 MG: 5 TABLET ORAL at 09:17

## 2023-05-30 RX ADMIN — CEFTRIAXONE 1000 MG: 1 INJECTION, POWDER, FOR SOLUTION INTRAMUSCULAR; INTRAVENOUS at 08:10

## 2023-05-30 RX ADMIN — CARVEDILOL 25 MG: 12.5 TABLET, FILM COATED ORAL at 08:02

## 2023-05-30 RX ADMIN — THERA TABS 1 TABLET: TAB at 08:01

## 2023-05-30 RX ADMIN — BUSPIRONE HYDROCHLORIDE 10 MG: 10 TABLET ORAL at 08:04

## 2023-05-30 RX ADMIN — ACETAMINOPHEN 650 MG: 325 TABLET ORAL at 11:23

## 2023-05-30 RX ADMIN — LEVOTHYROXINE SODIUM 50 MCG: 0.05 TABLET ORAL at 05:53

## 2023-05-30 RX ADMIN — OXYCODONE 5 MG: 5 TABLET ORAL at 13:34

## 2023-05-30 RX ADMIN — CYANOCOBALAMIN TAB 500 MCG 1000 MCG: 500 TAB at 08:01

## 2023-05-30 RX ADMIN — FAMOTIDINE 20 MG: 20 TABLET ORAL at 08:02

## 2023-05-30 RX ADMIN — ASPIRIN 81 MG: 81 TABLET, COATED ORAL at 08:02

## 2023-05-30 RX ADMIN — DULOXETINE HYDROCHLORIDE 30 MG: 30 CAPSULE, DELAYED RELEASE ORAL at 08:02

## 2023-05-30 RX ADMIN — Medication 2000 UNITS: at 08:02

## 2023-05-30 RX ADMIN — POTASSIUM BICARBONATE 40 MEQ: 782 TABLET, EFFERVESCENT ORAL at 13:55

## 2023-05-30 RX ADMIN — SODIUM CHLORIDE, PRESERVATIVE FREE 10 ML: 5 INJECTION INTRAVENOUS at 08:05

## 2023-05-30 RX ADMIN — AMLODIPINE BESYLATE 10 MG: 5 TABLET ORAL at 08:01

## 2023-05-30 RX ADMIN — ACETAMINOPHEN 650 MG: 325 TABLET ORAL at 05:53

## 2023-05-30 RX ADMIN — FOLIC ACID 1 MG: 1 TABLET ORAL at 08:02

## 2023-05-30 ASSESSMENT — PAIN SCALES - GENERAL
PAINLEVEL_OUTOF10: 2
PAINLEVEL_OUTOF10: 5
PAINLEVEL_OUTOF10: 0
PAINLEVEL_OUTOF10: 6
PAINLEVEL_OUTOF10: 4

## 2023-05-30 ASSESSMENT — PAIN DESCRIPTION - ORIENTATION
ORIENTATION: LEFT
ORIENTATION: LEFT

## 2023-05-30 ASSESSMENT — PAIN DESCRIPTION - LOCATION
LOCATION: HIP
LOCATION: HIP

## 2023-05-30 ASSESSMENT — PAIN DESCRIPTION - DESCRIPTORS
DESCRIPTORS: ACHING
DESCRIPTORS: ACHING

## 2023-05-30 NOTE — PLAN OF CARE
Problem: Safety - Adult  Goal: Free from fall injury  5/30/2023 0754 by Tiffany Palacios RN  Outcome: Progressing  5/30/2023 0249 by Anne Cho RN  Outcome: Progressing     Problem: Pain  Goal: Verbalizes/displays adequate comfort level or baseline comfort level  5/30/2023 0754 by Tiffany Palacios RN  Outcome: Progressing  5/30/2023 0249 by Anne Cho RN  Outcome: Progressing     Problem: Discharge Planning  Goal: Discharge to home or other facility with appropriate resources  5/30/2023 0754 by Tiffany Palacios RN  Outcome: Progressing  5/30/2023 0249 by Anne Cho RN  Outcome: Progressing     Problem: ABCDS Injury Assessment  Goal: Absence of physical injury  5/30/2023 0754 by Tiffany Palacios RN  Outcome: Progressing  5/30/2023 0249 by Anne Cho RN  Outcome: Progressing
Problem: Safety - Adult  Goal: Free from fall injury  Outcome: Progressing     Problem: Pain  Goal: Verbalizes/displays adequate comfort level or baseline comfort level  Outcome: Progressing     Problem: Discharge Planning  Goal: Discharge to home or other facility with appropriate resources  Outcome: Progressing     Problem: ABCDS Injury Assessment  Goal: Absence of physical injury  Outcome: Progressing
Passive Self ROM   Comments   Ankle Pumps   [x]                                        []                                        []                                        []                                           Quad Sets   [x]                                        []                                        []                                        []                                           Heel Slides   []                                        []                                        []                                        []                                           Hip Abduction   []                                        []                                        []                                        []                                           Glut Sets   [x]                                        []                                        []                                        []                                              []                                        []                                        []                                        []                                              []                                        []                                        []                                        []                                             STANDING  EXERCISES   Sets   Reps   Active Active Assist   Passive Self ROM   Comments   Heel Raises   [x]                                        []                                        []                                        []                                           Hip Abduction   []                                        []                                        []                                        []                                              []                                        []                                        []                                        []                                              []

## 2023-05-30 NOTE — PROGRESS NOTES
Orthopaedic Progress Note  Post Op day: 5 Days Post-Op    May 30, 2023 11:40 AM     Patient: Lizzy Johns MRN: [id number]  SSN: [id number]    YOB: 1955  Age: 76 y.o. Sex: female      Admit date:  5/25/2023  Date of Surgery:  [unfilled]   Procedures:  Procedure(s):  LEFT TOTAL HIP ARTHROPLASTY, DIRECT ANTERIOR (Dony Sly)  Admitting Physician:  Mela Garvey MD   Surgeon:  Collette All) and Role:     * Mela aGrvey MD - Primary    Consulting Physician(s): Treatment Team: Attending Provider: Mela Garvey MD; Surgeon: Mela Garvey MD; Utilization Reviewer: Katja Valdez RN; Consulting Physician: Lisa Aguayo MD; Consulting Provider: Lisa Aguayo MD; Consulting Physician: Varun Jain MD; : Jessie Galvez; Patient Care Tech: Brown Chacon; Registered Nurse: Noemy Resendiz RN; Physical Therapist Assistant: Harley Tobin PTA; Occupational Therapist: Adeola Reyes OT    SUBJECTIVE:     Lizzy Johns is a 76 y.o. female is 5 Days Post-Op s/p Procedure(s):  LEFT TOTAL HIP ARTHROPLASTY, DIRECT ANTERIOR (JESSIE) with an appropriate level of post-operative pain. No complaints of nausea, vomiting, dizziness. Denies chest pain, palpitations or shortness of breath. OBJECTIVE:       Physical Exam:  General: Alert, cooperative, no distress. Respiratory: Respirations unlabored  Heart: RRR without murmur, apical pulse = 90bpm  Neurological:  Neurovascular exam within normal limits. Motor: + DF/PF. Musculoskeletal: Calves soft, supple, non-tender upon palpation. Dressing/Wound:  Clean, dry and intact but corner lifted. No significant erythema or swelling.       Vital Signs:      Patient Vitals for the past 8 hrs:   BP Temp Temp src Pulse Resp SpO2   05/30/23 0937 111/70 -- -- (!) 108 -- --   05/30/23 0917 118/82 -- -- (!) 101 -- --   05/30/23 0745 (!) 141/90 98 °F (36.7 °C) Oral (!) 114 18 95 %   05/30/23 0500 138/77 98.7 °F (37.1 °C) Oral 98 17 --   05/30/23 042

## 2023-05-30 NOTE — PROGRESS NOTES
Labs reviewed. Creatinine back to baseline. Stable to discharge from renal standpoint. Follow-up with us in the office.

## 2023-05-30 NOTE — PROGRESS NOTES
Efren Ku [facility] [place] [address]  [address] [facility], [address] [place] [address]  [phone]      Hospitalist  Progress Note      NAME:       Medina Jacobo   :        1955  MRM:        [id number]    Date of service: 2023      Subjective: Patient seen and examined by me. Patient admitted with OA left hip. She feels much better. No chest pain, headaches, nausea or vomiting. No fever.         Objective:    Vital Signs:    /77   Pulse 98   Temp 98.7 °F (37.1 °C) (Oral)   Resp 17   Ht 1.702 m (5' 7\")   Wt 71.3 kg (157 lb 3 oz)   SpO2 97%   BMI 24.62 kg/m²      No intake or output data in the 24 hours ending 23 0710       Current inpatient medications reviewed:  Current Facility-Administered Medications   Medication Dose Route Frequency    cefTRIAXone (ROCEPHIN) 1,000 mg in sterile water 10 mL IV syringe  1,000 mg IntraVENous Once    hydrALAZINE (APRESOLINE) injection 10 mg  10 mg IntraVENous Q6H PRN    amLODIPine (NORVASC) tablet 10 mg  10 mg Oral Daily    famotidine (PEPCID) tablet 20 mg  20 mg Oral Daily    Or    famotidine (PEPCID) 20 mg in sodium chloride (PF) 0.9 % 10 mL injection  20 mg IntraVENous Daily    thiamine mononitrate tablet 100 mg  100 mg Oral Daily    multivitamin 1 tablet  1 tablet Oral Daily    folic acid (FOLVITE) tablet 1 mg  1 mg Oral Daily    LORazepam (ATIVAN) tablet 1 mg  1 mg Oral Q1H PRN    Or    LORazepam (ATIVAN) injection 1 mg  1 mg IntraVENous Q1H PRN    Or    LORazepam (ATIVAN) tablet 2 mg  2 mg Oral Q1H PRN    Or    LORazepam (ATIVAN) injection 2 mg  2 mg IntraVENous Q1H PRN    Or    LORazepam (ATIVAN) tablet 3 mg  3 mg Oral Q1H PRN    Or    LORazepam (ATIVAN) injection 3 mg  3 mg IntraVENous Q1H PRN    Or    LORazepam (ATIVAN) tablet 4 mg  4 mg Oral Q1H PRN    Or    LORazepam (ATIVAN) injection 4 mg  4 mg IntraVENous Q1H PRN    busPIRone (BUSPAR) tablet 10 mg  10 mg Oral BID    carvedilol

## 2023-05-30 NOTE — PROGRESS NOTES
Discharge instructions presented to patient with her spouse present. All questions and concerns addressed appropriately. New medications were read and explained to patient, patient verbalized understanding. Patient aware that prescriptions have been electronically sent to their pharmacy. All IV's removed. Patient belongings packed up and with patient. Patient awaiting transport via wheelchair by volunteer services to the main entrance.

## 2024-01-01 ENCOUNTER — HOME CARE VISIT (OUTPATIENT)
Facility: HOME HEALTH | Age: 69
End: 2024-01-01
Payer: MEDICARE

## 2024-01-01 PROCEDURE — G0299 HHS/HOSPICE OF RN EA 15 MIN: HCPCS

## 2024-04-28 ENCOUNTER — HOME CARE VISIT (OUTPATIENT)
Age: 69
End: 2024-04-28
Payer: MEDICARE

## 2024-04-28 ENCOUNTER — HOSPICE ADMISSION (OUTPATIENT)
Age: 69
End: 2024-04-28
Payer: MEDICARE

## 2024-04-28 PROBLEM — I62.9 INTRACRANIAL HEMORRHAGE (HCC): Status: ACTIVE | Noted: 2024-04-28

## 2024-04-28 PROCEDURE — G0155 HHCP-SVS OF CSW,EA 15 MIN: HCPCS

## 2024-04-28 PROCEDURE — 0651 HSPC ROUTINE HOME CARE

## 2024-04-28 PROCEDURE — 0658 HSPC ROOM AND BOARD

## 2024-04-29 PROCEDURE — 0651 HSPC ROUTINE HOME CARE

## 2024-04-29 PROCEDURE — 0658 HSPC ROOM AND BOARD

## 2024-04-30 PROBLEM — F10.90 ALCOHOL USE DISORDER: Status: ACTIVE | Noted: 2024-04-30

## 2024-04-30 PROBLEM — Z51.5 HOSPICE CARE: Status: ACTIVE | Noted: 2024-04-30

## 2024-04-30 PROBLEM — I61.9 HEMORRHAGIC CEREBROVASCULAR ACCIDENT (CVA) (HCC): Status: ACTIVE | Noted: 2024-04-30

## 2024-04-30 PROCEDURE — 0651 HSPC ROUTINE HOME CARE

## 2024-04-30 PROCEDURE — 0658 HSPC ROOM AND BOARD

## 2024-05-01 PROCEDURE — 0651 HSPC ROUTINE HOME CARE

## 2024-05-01 PROCEDURE — 0658 HSPC ROOM AND BOARD

## 2024-05-02 PROCEDURE — 0658 HSPC ROOM AND BOARD

## 2024-05-02 PROCEDURE — 0651 HSPC ROUTINE HOME CARE

## 2024-05-03 ENCOUNTER — HOME CARE VISIT (OUTPATIENT)
Age: 69
End: 2024-05-03
Payer: MEDICARE

## 2024-05-03 VITALS
SYSTOLIC BLOOD PRESSURE: 91 MMHG | OXYGEN SATURATION: 98 % | DIASTOLIC BLOOD PRESSURE: 54 MMHG | RESPIRATION RATE: 18 BRPM | HEART RATE: 87 BPM

## 2024-05-03 PROCEDURE — 0651 HSPC ROUTINE HOME CARE

## 2024-05-03 PROCEDURE — G0299 HHS/HOSPICE OF RN EA 15 MIN: HCPCS

## 2024-05-03 ASSESSMENT — ENCOUNTER SYMPTOMS: TROUBLE SWALLOWING: 1

## 2024-05-03 NOTE — HOSPICE
Huber Maya in Visit - Patient tolerated transportation to [facility] well. She was received in bed watching TV.  Caregiver Alis educated on patient's history and medications.  Alis is declining HHA visits.  Triage ordered SRK for delivery. Patient's VS are stable.  Alert and Oriented x 2, PERRL, left side is weaker. Left wrist is swollen with some bruising.  Patient denies pain. Bruise on left side of face from previous fall. Abdomen BS x 4, LBM 5/3/24, and soft nontender.  No edema. Called Dr. Abraham for medication reconciliation and verification of CTI of Stroke. Medline orders [id number] and [id number]

## 2024-05-04 ENCOUNTER — HOME CARE VISIT (OUTPATIENT)
Facility: HOME HEALTH | Age: 69
End: 2024-05-04
Payer: MEDICARE

## 2024-05-04 VITALS
RESPIRATION RATE: 16 BRPM | DIASTOLIC BLOOD PRESSURE: 88 MMHG | HEART RATE: 99 BPM | SYSTOLIC BLOOD PRESSURE: 126 MMHG | TEMPERATURE: 97.8 F

## 2024-05-04 PROCEDURE — 0651 HSPC ROUTINE HOME CARE

## 2024-05-04 PROCEDURE — G0299 HHS/HOSPICE OF RN EA 15 MIN: HCPCS

## 2024-05-04 ASSESSMENT — ENCOUNTER SYMPTOMS
BOWEL INCONTINENCE: 1
STOOL DESCRIPTION: SOFT

## 2024-05-04 NOTE — HOSPICE
PRN SN visit done for assessment/ due to  Alis called Hospice re: leaking catheter and SRK not delivered yet. Upon visit arrival pt. noted lying supine in hospital bed with HOB elevated. Alis present. Pt. was alert to person/situation and verbal with no s/s of pain or SOB noted. Assessment completed (See system review). Brief changed for small BM, pericare provided. Previous alvarez catheter removed without incident and 16Fr catheter with 10cc bal. inserted first try with positive urine return. Pt. tolerated well. Alvarez was flushed with 30ml sterile saline. With Alis's assist pt. was pulled up in bed then repositioned and propped with pillows for comfort. Triage contacted [facility] and SRK to be delivered today.No med refills or supplies needed at this time. Alis encouraged to call Hospice with any pt. status changes or concerns. Understanding was verbalized.

## 2024-05-05 PROCEDURE — 0651 HSPC ROUTINE HOME CARE

## 2024-05-06 ENCOUNTER — HOME CARE VISIT (OUTPATIENT)
Facility: HOME HEALTH | Age: 69
End: 2024-05-06
Payer: MEDICARE

## 2024-05-06 ENCOUNTER — HOME CARE VISIT (OUTPATIENT)
Age: 69
End: 2024-05-06
Payer: MEDICARE

## 2024-05-06 VITALS
OXYGEN SATURATION: 99 % | HEART RATE: 95 BPM | TEMPERATURE: 97.9 F | SYSTOLIC BLOOD PRESSURE: 128 MMHG | DIASTOLIC BLOOD PRESSURE: 84 MMHG

## 2024-05-06 PROCEDURE — G0299 HHS/HOSPICE OF RN EA 15 MIN: HCPCS

## 2024-05-06 ASSESSMENT — ENCOUNTER SYMPTOMS
STOOL DESCRIPTION: SOFT FORMED
TROUBLE SWALLOWING: 1

## 2024-05-06 NOTE — HOSPICE
Upon arrival Nurse greeted at the door by staff. Ms. Snell stated patient is adjusting well. Patient was in the chair and requested to be put back in bed. Patient has been eating 1-2 meals a day and requesting more snacks. Patient has been coughing while drinking and needs to be reminded to eat slow and smaller bites. Patient was laying in bed watching television. Patient is alert and oriented to self and situation with some forgetfulness. VS are stable and WNL. Skin is dry,warm and intact. Patient stated she was able to see her dog Silvia the other day. Patient stated she is waiting to see other family members to visit her.  Ms. Snell states that the nurse came on Saturday and replaced alvarez and it has been working properly since, no need to discontinue alvarez.  Ms. Snell stated that patient has been having bladder spasms and requesting some medication to decrease spasms. Nurse spoke with Giana Mccann NP an order for Oxybutynin IR 5 mg BID, Tristin Confirmation #: [id number]. Ms. Snell stated that she has not received order the heel protector borona, Medline Order Number : [id number] . Ms. Snell stated patient will need order of thickener [facility] Order #: [id number] Starch thickener. Encouraged staff to call [facility] main number for further questions or concerns.

## 2024-05-07 ENCOUNTER — HOME CARE VISIT (OUTPATIENT)
Age: 69
End: 2024-05-07
Payer: MEDICARE

## 2024-05-07 PROCEDURE — G0155 HHCP-SVS OF CSW,EA 15 MIN: HCPCS

## 2024-05-07 NOTE — HOSPICE
MSW made a visit for support and to complete initial social work assessment. Upon arrival, patient was fall asleep in bed. MSW spoke with group home staff Alis. Alis stated that patient transition into group home went smoothly. Alis stated that since admission, patient has been up watching tv. However, a few days ago patient has begun to to sleep more. Alis reported that pateint recently developed a cough and had a slight fever of 100.8. Alis reports that she continues to monitor patient's temperature. MSW went into patient's room to visit, however, patient continued to rest. MSW annouced her presence and informed patient that she was there to check in on her. Prior to visit, MSW contacted spouse to check in.Spouse shared that he gotten eye surgery yesterday and was unable to attend visit. However, he would reach out if has any social work needs. MSW will continue to follow up with family to assess for needs.

## 2024-05-08 ENCOUNTER — HOME CARE VISIT (OUTPATIENT)
Facility: HOME HEALTH | Age: 69
End: 2024-05-08
Payer: MEDICARE

## 2024-05-08 VITALS
DIASTOLIC BLOOD PRESSURE: 90 MMHG | SYSTOLIC BLOOD PRESSURE: 147 MMHG | OXYGEN SATURATION: 88 % | TEMPERATURE: 99.8 F | HEART RATE: 105 BPM | RESPIRATION RATE: 18 BRPM

## 2024-05-08 PROCEDURE — G0299 HHS/HOSPICE OF RN EA 15 MIN: HCPCS

## 2024-05-08 ASSESSMENT — ENCOUNTER SYMPTOMS
STOOL DESCRIPTION: SOFT FORMED
TROUBLE SWALLOWING: 1

## 2024-05-08 NOTE — HOSPICE
Upon arrival, Nurse met with patient's spouse, Satnam, said patient is struggling today. He is not advising any new medication but wants to make sure patient is comfortable. Nurse met with caregiver Ms. Snell, she had called the main number to have Nurse come to check on patient. Ms. Snell stated she thinks patient aspirated on Monday and still having some affects from it. Patient has a wet cough with increased secretions and a temperature of 101.2 yesterday and this morning.  Ms. Snell gave Tylenol today at 11 am before calling. Ms. Snell has been able to clear secretions from patient's mouth with mouth swabs. Patient has refused to eat any food today and only had some tea this morning. Patient has been in the bed yesterday and today. Patient is responsive to verbal stimuli with answering, yes or no in low and soft tone. Patient O2 stats are fluctuating between 87-92% on room air. Patient appears to be comfortable. Patient denies any pain or SOB. Patient stated she just wants to sleep, Ms Snell stated today was the first time patient has not asked from any wine since arrive at the facility. Nurse completed head to toe assessment, patient has soft rhonchi in both lungs. Patient continues to have intermitted wet cough with increased secretions.  Nurse called NP Giana Mccann to provide update on patient change of condition. No new medications prescribed, no need for suction device. Nurse encourage family and Alis Sandhu to call main number if any further questions or concerns.

## 2024-05-10 ENCOUNTER — HOME CARE VISIT (OUTPATIENT)
Age: 69
End: 2024-05-10
Payer: MEDICARE

## 2024-05-10 NOTE — HOSPICE
Routine spiritual care visit with the patient. The patient was lying in bed and did not respond. The patient's son had just left. Offered end of life conversation, legacy review and prayer.  The patient was lying in bed and did not respond. The  talk to the patient about end of life and offered a prayer for comfort and peace. Patient son was aware that the  visited and was grateful.
soft/nondistended/nontender

## 2024-05-10 NOTE — HOSPICE
Spiritual assessment completed at the [facility] while the patient was there.  Patient not very receptive to  visits.  Family is aware they can reach out if spiritual support is needed.

## 2024-05-11 ENCOUNTER — HOME CARE VISIT (OUTPATIENT)
Age: 69
End: 2024-05-11
Payer: MEDICARE

## 2024-05-12 ENCOUNTER — HOME CARE VISIT (OUTPATIENT)
Facility: HOME HEALTH | Age: 69
End: 2024-05-12
Payer: MEDICARE

## 2024-05-12 VITALS
SYSTOLIC BLOOD PRESSURE: 104 MMHG | HEART RATE: 120 BPM | DIASTOLIC BLOOD PRESSURE: 57 MMHG | RESPIRATION RATE: 20 BRPM | TEMPERATURE: 98.7 F

## 2024-05-12 PROCEDURE — G0299 HHS/HOSPICE OF RN EA 15 MIN: HCPCS

## 2024-05-13 NOTE — HOSPICE
Blaire Valente (1955) passed peacefully at the [facility]. Patient pronounced after 2 minutes of auscultation by two RNs, no heartbeat or pulse. Time of death 12:50pm. Postmortem care completed, alvarez catheter was removed. [facility]. Family was not present as per facility staff, spouse was contacted. Facility staff stated all comfort medications were used and none available to waste.  Dr. Abraham was notified via this email.
